# Patient Record
Sex: FEMALE | Race: WHITE | HISPANIC OR LATINO | Employment: UNEMPLOYED | URBAN - METROPOLITAN AREA
[De-identification: names, ages, dates, MRNs, and addresses within clinical notes are randomized per-mention and may not be internally consistent; named-entity substitution may affect disease eponyms.]

---

## 2018-09-18 ENCOUNTER — HOSPITAL ENCOUNTER (INPATIENT)
Facility: MEDICAL CENTER | Age: 33
LOS: 10 days | DRG: 100 | End: 2018-09-28
Attending: EMERGENCY MEDICINE | Admitting: INTERNAL MEDICINE
Payer: MEDICAID

## 2018-09-18 DIAGNOSIS — F41.9 ANXIETY DISORDER, UNSPECIFIED TYPE: ICD-10-CM

## 2018-09-18 DIAGNOSIS — R41.82 ALTERED MENTAL STATUS, UNSPECIFIED ALTERED MENTAL STATUS TYPE: ICD-10-CM

## 2018-09-18 DIAGNOSIS — R56.9 SEIZURE (HCC): ICD-10-CM

## 2018-09-18 DIAGNOSIS — R56.9 SEIZURES (HCC): ICD-10-CM

## 2018-09-18 PROBLEM — R31.9 URINARY TRACT INFECTION WITH HEMATURIA: Status: ACTIVE | Noted: 2018-09-18

## 2018-09-18 PROBLEM — G40.909 SEIZURE DISORDER (HCC): Status: ACTIVE | Noted: 2018-09-18

## 2018-09-18 PROBLEM — N39.0 URINARY TRACT INFECTION WITH HEMATURIA: Status: ACTIVE | Noted: 2018-09-18

## 2018-09-18 PROCEDURE — A9270 NON-COVERED ITEM OR SERVICE: HCPCS | Performed by: INTERNAL MEDICINE

## 2018-09-18 PROCEDURE — 95951 EEG: CPT | Mod: 52

## 2018-09-18 PROCEDURE — 700105 HCHG RX REV CODE 258: Performed by: INTERNAL MEDICINE

## 2018-09-18 PROCEDURE — 700105 HCHG RX REV CODE 258: Performed by: PSYCHIATRY & NEUROLOGY

## 2018-09-18 PROCEDURE — A9270 NON-COVERED ITEM OR SERVICE: HCPCS | Performed by: EMERGENCY MEDICINE

## 2018-09-18 PROCEDURE — 770006 HCHG ROOM/CARE - MED/SURG/GYN SEMI*

## 2018-09-18 PROCEDURE — 700102 HCHG RX REV CODE 250 W/ 637 OVERRIDE(OP): Performed by: EMERGENCY MEDICINE

## 2018-09-18 PROCEDURE — 700102 HCHG RX REV CODE 250 W/ 637 OVERRIDE(OP): Performed by: INTERNAL MEDICINE

## 2018-09-18 PROCEDURE — 700111 HCHG RX REV CODE 636 W/ 250 OVERRIDE (IP): Performed by: INTERNAL MEDICINE

## 2018-09-18 PROCEDURE — 700111 HCHG RX REV CODE 636 W/ 250 OVERRIDE (IP): Performed by: PSYCHIATRY & NEUROLOGY

## 2018-09-18 PROCEDURE — 99223 1ST HOSP IP/OBS HIGH 75: CPT | Performed by: INTERNAL MEDICINE

## 2018-09-18 PROCEDURE — 99285 EMERGENCY DEPT VISIT HI MDM: CPT

## 2018-09-18 RX ORDER — CARBAMAZEPINE 200 MG/1
200 TABLET ORAL 2 TIMES DAILY
Status: DISCONTINUED | OUTPATIENT
Start: 2018-09-18 | End: 2018-09-20

## 2018-09-18 RX ORDER — PROMETHAZINE HYDROCHLORIDE 25 MG/1
12.5-25 SUPPOSITORY RECTAL EVERY 4 HOURS PRN
Status: DISCONTINUED | OUTPATIENT
Start: 2018-09-18 | End: 2018-09-20

## 2018-09-18 RX ORDER — LORAZEPAM 2 MG/ML
4 INJECTION INTRAMUSCULAR
Status: DISCONTINUED | OUTPATIENT
Start: 2018-09-18 | End: 2018-09-28 | Stop reason: HOSPADM

## 2018-09-18 RX ORDER — CARBAMAZEPINE 200 MG/1
400 TABLET ORAL ONCE
Status: COMPLETED | OUTPATIENT
Start: 2018-09-18 | End: 2018-09-18

## 2018-09-18 RX ORDER — POLYETHYLENE GLYCOL 3350 17 G/17G
1 POWDER, FOR SOLUTION ORAL
Status: DISCONTINUED | OUTPATIENT
Start: 2018-09-18 | End: 2018-09-28 | Stop reason: HOSPADM

## 2018-09-18 RX ORDER — PROMETHAZINE HYDROCHLORIDE 25 MG/1
12.5-25 TABLET ORAL EVERY 4 HOURS PRN
Status: DISCONTINUED | OUTPATIENT
Start: 2018-09-18 | End: 2018-09-20

## 2018-09-18 RX ORDER — ACETAMINOPHEN 325 MG/1
650 TABLET ORAL EVERY 6 HOURS PRN
Status: DISCONTINUED | OUTPATIENT
Start: 2018-09-18 | End: 2018-09-28 | Stop reason: HOSPADM

## 2018-09-18 RX ORDER — LORAZEPAM 2 MG/ML
.5-1 INJECTION INTRAMUSCULAR EVERY 6 HOURS PRN
Status: DISCONTINUED | OUTPATIENT
Start: 2018-09-18 | End: 2018-09-28 | Stop reason: HOSPADM

## 2018-09-18 RX ORDER — SODIUM CHLORIDE 9 MG/ML
INJECTION, SOLUTION INTRAVENOUS CONTINUOUS
Status: DISCONTINUED | OUTPATIENT
Start: 2018-09-18 | End: 2018-09-19

## 2018-09-18 RX ORDER — LORAZEPAM 2 MG/ML
1 INJECTION INTRAMUSCULAR ONCE
Status: COMPLETED | OUTPATIENT
Start: 2018-09-18 | End: 2018-09-18

## 2018-09-18 RX ORDER — IBUPROFEN 200 MG
200 TABLET ORAL EVERY 6 HOURS PRN
Status: DISCONTINUED | OUTPATIENT
Start: 2018-09-18 | End: 2018-09-28 | Stop reason: HOSPADM

## 2018-09-18 RX ORDER — CARBAMAZEPINE 200 MG/1
200 TABLET ORAL 2 TIMES DAILY
Status: DISCONTINUED | OUTPATIENT
Start: 2018-09-18 | End: 2018-09-18

## 2018-09-18 RX ORDER — ONDANSETRON 4 MG/1
4 TABLET, ORALLY DISINTEGRATING ORAL EVERY 4 HOURS PRN
Status: DISCONTINUED | OUTPATIENT
Start: 2018-09-18 | End: 2018-09-28 | Stop reason: HOSPADM

## 2018-09-18 RX ORDER — AMOXICILLIN 250 MG
2 CAPSULE ORAL 2 TIMES DAILY
Status: DISCONTINUED | OUTPATIENT
Start: 2018-09-18 | End: 2018-09-28 | Stop reason: HOSPADM

## 2018-09-18 RX ORDER — ONDANSETRON 2 MG/ML
4 INJECTION INTRAMUSCULAR; INTRAVENOUS EVERY 4 HOURS PRN
Status: DISCONTINUED | OUTPATIENT
Start: 2018-09-18 | End: 2018-09-28 | Stop reason: HOSPADM

## 2018-09-18 RX ORDER — BISACODYL 10 MG
10 SUPPOSITORY, RECTAL RECTAL
Status: DISCONTINUED | OUTPATIENT
Start: 2018-09-18 | End: 2018-09-28 | Stop reason: HOSPADM

## 2018-09-18 RX ADMIN — SODIUM CHLORIDE: 9 INJECTION, SOLUTION INTRAVENOUS at 11:36

## 2018-09-18 RX ADMIN — CARBAMAZEPINE 400 MG: 200 TABLET ORAL at 11:36

## 2018-09-18 RX ADMIN — ENOXAPARIN SODIUM 40 MG: 40 INJECTION, SOLUTION INTRAVENOUS; SUBCUTANEOUS at 11:36

## 2018-09-18 RX ADMIN — CARBAMAZEPINE 200 MG: 200 TABLET ORAL at 18:24

## 2018-09-18 RX ADMIN — LORAZEPAM 1 MG: 2 INJECTION INTRAMUSCULAR; INTRAVENOUS at 20:06

## 2018-09-18 RX ADMIN — SODIUM CHLORIDE 3000 MG: 9 INJECTION, SOLUTION INTRAVENOUS at 20:26

## 2018-09-18 RX ADMIN — SODIUM CHLORIDE: 9 INJECTION, SOLUTION INTRAVENOUS at 20:26

## 2018-09-18 RX ADMIN — IBUPROFEN 200 MG: 200 TABLET, FILM COATED ORAL at 15:56

## 2018-09-18 ASSESSMENT — COGNITIVE AND FUNCTIONAL STATUS - GENERAL
PERSONAL GROOMING: A LITTLE
DRESSING REGULAR UPPER BODY CLOTHING: A LITTLE
EATING MEALS: A LITTLE
CLIMB 3 TO 5 STEPS WITH RAILING: A LITTLE
MOVING TO AND FROM BED TO CHAIR: A LITTLE
SUGGESTED CMS G CODE MODIFIER DAILY ACTIVITY: CK
HELP NEEDED FOR BATHING: A LITTLE
DAILY ACTIVITIY SCORE: 18
TOILETING: A LITTLE
DRESSING REGULAR LOWER BODY CLOTHING: A LITTLE
WALKING IN HOSPITAL ROOM: A LITTLE
MOVING FROM LYING ON BACK TO SITTING ON SIDE OF FLAT BED: A LITTLE
SUGGESTED CMS G CODE MODIFIER MOBILITY: CK
MOBILITY SCORE: 19
STANDING UP FROM CHAIR USING ARMS: A LITTLE

## 2018-09-18 ASSESSMENT — PATIENT HEALTH QUESTIONNAIRE - PHQ9
2. FEELING DOWN, DEPRESSED, IRRITABLE, OR HOPELESS: NOT AT ALL
SUM OF ALL RESPONSES TO PHQ9 QUESTIONS 1 AND 2: 0
1. LITTLE INTEREST OR PLEASURE IN DOING THINGS: NOT AT ALL

## 2018-09-18 ASSESSMENT — LIFESTYLE VARIABLES
EVER_SMOKED: NEVER
ALCOHOL_USE: NO

## 2018-09-18 ASSESSMENT — PAIN SCALES - GENERAL
PAINLEVEL_OUTOF10: 0
PAINLEVEL_OUTOF10: 4
PAINLEVEL_OUTOF10: 4
PAINLEVEL_OUTOF10: 0

## 2018-09-18 NOTE — H&P
Hospital Medicine History and Physical      Date of Service  2018    Chief Complaint  Chief Complaint   Patient presents with   • ALOC   • Seizure       History of Presenting Illness  Andrew Horton is a 33 y.o. female PMH of seizure disorder, who presents with multiple seiure episode. She is only Indonesian speaking. She is somewhat poor historian. She was transferred from outside facility for multiple seizure episodes. She said she takes her medication regularly, but didn't know her neurology. Per ERP from outside facility she had about 10- seizure episodes on Friday and fell and hit her right side of the head. CT head was negative. She also complaint about bilateral LE pain. In the ER, neurology was consulted.    Primary Care Physician  SHERRIE Thompson      Code Status  Full code    Review of Systems  Review of Systems   Unable to perform ROS: Mental acuity     Please see HPI, all other systems were reviewed and are negative (AMA/CMS criteria)     Past Medical History  Past Medical History:   Diagnosis Date   • Anemia 2013   • Epilepsy associated with specific stimuli (HCC)    • Head ache    • Seizure (HCC)        Surgical History  No past surgical history on file.    Medications  No current facility-administered medications on file prior to encounter.      Current Outpatient Prescriptions on File Prior to Encounter   Medication Sig Dispense Refill   • carBAMazepine (TEGRETOL) 200 MG Tab Take 1 Tab by mouth 2 Times a Day for 90 days. 180 Tab 0     Family History  Family History   Problem Relation Age of Onset   • Hypertension Father          Social History  Social History   Substance Use Topics   • Smoking status: Never Smoker   • Smokeless tobacco: Never Used   • Alcohol use No       Allergies  No Known Allergies     Physical Exam  Laboratory   Hemodynamics  No data recorded.      Pulse  Av  Min: 72  Max: 75 Heart Rate (Monitored): 84  NIBP: 126/79      Respiratory      Respiration: 16, Pulse  Oximetry: (!) 87 %             Physical Exam   Constitutional: No distress.   HENT:   Head: Normocephalic.   Mouth/Throat: Oropharynx is clear and moist.   Bruise over right side of the face   Eyes: Pupils are equal, round, and reactive to light. Conjunctivae and EOM are normal.   Neck: Normal range of motion. Neck supple. No tracheal deviation present. No thyromegaly present.   Cardiovascular: Normal rate and regular rhythm.    No murmur heard.  Pulmonary/Chest: Effort normal and breath sounds normal. No respiratory distress. She has no wheezes.   Abdominal: Soft. Bowel sounds are normal. She exhibits no distension. There is no tenderness.   Musculoskeletal: She exhibits no edema or tenderness.   Neurological: She is alert. No cranial nerve deficit.   Skin: Skin is warm and dry. She is not diaphoretic. No erythema.               No results for input(s): ALTSGPT, ASTSGOT, ALKPHOSPHAT, TBILIRUBIN, DBILIRUBIN, GAMMAGT, AMYLASE, LIPASE, ALB, PREALBUMIN, GLUCOSE in the last 72 hours.              Lab Results   Component Value Date    TROPONINI <0.04 05/11/2013       Imaging  No orders to display       Labs:   WBC 4.9, hemoglobin 14.4, platelet 147   Glucose 1:15, BUN 11, creatinine 0.61, sodium 141, potassium 3.5, chloride 106, CO2 26   Urine drug screen negative   Urinalysis   1+ leukocyte esterase , 15-20 WBC    Assessment/Plan     I anticipate this patient will require at least two midnights for appropriate medical management, necessitating inpatient admission.    Urinary tract infection with hematuria- (present on admission)   Assessment & Plan    + UA from outside facility  Due to confusion, will empirically start her on IV ceftriaxone        Seizure disorder (HCC)- (present on admission)   Assessment & Plan    Plan as above        Acute encephalopathy- (present on admission)   Assessment & Plan    She is on trileptal  On carbamazepine  Check carbamazepine level  On IV ativan for breakthrough seizure  Check urine  drugs  Aspiration, fall, seizure precaution  Neurology consulted            Prophylaxis:  lovenox

## 2018-09-18 NOTE — ASSESSMENT & PLAN NOTE
+ UA from outside facility and initially started on IV ceftriaxone.  Discussed with antimicrobial stewardship, no need for further antibiotics.  Subsequently ceftriaxone discontinued.     -Continues with no complaints of dysuria, frequency or urgency

## 2018-09-18 NOTE — ED TRIAGE NOTES
"Pt presents to ED via EMS from [facility] for a neuro consult. Pt is Mongolian speaking only. Pt able to walk from EMS stretcher to hospital stretcher. Pt was brought to their ED after having multiple seizures. pts  reported that pt had approximately \"10 seizure on Friday\" and has been having seizures weekly. Pt has hx of seizures and is on Trileptal. Reports of seizures lasting between 4-5 mins in length. EMS reports that pt hit head during one of those episodes. Pt has abrasion and contusions around right eye. EMS reports that pt also has a UTI. Pt was given Rocephin, ibuprofen and robaxin at previous facility. Pt is able to identify self and year. Pt unaware of situation. Pt cannot tell this RN the month. Pt is able to follow commands. During assessment pt states sensation is decreased in right arm. Pt denies any pain. Pt placed on cardiac monitor and continuous spO2 call light in reach. Will continue to monitor.   "

## 2018-09-18 NOTE — ED NOTES
Pt found confused, walking around unit. Redirected pt back to bed. Advised that it is unsafe to walk around with assistance.

## 2018-09-18 NOTE — ED PROVIDER NOTES
ED Provider Note    CHIEF COMPLAINT  Chief Complaint   Patient presents with   • ALOC   • Seizure       HPI  Jennifer Alexander is a 33 y.o. female who presents as a transfer from [place] for evaluation of seizures.  The patient is Albanian-speaking only and history is obtained using the [place] language line  and also primarily from the notes that were sent from [place].  Patient has a history of a seizure disorder.  It appears that she takes Trileptal.  She states she has been taking it regularly.  Reportedly she had 10 seizures on Friday.  The patient does not really recall when her last seizure was.  She states she feels absolutely fine currently.  She had a CT scan in [place] that was normal.  Her laboratory workup was unremarkable.  Urine showed leukocyte esterase and some white cells and she was thought to possibly have a UTI however in reviewing that it looks like there were a lot of epithelial cells and this likely was just a contaminated specimen.  The patient was transferred for possible MRI, EEG, and neurology consult.    REVIEW OF SYSTEMS  See HPI for further details. All other systems negative.    PAST MEDICAL HISTORY  Past Medical History:   Diagnosis Date   • Anemia 1/30/2013   • Epilepsy associated with specific stimuli (HCC)    • Head ache    • Seizure (HCC)        FAMILY HISTORY  Family History   Problem Relation Age of Onset   • Hypertension Father        SOCIAL HISTORY  Social History     Social History   • Marital status: Single     Spouse name: N/A   • Number of children: N/A   • Years of education: N/A     Social History Main Topics   • Smoking status: Never Smoker   • Smokeless tobacco: Never Used   • Alcohol use No   • Drug use: No   • Sexual activity: Yes     Partners: Male     Other Topics Concern   • Not on file     Social History Narrative    ** Merged History Encounter **            SURGICAL HISTORY  History reviewed. No pertinent surgical history.    CURRENT  "MEDICATIONS  Home Medications     Reviewed by Rosibel Rosenberg R.N. (Registered Nurse) on 09/18/18 at 0701  Med List Status: <None>   Medication Last Dose Status   carBAMazepine (TEGRETOL) 200 MG Tab  Active                ALLERGIES  No Known Allergies    PHYSICAL EXAM  VITAL SIGNS: Pulse 75   Resp 14   Ht 1.626 m (5' 4\")   Wt 52.2 kg (115 lb)   LMP  (LMP Unknown)   SpO2 98%   BMI 19.74 kg/m²   Constitutional: Well developed, Well nourished, No acute distress, Non-toxic appearance.   HENT: Normocephalic, subacute abrasions to the right periorbital region.  No malocclusion.  Eyes: PERRL, EOMI, Conjunctiva normal, No discharge.   Neck: Normal range of motion, Supple, No meningismus.  Cardiovascular: Normal heart rate, Normal rhythm, No murmurs, No rubs, No gallops.   Thorax & Lungs: Clear to auscultation without wheezes, rales, or rhonchi.   Abdomen: Soft and nontender.   Skin: Warm, Dry.  Musculoskeletal: Good range of motion in all major joints.    Neurologic: Awake and alert.  Moves all extremities spontaneously and symmetrically with no focal deficits.      COURSE & MEDICAL DECISION MAKING  Pertinent Labs & Imaging studies reviewed. (See chart for details)  Is a 33-year-old transferred from [facility] for evaluation of seizures.  Patient appears neurologically intact.  She does appear to be a bit confused still not knowing exactly when her last seizure was however she has no physical complaints at this time.  I reviewed the records from [facility].  At this point I will discuss the case with the hospitalist for admission and further evaluation.    FINAL IMPRESSION  1.  Multiple seizures  2.   3.         Electronically signed by: Laz Alegre, 9/18/2018 7:31 AM    "

## 2018-09-18 NOTE — ASSESSMENT & PLAN NOTE
Continues to be more lucid and able to hold a conversation and answer questions appropriately, however continues with depressed mood at times.   Carbamazepine level unremarkable at 8  Continue on Ativan PRN and Geodon  Unable to report to DMV as patient has no current 's license and admitted to not being a [place] citizen.

## 2018-09-18 NOTE — CONSULTS
"CC:  Confusion following seizure    Date of Admission: 9/18/2018    Today's Date: 09/18/18    Consulting Physician: No att. providers found      HPI:    Jennifer Alexander is a 33 y.o. female with a PMH of seizures. HPI was relayed by family members at bedside along with patient. Patient had seizures on Thursday and Friday, following which she has been confused and a little slow cognitively. Family relates that when patient had seizures,she fell to the ground injuring her face. The family states that she has suffered with these since child mcknight, she used to be followed by a Neurologist in [place] about 10 years ago. She has tried several different medications, due to side effects, and is currently in Carbamazepine 200mg BID. However, patient has not been taking her medication as prescribed, she skips doses due to side effects.       ROS:     Patient mentioned feeling \"fast\" all along her spinal cord.     Constitutional: No fevers or chills.  Eyes: No blurry vision or eye pain.  ENT: No dysphagia or hearing loss.  Respiratory: No cough or shortness of breath.  Cardiovascular: No chest pain or palpitations.  GI: No nausea, vomiting, or diarrhea.  : No urinary incontinence or dysuria.  Musculoskeletal: No joint swelling or arthralgias.  Skin: No skin rashes.  Neuro: See HPI.  Endocrine: No heat or cold intolerance. No polydipsia or polyuria.  Psych: No depression or anxiety.  Heme/Lymph: No easy bruising or swollen lymph nodes.  All other systems were reviewed and were negative.       Past Medical History:   Past Medical History:   Diagnosis Date   • Anemia 1/30/2013   • Epilepsy associated with specific stimuli (HCC)    • Head ache    • Seizure (HCC)        Past Surgical History: History reviewed. No pertinent surgical history.    Social History:   Social History     Social History   • Marital status: Single     Spouse name: N/A   • Number of children: N/A   • Years of education: N/A     Occupational History   • " Not on file.     Social History Main Topics   • Smoking status: Never Smoker   • Smokeless tobacco: Never Used   • Alcohol use No   • Drug use: No   • Sexual activity: Yes     Partners: Male     Other Topics Concern   • Not on file     Social History Narrative    ** Merged History Encounter **            Family History:   Family History   Problem Relation Age of Onset   • Hypertension Father        Allergies: No Known Allergies      Current Facility-Administered Medications:   •  senna-docusate (PERICOLACE or SENOKOT S) 8.6-50 MG per tablet 2 Tab, 2 Tab, Oral, BID **AND** polyethylene glycol/lytes (MIRALAX) PACKET 1 Packet, 1 Packet, Oral, QDAY PRN **AND** magnesium hydroxide (MILK OF MAGNESIA) suspension 30 mL, 30 mL, Oral, QDAY PRN **AND** bisacodyl (DULCOLAX) suppository 10 mg, 10 mg, Rectal, QDAY PRN, Jimmy Birch M.D.  •  NS infusion, , Intravenous, Continuous, Jimmy Birch M.D., Last Rate: 100 mL/hr at 09/18/18 1136  •  enoxaparin (LOVENOX) inj 40 mg, 40 mg, Subcutaneous, DAILY, Jimmy Birch M.D., 40 mg at 09/18/18 1136  •  acetaminophen (TYLENOL) tablet 650 mg, 650 mg, Oral, Q6HRS PRN, Jimmy Birch M.D.  •  ondansetron (ZOFRAN) syringe/vial injection 4 mg, 4 mg, Intravenous, Q4HRS PRN, Jimmy Birch M.D.  •  ondansetron (ZOFRAN ODT) dispertab 4 mg, 4 mg, Oral, Q4HRS PRN, Jimmy Birch M.D.  •  promethazine (PHENERGAN) tablet 12.5-25 mg, 12.5-25 mg, Oral, Q4HRS PRN, Jimmy Birch M.D.  •  promethazine (PHENERGAN) suppository 12.5-25 mg, 12.5-25 mg, Rectal, Q4HRS PRN, Jimmy Birch M.D.  •  prochlorperazine (COMPAZINE) injection 5-10 mg, 5-10 mg, Intravenous, Q4HRS PRN, Jimmy Birch M.D.  •  LORazepam (ATIVAN) injection 4 mg, 4 mg, Intravenous, Q10 MIN PRN, Jimmy Birch M.D.  •  [START ON 9/19/2018] cefTRIAXone (ROCEPHIN) 2 g in  mL IVPB, 2 g, Intravenous, Q24HRS, Jimmy Birch M.D.  •  carBAMazepine (TEGRETOL) tablet 200 mg, 200 mg, Oral, BID, Jimmy Birch M.D.      PHYSICAL EXAM    Vitals:    09/18/18 0800 09/18/18 0815  09/18/18 0930 09/18/18 1055   BP:    136/75   Pulse: 72 72 74 76   Resp: 16 16 20 16   Temp:    36.8 °C (98.2 °F)   SpO2: 98% (!) 87% 100% 98%   Weight:       Height:         PE limited due to patient being set up for EEG    Head/Neck: NCAT. no meningismus neg kernig neg brudzinski. No obvious mass or heard bruit. No tender arteries or lost pulses. No rash of head or neck.    Skin: Warm, dry, intact. No rashes observed head/neck or body    Eyes/Funduscopic: no papilledema/pallor.    Mental Status: Awake, alert, oriented x 2. Name/repeat/fluent/command follow  intact. Cognitive delay and memory recovery slow.     Cranial Nerves: CN II-XII intact. PERRLA.   No afferent pupillary defect. EOMI. No nystagmus.       Motor:  intact, no abn mvmts.  bulk & tone wnl.      Sensory: symmetric to sharp     Coordination: dysmetria absent    DTR's: intact/sym. no clonus. Toes mute.    Gait/Station: n/a fall concern      Labs:                          No results for input(s): SODIUM, POTASSIUM, CHLORIDE, CO2, GLUCOSE, BUN, CPKTOTAL in the last 72 hours.  No results for input(s): SODIUM, POTASSIUM, CHLORIDE, CO2, BUN, CREATININE, MAGNESIUM, PHOSPHORUS, CALCIUM in the last 72 hours.      No results found for this or any previous visit.           Imaging: neuroimaging reviewed and directly visualized by me  No orders to display       Assessment/Plan:    Epilepsy, due to medication non compliance  Patient on Carbamazepine 200mg BID, skipping doses due to side effects.   Plan  -EEG  -Ativan for seizures  -Restart carbamazepine  -Consider starting patient on Keprra      Hang Burroughs M.D.  [facility] Resident PGY-2

## 2018-09-18 NOTE — ASSESSMENT & PLAN NOTE
No seizure activity noted over the last 48 hours.    Continue Keppra per neurology and Carbamazepine being tapered off  Keppra at current dosing is $116/month at [facility] for cash price

## 2018-09-19 PROBLEM — R94.31 ABNORMAL EKG: Status: ACTIVE | Noted: 2018-09-19

## 2018-09-19 PROBLEM — R45.851 SUICIDAL IDEATIONS: Status: ACTIVE | Noted: 2018-09-19

## 2018-09-19 LAB
ALBUMIN SERPL BCP-MCNC: 3.6 G/DL (ref 3.2–4.9)
ALBUMIN/GLOB SERPL: 1.6 G/DL
ALP SERPL-CCNC: 61 U/L (ref 30–99)
ALT SERPL-CCNC: 10 U/L (ref 2–50)
ANION GAP SERPL CALC-SCNC: 8 MMOL/L (ref 0–11.9)
AST SERPL-CCNC: 12 U/L (ref 12–45)
BILIRUB SERPL-MCNC: 0.4 MG/DL (ref 0.1–1.5)
BUN SERPL-MCNC: 5 MG/DL (ref 8–22)
CALCIUM SERPL-MCNC: 7.8 MG/DL (ref 8.5–10.5)
CARBAMAZEPINE SERPL-MCNC: 8 UG/ML (ref 4–12)
CHLORIDE SERPL-SCNC: 109 MMOL/L (ref 96–112)
CO2 SERPL-SCNC: 24 MMOL/L (ref 20–33)
CREAT SERPL-MCNC: 0.57 MG/DL (ref 0.5–1.4)
ERYTHROCYTE [DISTWIDTH] IN BLOOD BY AUTOMATED COUNT: 41.1 FL (ref 35.9–50)
GLOBULIN SER CALC-MCNC: 2.2 G/DL (ref 1.9–3.5)
GLUCOSE SERPL-MCNC: 94 MG/DL (ref 65–99)
HCT VFR BLD AUTO: 40.8 % (ref 37–47)
HGB BLD-MCNC: 13.6 G/DL (ref 12–16)
MCH RBC QN AUTO: 29.8 PG (ref 27–33)
MCHC RBC AUTO-ENTMCNC: 33.3 G/DL (ref 33.6–35)
MCV RBC AUTO: 89.3 FL (ref 81.4–97.8)
PLATELET # BLD AUTO: 120 K/UL (ref 164–446)
PMV BLD AUTO: 9.7 FL (ref 9–12.9)
POTASSIUM SERPL-SCNC: 3.8 MMOL/L (ref 3.6–5.5)
PROT SERPL-MCNC: 5.8 G/DL (ref 6–8.2)
RBC # BLD AUTO: 4.57 M/UL (ref 4.2–5.4)
SODIUM SERPL-SCNC: 141 MMOL/L (ref 135–145)
TROPONIN I SERPL-MCNC: <0.01 NG/ML (ref 0–0.04)
WBC # BLD AUTO: 6.7 K/UL (ref 4.8–10.8)

## 2018-09-19 PROCEDURE — 80053 COMPREHEN METABOLIC PANEL: CPT

## 2018-09-19 PROCEDURE — 700111 HCHG RX REV CODE 636 W/ 250 OVERRIDE (IP): Performed by: INTERNAL MEDICINE

## 2018-09-19 PROCEDURE — 700105 HCHG RX REV CODE 258: Performed by: PSYCHIATRY & NEUROLOGY

## 2018-09-19 PROCEDURE — 700105 HCHG RX REV CODE 258: Performed by: INTERNAL MEDICINE

## 2018-09-19 PROCEDURE — 84484 ASSAY OF TROPONIN QUANT: CPT

## 2018-09-19 PROCEDURE — 85027 COMPLETE CBC AUTOMATED: CPT

## 2018-09-19 PROCEDURE — 99233 SBSQ HOSP IP/OBS HIGH 50: CPT | Performed by: HOSPITALIST

## 2018-09-19 PROCEDURE — 93010 ELECTROCARDIOGRAM REPORT: CPT | Performed by: INTERNAL MEDICINE

## 2018-09-19 PROCEDURE — 770006 HCHG ROOM/CARE - MED/SURG/GYN SEMI*

## 2018-09-19 PROCEDURE — A9270 NON-COVERED ITEM OR SERVICE: HCPCS | Performed by: INTERNAL MEDICINE

## 2018-09-19 PROCEDURE — 80156 ASSAY CARBAMAZEPINE TOTAL: CPT

## 2018-09-19 PROCEDURE — 700102 HCHG RX REV CODE 250 W/ 637 OVERRIDE(OP): Performed by: INTERNAL MEDICINE

## 2018-09-19 PROCEDURE — 700111 HCHG RX REV CODE 636 W/ 250 OVERRIDE (IP): Performed by: PSYCHIATRY & NEUROLOGY

## 2018-09-19 PROCEDURE — 36415 COLL VENOUS BLD VENIPUNCTURE: CPT

## 2018-09-19 PROCEDURE — 93005 ELECTROCARDIOGRAM TRACING: CPT | Performed by: NURSE PRACTITIONER

## 2018-09-19 RX ADMIN — STANDARDIZED SENNA CONCENTRATE AND DOCUSATE SODIUM 2 TABLET: 8.6; 5 TABLET ORAL at 05:26

## 2018-09-19 RX ADMIN — ENOXAPARIN SODIUM 40 MG: 40 INJECTION, SOLUTION INTRAVENOUS; SUBCUTANEOUS at 05:26

## 2018-09-19 RX ADMIN — CARBAMAZEPINE 200 MG: 200 TABLET ORAL at 05:26

## 2018-09-19 RX ADMIN — SODIUM CHLORIDE 750 MG: 9 INJECTION, SOLUTION INTRAVENOUS at 05:05

## 2018-09-19 RX ADMIN — SODIUM CHLORIDE: 9 INJECTION, SOLUTION INTRAVENOUS at 15:27

## 2018-09-19 RX ADMIN — CARBAMAZEPINE 200 MG: 200 TABLET ORAL at 18:44

## 2018-09-19 RX ADMIN — SODIUM CHLORIDE 750 MG: 9 INJECTION, SOLUTION INTRAVENOUS at 18:43

## 2018-09-19 RX ADMIN — SODIUM CHLORIDE: 9 INJECTION, SOLUTION INTRAVENOUS at 05:21

## 2018-09-19 RX ADMIN — STANDARDIZED SENNA CONCENTRATE AND DOCUSATE SODIUM 2 TABLET: 8.6; 5 TABLET ORAL at 18:44

## 2018-09-19 RX ADMIN — CEFTRIAXONE SODIUM 2 G: 2 INJECTION, POWDER, FOR SOLUTION INTRAMUSCULAR; INTRAVENOUS at 05:21

## 2018-09-19 ASSESSMENT — PAIN SCALES - GENERAL
PAINLEVEL_OUTOF10: 0

## 2018-09-19 NOTE — EEG PROGRESS NOTE
EEG 09/18/18 6:33 PM    ROUTINE VIDEO ELECTROENCEPHALOGRAM REPORT      NAME: Sana-Martinez    REFERRING Dr: DR. ALBARADO    DURATION: 26 minutes    INDICATION: Seizure      TECHNIQUE: 30 channel routine electroencephalogram (EEG) was performed in accordance with the international 10-20 system. The study was reviewed in bipolar and referential montages. The recording examined the patient during wakeful and drowsy state(s).     DESCRIPTION OF THE RECORD:      Background rhythm during awake stage shows well-organized, well-developed, average voltage 10 to 11 hertz alpha activity in the posterior regions.  It blocks with eye opening and it is bilaterally synchronous and symmetrical.  No spike-and-wave discharges or any lateralizing abnormalities are seen.  Photic stimulation did not produce any abnormalities. No abnormalities were found during the procedure. Stage I sleep was achieved.      ACTIVATION PROCEDURES:      Photic Stimulation were done    ICTAL AND/OR INTERICTAL FINDINGS:    No focal or generalized epileptiform activity noted. No regional slowing was seen during this routine study.  No clinical events or seizures were reported or recorded during the study.      EKG: sampling of the EKG recording demonstrated sinus rhythm.        INTERPRETATION:      ________________________________________________________________________    This is normal routine video EEG recording in the awake and drowsy/sleep state(s).    This scalp video EEG remains not remarkable    Of note, unremarkable EEG does not completely exclude the diagnosis  of seizures since seizure is an episodic phenomena and frontal lobe seizures could have normal scalp EEG. Clinical correlation may help     If clinical suspicion of seizure remains high.  Prolonged outpatient EEG   monitoring may be of help.    EEG 09/18/18 6:53 PM  ________________________________________________________________________

## 2018-09-19 NOTE — PROGRESS NOTES
Dr. Badillo and Unique Ronquillo, NP at bedside assessing pt.  in use. Pt expressed suicidal ideations, SAD score of 3. Legal hold paperwork given to providers. Providers to place appropriate orders. 1:1 precautions in place.

## 2018-09-19 NOTE — PROGRESS NOTES
Patient admitted to neuroscience floor at this time from ER. A&ox1, to name. Complains of menstrual pain and MD aware. 3 abrasions to right eye. Follows simple commands, Swedish speaking only. Safety precautions maintained.

## 2018-09-19 NOTE — PROGRESS NOTES
"Assumed pt care at 1900. Pt alert drowsy, flat affect, confused. Bed alarm on. Fall precautions in place. No distress. Iv placed, pt removed when trying to get bed to use restroom. Second iv placed. Normal saline running at 100ml/hr. continuous pulse ox. Intact.  VSS Blood pressure 111/67, pulse 61, temperature 36.8 °C (98.3 °F), resp. rate 16, height 1.626 m (5' 4\"), weight 48.3 kg (106 lb 7.7 oz), last menstrual period 09/18/2018, SpO2 97 %, not currently breastfeeding.    Will monitor and continue plan of care.   "

## 2018-09-19 NOTE — PROGRESS NOTES
After ativan Pt slept through out night, drowsy when awake. Answers questions,. VSS, will monitor and continue plan of care.

## 2018-09-19 NOTE — PROGRESS NOTES
2 RN skin check with Ney MCKEON. Patient has three abrasions/scabs near right eye. Otherwise skin intact.

## 2018-09-19 NOTE — DIETARY
Nutrition Services: Day 1 of admit.  Jennifer Alexander is a 33 y.o. female with admitting DX of Seizure   Consult received for Low BMI, Poor PO & Wt Loss on Nutrition Admit Screen     Assessment:  Ht: 162.6 cm, Wt 9/18: 48.3 kg via bed scale, BMI: 18.27  Diet/Intake: Regular - Per chart pt PO % 1 meal documented.      Evaluation:   1. Attempted to visit pt, pt was busy with other discipline.   2. Per chart review pt's wt on 7/11 - 46.7 kg, wt has increased.   3. Meds: tegretol, rocephin, lovenox, keppra, pericolace  4. NS infusion @ 100 ml/hr  5. Last BM: 9/17    Recommendations/Plan:  1. Encourage intake of meals  2. Document intake of all meals as % taken in ADL's to provide interdisciplinary communication across all shifts.   3. Monitor weight.  4. Nutrition rep will continue to see patient for ongoing meal and snack preferences.  5. Obtain supplement order per RD as needed.    RD following

## 2018-09-19 NOTE — PROCEDURES
DATE OF SERVICE:  09/18/2018    This is an inpatient EEG that was done on 09/18/2018.    ROUTINE VIDEO ELECTROENCEPHALOGRAM REPORT        NAME: Jennifer Alexander     REFERRING Dr: DR. ALBARADO     DURATION: 26 minutes     INDICATION: Seizure        TECHNIQUE: 30 channel routine electroencephalogram (EEG) was performed in accordance with the international 10-20 system. The study was reviewed in bipolar and referential montages. The recording examined the patient during wakeful and drowsy state(s).      DESCRIPTION OF THE RECORD:        Background rhythm during awake stage shows well-organized, well-developed, average voltage 10 to 11 hertz alpha activity in the posterior regions.  It blocks with eye opening and it is bilaterally synchronous and symmetrical.  No spike-and-wave discharges or any lateralizing abnormalities are seen.  Photic stimulation did not produce any abnormalities. No abnormalities were found during the procedure. Stage I sleep was achieved.        ACTIVATION PROCEDURES:       Photic Stimulation were done     ICTAL AND/OR INTERICTAL FINDINGS:    No focal or generalized epileptiform activity noted. No regional slowing was seen during this routine study.  No clinical events or seizures were reported or recorded during the study.       EKG: sampling of the EKG recording demonstrated sinus rhythm.          INTERPRETATION:        ________________________________________________________________________     This is normal routine video EEG recording in the awake and drowsy/sleep state(s).     This scalp video EEG remains not remarkable     Of note, unremarkable EEG does not completely exclude the diagnosis  of seizures since seizure is an episodic phenomena and frontal lobe seizures could have normal scalp EEG. Clinical correlation may help     If clinical suspicion of seizure remains high.  Prolonged outpatient EEG   monitoring may be of help.     EEG 09/18/18 6:53  PM  ________________________________________________________________________                     ____________________________________     MD HIRAM ARELLANO    DD:  09/18/2018 18:33:00  DT:  09/18/2018 18:43:17    D#:  [id number]  Job#:  [id number]

## 2018-09-19 NOTE — CARE PLAN
Problem: Safety  Goal: Will remain free from injury  Outcome: PROGRESSING AS EXPECTED  No falls this shift. Pt in room near nurse's station. Bed/chair alarm utilized. Call bell in reach, bed in low position, room free of clutter. Pt educated to ring for assistance.    Problem: Pain Management  Goal: Pain level will decrease to patient's comfort goal  Outcome: PROGRESSING AS EXPECTED  Pt educated on pain medication available. No complaints of pain.    Problem: Skin Integrity  Goal: Risk for impaired skin integrity will decrease  Outcome: PROGRESSING AS EXPECTED  Pt able to turn and reposition self. Ambulating to bathroom.

## 2018-09-19 NOTE — PROCEDURES
DATE OF SERVICE:  09/18/2018    This in an inpatient EEG that was done on 09/18/2018.    ROUTINE VIDEO ELECTROENCEPHALOGRAM REPORT        NAME: Jennifer Alexander     REFERRING Dr: DR. ALBARADO     DURATION: 26 minutes     INDICATION: Seizure        TECHNIQUE: 30 channel routine electroencephalogram (EEG) was performed in accordance with the international 10-20 system. The study was reviewed in bipolar and referential montages. The recording examined the patient during wakeful and drowsy state(s).      DESCRIPTION OF THE RECORD:        Background rhythm during awake stage shows well-organized, well-developed, average voltage 10 to 11 hertz alpha activity in the posterior regions.  It blocks with eye opening and it is bilaterally synchronous and symmetrical.  No spike-and-wave discharges or any lateralizing abnormalities are seen.  Photic stimulation did not produce any abnormalities. No abnormalities were found during the procedure. Stage I sleep was achieved.        ACTIVATION PROCEDURES:       Photic Stimulation were done     ICTAL AND/OR INTERICTAL FINDINGS:    No focal or generalized epileptiform activity noted. No regional slowing was seen during this routine study.  No clinical events or seizures were reported or recorded during the study.       EKG: sampling of the EKG recording demonstrated sinus rhythm.          INTERPRETATION:        ________________________________________________________________________     This is normal routine video EEG recording in the awake and drowsy/sleep state(s).     This scalp video EEG remains not remarkable     Of note, unremarkable EEG does not completely exclude the diagnosis  of seizures since seizure is an episodic phenomena and frontal lobe seizures could have normal scalp EEG. Clinical correlation may help     If clinical suspicion of seizure remains high.  Prolonged outpatient EEG   monitoring may be of help.     EEG 09/18/18 6:53  PM  ________________________________________________________________________                     ____________________________________     MD HIRAM ARELLANO    DD:  09/18/2018 18:50:14  DT:  09/18/2018 19:20:24    D#:  [id number]  Job#:  [id number]

## 2018-09-19 NOTE — DISCHARGE PLANNING
Anticipated Discharge Disposition: TBD    Action: LSW faxed legal hold paperwork to [employer].    Barriers to Discharge: Medical Clearance    Plan: LSW to continue to assist with d/c as needed.

## 2018-09-19 NOTE — CARE PLAN
Problem: Safety  Goal: Will remain free from injury  Outcome: PROGRESSING AS EXPECTED  Seizure precautions in place, fall precautions in place. Pt educate to not get up without assistance and to use call light to call for help.  Goal: Will remain free from falls  Outcome: PROGRESSING AS EXPECTED

## 2018-09-20 PROBLEM — D75.839 THROMBOCYTHEMIA: Status: ACTIVE | Noted: 2018-09-20

## 2018-09-20 LAB
ANION GAP SERPL CALC-SCNC: 9 MMOL/L (ref 0–11.9)
BASOPHILS # BLD AUTO: 0.2 % (ref 0–1.8)
BASOPHILS # BLD: 0.01 K/UL (ref 0–0.12)
BUN SERPL-MCNC: 13 MG/DL (ref 8–22)
CALCIUM SERPL-MCNC: 8.5 MG/DL (ref 8.5–10.5)
CHLORIDE SERPL-SCNC: 106 MMOL/L (ref 96–112)
CO2 SERPL-SCNC: 26 MMOL/L (ref 20–33)
CREAT SERPL-MCNC: 0.65 MG/DL (ref 0.5–1.4)
EKG IMPRESSION: NORMAL
EOSINOPHIL # BLD AUTO: 0 K/UL (ref 0–0.51)
EOSINOPHIL NFR BLD: 0 % (ref 0–6.9)
ERYTHROCYTE [DISTWIDTH] IN BLOOD BY AUTOMATED COUNT: 40.5 FL (ref 35.9–50)
FOLATE SERPL-MCNC: 10.6 NG/ML
GLUCOSE SERPL-MCNC: 101 MG/DL (ref 65–99)
HCG SERPL QL: NEGATIVE
HCT VFR BLD AUTO: 38.4 % (ref 37–47)
HGB BLD-MCNC: 12.9 G/DL (ref 12–16)
IMM GRANULOCYTES # BLD AUTO: 0.06 K/UL (ref 0–0.11)
IMM GRANULOCYTES NFR BLD AUTO: 1.2 % (ref 0–0.9)
LYMPHOCYTES # BLD AUTO: 1.33 K/UL (ref 1–4.8)
LYMPHOCYTES NFR BLD: 27.1 % (ref 22–41)
MCH RBC QN AUTO: 29.8 PG (ref 27–33)
MCHC RBC AUTO-ENTMCNC: 33.6 G/DL (ref 33.6–35)
MCV RBC AUTO: 88.7 FL (ref 81.4–97.8)
MONOCYTES # BLD AUTO: 0.28 K/UL (ref 0–0.85)
MONOCYTES NFR BLD AUTO: 5.7 % (ref 0–13.4)
NEUTROPHILS # BLD AUTO: 3.22 K/UL (ref 2–7.15)
NEUTROPHILS NFR BLD: 65.8 % (ref 44–72)
NRBC # BLD AUTO: 0 K/UL
NRBC BLD-RTO: 0 /100 WBC
PLATELET # BLD AUTO: 149 K/UL (ref 164–446)
PMV BLD AUTO: 10.2 FL (ref 9–12.9)
POTASSIUM SERPL-SCNC: 3.7 MMOL/L (ref 3.6–5.5)
RBC # BLD AUTO: 4.33 M/UL (ref 4.2–5.4)
SODIUM SERPL-SCNC: 141 MMOL/L (ref 135–145)
VIT B12 SERPL-MCNC: 658 PG/ML (ref 211–911)
WBC # BLD AUTO: 4.9 K/UL (ref 4.8–10.8)

## 2018-09-20 PROCEDURE — 700111 HCHG RX REV CODE 636 W/ 250 OVERRIDE (IP): Performed by: INTERNAL MEDICINE

## 2018-09-20 PROCEDURE — 770006 HCHG ROOM/CARE - MED/SURG/GYN SEMI*

## 2018-09-20 PROCEDURE — 80048 BASIC METABOLIC PNL TOTAL CA: CPT

## 2018-09-20 PROCEDURE — 82746 ASSAY OF FOLIC ACID SERUM: CPT

## 2018-09-20 PROCEDURE — 700111 HCHG RX REV CODE 636 W/ 250 OVERRIDE (IP): Performed by: PSYCHIATRY & NEUROLOGY

## 2018-09-20 PROCEDURE — A9270 NON-COVERED ITEM OR SERVICE: HCPCS | Performed by: PSYCHIATRY & NEUROLOGY

## 2018-09-20 PROCEDURE — 82607 VITAMIN B-12: CPT

## 2018-09-20 PROCEDURE — 700105 HCHG RX REV CODE 258: Performed by: PSYCHIATRY & NEUROLOGY

## 2018-09-20 PROCEDURE — 700102 HCHG RX REV CODE 250 W/ 637 OVERRIDE(OP): Performed by: INTERNAL MEDICINE

## 2018-09-20 PROCEDURE — 36415 COLL VENOUS BLD VENIPUNCTURE: CPT

## 2018-09-20 PROCEDURE — 85025 COMPLETE CBC W/AUTO DIFF WBC: CPT

## 2018-09-20 PROCEDURE — 99232 SBSQ HOSP IP/OBS MODERATE 35: CPT | Performed by: HOSPITALIST

## 2018-09-20 PROCEDURE — 700102 HCHG RX REV CODE 250 W/ 637 OVERRIDE(OP): Performed by: PSYCHIATRY & NEUROLOGY

## 2018-09-20 PROCEDURE — A9270 NON-COVERED ITEM OR SERVICE: HCPCS | Performed by: INTERNAL MEDICINE

## 2018-09-20 PROCEDURE — 84703 CHORIONIC GONADOTROPIN ASSAY: CPT

## 2018-09-20 RX ORDER — ZIPRASIDONE HYDROCHLORIDE 20 MG/1
20 CAPSULE ORAL 2 TIMES DAILY
Status: DISCONTINUED | OUTPATIENT
Start: 2018-09-20 | End: 2018-09-24

## 2018-09-20 RX ORDER — LEVETIRACETAM 250 MG/1
750 TABLET ORAL 2 TIMES DAILY
Status: DISCONTINUED | OUTPATIENT
Start: 2018-09-20 | End: 2018-09-28 | Stop reason: HOSPADM

## 2018-09-20 RX ADMIN — ACETAMINOPHEN 650 MG: 325 TABLET, FILM COATED ORAL at 10:16

## 2018-09-20 RX ADMIN — SODIUM CHLORIDE 750 MG: 9 INJECTION, SOLUTION INTRAVENOUS at 05:34

## 2018-09-20 RX ADMIN — ZIPRASIDONE HCL 20 MG: 20 CAPSULE ORAL at 18:16

## 2018-09-20 RX ADMIN — CARBAMAZEPINE 200 MG: 200 TABLET ORAL at 18:16

## 2018-09-20 RX ADMIN — CARBAMAZEPINE 200 MG: 200 TABLET ORAL at 05:35

## 2018-09-20 RX ADMIN — ZIPRASIDONE HCL 20 MG: 20 CAPSULE ORAL at 11:06

## 2018-09-20 RX ADMIN — SODIUM CHLORIDE 750 MG: 9 INJECTION, SOLUTION INTRAVENOUS at 18:16

## 2018-09-20 RX ADMIN — ENOXAPARIN SODIUM 40 MG: 40 INJECTION, SOLUTION INTRAVENOUS; SUBCUTANEOUS at 05:34

## 2018-09-20 RX ADMIN — STANDARDIZED SENNA CONCENTRATE AND DOCUSATE SODIUM 2 TABLET: 8.6; 5 TABLET ORAL at 05:34

## 2018-09-20 ASSESSMENT — PAIN SCALES - GENERAL
PAINLEVEL_OUTOF10: 0
PAINLEVEL_OUTOF10: 0
PAINLEVEL_OUTOF10: 4
PAINLEVEL_OUTOF10: 0

## 2018-09-20 NOTE — ASSESSMENT & PLAN NOTE
Patient with no current chest pain or any complaints of cardiac issues    -EKG with upsloping ST segments on EKG which were read by the machine as acute MI  -Patient has no symptoms of acute MI, I read the EKG as artifact and early repolarization  -Troponins negative  -Will monitor

## 2018-09-20 NOTE — CARE PLAN
Problem: Safety  Goal: Will remain free from injury  Outcome: PROGRESSING AS EXPECTED  Fall precautions in place. q15 minute checks. Pt calm .   Goal: Will remain free from falls  Outcome: PROGRESSING AS EXPECTED  Fall precautions in place.

## 2018-09-20 NOTE — PROGRESS NOTES
"Patient resting in bed, drowsy but easily stimulated and becomes tearful, disoriented to time, very tearful regarding her situation stating \"I just want to heal\", c/o neck pain, tylenol for pain control, q15 min observation in place, legal hold active, denies any further need at this time, bed low and locked, bed alarm on.  "

## 2018-09-20 NOTE — ASSESSMENT & PLAN NOTE
Patient stable.  Denies SI or HI via . Wants to get home to her children.  Currently on legal hold  Psych following - adjusting medications

## 2018-09-20 NOTE — PROGRESS NOTES
Pt resting in bed. Tearful. Pt states she is having a lot of stress at home about money.pt also thinks she is in [place] and it is 2014. No distress. Pt calm. Reassurance provided. Will monitor and continue plan of care.

## 2018-09-20 NOTE — PROGRESS NOTES
Pt placed on legal hold and Q15 min observation due to suicidal ideations. All belongings and objects removed from room. Awaiting psych consult. No seizure activity noted, seizure precautions in place. Tolerating diet. Pt ambulating to bathroom with SBA. Pleasant and cooperative with care.

## 2018-09-20 NOTE — PSYCHIATRY
"PSYCHIATRIC CONSULTATION:  Reason for admission: Seizure (HCC)  Reason for consult:suicidal   Requesting Physician: Flip Badillo M.D.  Supervising Physician:Unique Carranza MD         Legal status:  extended    Chief Complaint: \"I'm tired of this.\"    HPI: 32yo female with history of seizure disorder presented to hospital after experiencing multiple (reportedly 10) seizures prior to admission. Pt also fell and struck the right side of her head during this episode. CT head negative. Pt was reportedly disoriented and agitated following this episode. While in the hospital, pt expressed suicidal ideation to a  and was placed on a legal hold.     Pt was seen today with the assistance of video , as pt only speaks German. Pt denied suicidal ideation, plan or intent, but was very tearful throughout the interview - reporting thoughts of hopelessness about her seizure disorder and excessive feelings of guilt and worthlessness, in regards to not being able to provide for her children. Pt denies any prior psychiatric history and appears to still be somewhat disoriented and in a post-ictal state.     Pt was seen by neurology who recommended d/c tegretol, starting keppra and adding PRN ativan and geodon.    Psychiatric Review of Systems:current symptoms as reported by pt.  Depression:endorsed depressed mood, but denied changes in weight, insomnia, psychomotor agitation/retardation, decreased energy.        [place]: Denies  Anxiety/Panic Attacks: Reports feeling anxious about when and where her next seizure will occur.  PTSD symptom: denies trauma, nightmares, flashbacks, hypervigilance, or avoidance behaviors.   Psychosis: denies AH, VH, paranoia, or delusions      Medical Review of Systems: as reported by pt. All systems reviewed. Only those found to be + are noted below. All others are negative.   Neurological:    TBIs: Denies   SZs: Chronic seizure disorder    Strokes: Denies     Other medical " "symptoms:     Thyroid: Denies   Diabetes: Denies   Cardiovascular disease: Denies     Psychiatric Examination: observed phenomenon:  Vitals: /72   Pulse 70   Temp 36.5 °C (97.7 °F)   Resp 16   Ht 1.626 m (5' 4.02\")   Wt 48.3 kg (106 lb 7.7 oz)   LMP 09/18/2018 (Exact Date)   SpO2 100%   Breastfeeding? No   BMI 18.27 kg/m²   Musculoskeletal: no psychomotor agitation or retardation; no tremors  Appearance: thin  female, appears stated age, good hygiene and grooming, wearing hospital attire, healing lacerations to right side of face.  Behavior: lying in bed, tearful during much of the interview  Thought Process: tangential, goal directed  Abnormal Thoughts/Psychosis: no evidence of AH, VH, paranoia, and/or delusions  Associations: no loose associations  Speech: spontaneous, regular rate, rhythm, tone, and volume; no stuttering or slurring of words  Language: fluent in English  Mood/Affect:\"Not good\"; affect is congruent to stated mood, appropriate to content, appears depressed  SI/HI: Denies SI and HI  Attention: intact  Memory: no gross impairment in immediate, recent, or remote memory  Orientation: Alert and oriented to person and place, somewhat disoriented, thinks it is 2016.  Fund of Knowledge: adequate  Insight and Judgment: poor/poor     Past Psychiatric Hx:   Diagnoses: None  Suicide attempts: None  Legal issues: None    Family Psychiatric Hx:  Father: HTN    Social Hx:  Lives with  and 2 children  Unable to work because of seizure disorder    Drug/Alcohol/Tobacco Hx:   Drugs: Denies     Alcohol:Denies   Tobacco: Denies    Medical Hx: labs, MARS, medications, etc were reviewed. Only those findings of potential interest to psychiatry are noted below:  Medical Conditions:   Past Medical History:   Diagnosis Date   • Anemia 1/30/2013   • Epilepsy associated with specific stimuli (HCC)    • Head ache    • Seizure (HCC)      Allergies:   No Known Allergies  Medications (currently " prescribed at [facility]):    Current Facility-Administered Medications:   •  ziprasidone (GEODON) capsule 20 mg, 20 mg, Oral, BID, Unique Coleman M.D., 20 mg at 09/20/18 1106  •  senna-docusate (PERICOLACE or SENOKOT S) 8.6-50 MG per tablet 2 Tab, 2 Tab, Oral, BID, 2 Tab at 09/20/18 0534 **AND** polyethylene glycol/lytes (MIRALAX) PACKET 1 Packet, 1 Packet, Oral, QDAY PRN **AND** magnesium hydroxide (MILK OF MAGNESIA) suspension 30 mL, 30 mL, Oral, QDAY PRN **AND** bisacodyl (DULCOLAX) suppository 10 mg, 10 mg, Rectal, QDAY PRN, Jimmy Birch M.D.  •  enoxaparin (LOVENOX) inj 40 mg, 40 mg, Subcutaneous, DAILY, Jimmy Birch M.D., 40 mg at 09/20/18 0534  •  acetaminophen (TYLENOL) tablet 650 mg, 650 mg, Oral, Q6HRS PRN, Jimmy Birch M.D., 650 mg at 09/20/18 1016  •  ondansetron (ZOFRAN) syringe/vial injection 4 mg, 4 mg, Intravenous, Q4HRS PRN, Jimmy Birch M.D.  •  ondansetron (ZOFRAN ODT) dispertab 4 mg, 4 mg, Oral, Q4HRS PRN, Jimmy Birch M.D.  •  promethazine (PHENERGAN) tablet 12.5-25 mg, 12.5-25 mg, Oral, Q4HRS PRN, Jimmy Birch M.D.  •  promethazine (PHENERGAN) suppository 12.5-25 mg, 12.5-25 mg, Rectal, Q4HRS PRN, Jimmy Birch M.D.  •  prochlorperazine (COMPAZINE) injection 5-10 mg, 5-10 mg, Intravenous, Q4HRS PRN, Jimmy Birch M.D.  •  LORazepam (ATIVAN) injection 4 mg, 4 mg, Intravenous, Q10 MIN PRN, Jimmy Birch M.D.  •  carBAMazepine (TEGRETOL) tablet 200 mg, 200 mg, Oral, BID, Jimmy Birch M.D., 200 mg at 09/20/18 0535  •  ibuprofen (MOTRIN) tablet 200 mg, 200 mg, Oral, Q6HRS PRN, Jimmy Birch M.D., 200 mg at 09/18/18 1556  •  LORazepam (ATIVAN) injection 0.5-1 mg, 0.5-1 mg, Intravenous, Q6HRS PRN, Jimmy Birch M.D.  •  levETIRAcetam (KEPPRA) 750 mg in  mL IVPB, 750 mg, Intravenous, Q12HRS, Isidro Ness M.D., Stopped at 09/20/18 0549     Labs:  Recent Labs      09/19/18   0344  09/20/18   0309   SODIUM  141  141   POTASSIUM  3.8  3.7   CHLORIDE  109  106   CO2  24  26   BUN  5*  13   CREATININE  0.57   0.65   CALCIUM  7.8*  8.5       Recent Labs      09/19/18 0344  09/20/18   0309   ALTSGPT  10   --    ASTSGOT  12   --    ALKPHOSPHAT  61   --    TBILIRUBIN  0.4   --    GLUCOSE  94  101*       Recent Labs      09/19/18 0344  09/20/18   0309   RBC  4.57  4.33   HEMOGLOBIN  13.6  12.9   HEMATOCRIT  40.8  38.4   PLATELETCT  120*  149*       Recent Labs      09/19/18 0344  09/20/18   0309   WBC  6.7  4.9   NEUTSPOLYS   --   65.80   LYMPHOCYTES   --   27.10   MONOCYTES   --   5.70   EOSINOPHILS   --   0.00   BASOPHILS   --   0.20   ASTSGOT  12   --    ALTSGPT  10   --    ALKPHOSPHAT  61   --    TBILIRUBIN  0.4   --        ECG: QTc = 396    Cranial Imaging: reviewed      ASSESSMENT:   Post-ictal mood symptoms vs adjustment disorder with depressed mood  Seizure disorder    PLAN:  Geodon 20mg PO BID for mood symptoms/lability/clear thinking, for now   Discontinue PRN phenergan and compazine for possible reaction with Geodon if used   Legal status: extended  Anticipate F/U within 48 hours  Records reviewed  Case discussed with Dr. Carranza   Will follow  Thank you for the consult.

## 2018-09-20 NOTE — PROGRESS NOTES
"Assumed pt care at 1900. Pt calm, in bed. q 15 minute checks done. Suicidal precautions in place. No distress. VSS Blood pressure 128/87, pulse 89, temperature 36.9 °C (98.4 °F), resp. rate 14, height 1.626 m (5' 4.02\"), weight 48.3 kg (106 lb 7.7 oz), last menstrual period 09/18/2018, SpO2 99 %, not currently breastfeeding.    Room air. Fall precautions in place. Safety maintained. Will monitor and continue plan of care.   "

## 2018-09-20 NOTE — PROGRESS NOTES
Hospital Medicine Daily Progress Note    Date of Service  9/19/2018    Chief Complaint  33 y.o. female admitted 9/18/2018 with seizure and altered mental status    Hospital Course    33 y.o. female admitted 9/18/2018 with seizure and altered mental status      Interval Problem Update  9/19: Neuro saw patient and recommended dc carbamazepine and start levetiracetam as well as Ativan PRN and Geodon. Reported to DMV.   Total time: 37 minutes. Of this time, greater than 50% was spent counseling and coordinating care including discussion of suicidal ideation, which the expressed suicidal ideation via video  and was placed on a legal hold. Psych was consulted. EEG on 9/18 was unremarkable.    Disposition  Now on legal hold.    Review of Systems  Review of Systems   Unable to perform ROS: Psychiatric disorder        Physical Exam  Temp:  [36.6 °C (97.8 °F)-37.3 °C (99.1 °F)] 36.8 °C (98.2 °F)  Pulse:  [61-96] 96  Resp:  [16-18] 16  BP: (103-137)/(57-92) 137/92    Physical Exam   Constitutional: She appears well-developed.   HENT:   Head: Normocephalic.   Eyes: Conjunctivae are normal.   Cardiovascular: Normal rate.  Exam reveals no gallop.    Pulmonary/Chest: No respiratory distress. She has no wheezes.   Abdominal: She exhibits no distension. There is no tenderness.   Neurological: She is alert.   Talkative in mostly Macedonian    Psychiatric:   Tangential and unable to provide straight answers to questions       Fluids    Intake/Output Summary (Last 24 hours) at 09/19/18 9448  Last data filed at 09/19/18 1500   Gross per 24 hour   Intake             2533 ml   Output                0 ml   Net             2533 ml       Laboratory  Recent Labs      09/19/18   0344   WBC  6.7   RBC  4.57   HEMOGLOBIN  13.6   HEMATOCRIT  40.8   MCV  89.3   MCH  29.8   MCHC  33.3*   RDW  41.1   PLATELETCT  120*   MPV  9.7     Recent Labs      09/19/18   0344   SODIUM  141   POTASSIUM  3.8   CHLORIDE  109   CO2  24   GLUCOSE  94    BUN  5*   CREATININE  0.57   CALCIUM  7.8*                   Imaging  No orders to display        Assessment/Plan  Abnormal EKG   Assessment & Plan    - Patient had upsloping ST segments on EKG which were read by the machine as acute MI  - Patient has no symptoms of acute MI, I read the EKG as artifact and early repolarization  - Checking troponin for completeness        Suicidal ideations   Assessment & Plan    - Patient describes multiple reasons for this including family related  - Placed on legal hold  - Psych consulted        Urinary tract infection with hematuria- (present on admission)   Assessment & Plan    + UA from outside facility  Initially started on IV ceftriaxone  Discussed with antimicrobial stewardship, no need for further antibiotics  DC ceftriaxone        Seizure disorder (HCC)- (present on admission)   Assessment & Plan    Plan as above        Acute encephalopathy- (present on admission)   Assessment & Plan    She is on trileptal  On carbamazepine  Carbamazepine level unremarkable at eight   Neuro saw patient and recommended dc carbamazepine and start levetiracetam as well as Ativan PRN and Geodon. Reported to DMV.

## 2018-09-20 NOTE — PSYCHIATRY
BRIEF PSYCHIATRIC CONSULT NOTE:  Pt still appears to be somewhat disoriented and post ictal. Denies depressive symptoms and denies suicidal ideation but remains tearful and upset throughout interview. Will keep legal hold in place for now. patient seen, full note to follow.  -Legal Hold:extended  -Sitter:yes  -Restrictions:   -phone: may have   -visitors: may have   -personal items: yes and supervised   -finger foods required: no   -personal clothes: no  -Orders Placed: routine  -Plan:continue to follow

## 2018-09-20 NOTE — DISCHARGE PLANNING
"Care Transition Team Assessment    LSW met with patient and spouse, Trena at bedside utilizing translation services.  Patient lives with spouse, Trena and three young daughters.  Patient states that she is concerned about finances and \"not being a citizen.\"  Patient's spouse, Trena, stated that his monthly income is $2400.  Patient does not have health insurance or prescription coverage and will be requiring Tegretol and Keppra upon d/c.  Patient states that her main concern is about affording her hospital stay.  LSW provided patient and spouse with information about PFA and arranging a payment plan.  Patient expresses SI \"I'm so depressed. I want to die.\"  Patient reports past instances of self harm, including cutting and hitting herself in times of distress.  Charge RN, Tran, and JORY Stewart aware.     Information Source  Orientation : Disoriented to Time  Information Given By: Patient, Spouse  Informant's Name:  (Spouse: Trena)  Who is responsible for making decisions for patient? : Patient    Readmission Evaluation  Is this a readmission?: No    Elopement Risk  Legal Hold: Elopement Risk  Ambulatory or Self Mobile in Wheelchair: Yes  Time of Legal Hold: 1515  Date of Legal Hold: 09/19/18    Interdisciplinary Discharge Planning  Does Admitting Nurse Feel This Could be a Complex Discharge?: No  Lives with - Patient's Self Care Capacity: Spouse  Patient or legal guardian wants to designate a caregiver (see row info): No  Support Systems: Family Member(s)  Do You Take your Prescribed Medications Regularly: No    Discharge Preparedness  What is your plan after discharge?: Uncertain - pending medical team collaboration  What are your discharge supports?: Spouse  Prior Functional Level: Ambulatory, Independent with Activities of Daily Living, Independent with Medication Management    Functional Assesment  Prior Functional Level: Ambulatory, Independent with Activities of Daily Living, " Independent with Medication Management    Finances  Financial Barriers to Discharge: Yes  Average Monthly Income:  ($2400)  Source of Income:  (Spouse)  Prescription Coverage: No    Vision / Hearing Impairment  Vision Impairment : No  Hearing Impairment : No    Values / Beliefs / Concerns  Values / Beliefs Concerns : No    Advance Directive  Advance Directive?: None  Advance Directive offered?: AD Booklet refused    Domestic Abuse  Have you ever been the victim of abuse or violence?: No  Physical Abuse or Sexual Abuse: No  Verbal Abuse or Emotional Abuse: No  Possible Abuse Reported to:: Not Applicable    Psychological Assessment  History of Substance Abuse: None  History of Psychiatric Problems: Yes  Non-compliant with Treatment: No  Newly Diagnosed Illness: No    Discharge Risks or Barriers  Discharge risks or barriers?: Uninsured / underinsured, Mental health  Patient risk factors: Mental health, Multiple ED visits, Uninsured or underinsured    Anticipated Discharge Information  Anticipated discharge disposition:  ([address] [place] [address])  Discharge Contact Phone Number:  ([phone])

## 2018-09-21 LAB
ALBUMIN SERPL BCP-MCNC: 3.5 G/DL (ref 3.2–4.9)
ALBUMIN/GLOB SERPL: 1.5 G/DL
ALP SERPL-CCNC: 58 U/L (ref 30–99)
ALT SERPL-CCNC: 7 U/L (ref 2–50)
ANION GAP SERPL CALC-SCNC: 7 MMOL/L (ref 0–11.9)
AST SERPL-CCNC: 8 U/L (ref 12–45)
BASOPHILS # BLD AUTO: 0.2 % (ref 0–1.8)
BASOPHILS # BLD: 0.01 K/UL (ref 0–0.12)
BILIRUB SERPL-MCNC: 0.3 MG/DL (ref 0.1–1.5)
BUN SERPL-MCNC: 16 MG/DL (ref 8–22)
CALCIUM SERPL-MCNC: 8.5 MG/DL (ref 8.5–10.5)
CHLORIDE SERPL-SCNC: 108 MMOL/L (ref 96–112)
CO2 SERPL-SCNC: 26 MMOL/L (ref 20–33)
CREAT SERPL-MCNC: 0.61 MG/DL (ref 0.5–1.4)
EOSINOPHIL # BLD AUTO: 0.01 K/UL (ref 0–0.51)
EOSINOPHIL NFR BLD: 0.2 % (ref 0–6.9)
ERYTHROCYTE [DISTWIDTH] IN BLOOD BY AUTOMATED COUNT: 41.5 FL (ref 35.9–50)
GLOBULIN SER CALC-MCNC: 2.3 G/DL (ref 1.9–3.5)
GLUCOSE SERPL-MCNC: 109 MG/DL (ref 65–99)
HCT VFR BLD AUTO: 39.8 % (ref 37–47)
HGB BLD-MCNC: 13.1 G/DL (ref 12–16)
IMM GRANULOCYTES # BLD AUTO: 0.01 K/UL (ref 0–0.11)
IMM GRANULOCYTES NFR BLD AUTO: 0.2 % (ref 0–0.9)
LYMPHOCYTES # BLD AUTO: 1.62 K/UL (ref 1–4.8)
LYMPHOCYTES NFR BLD: 35.5 % (ref 22–41)
MAGNESIUM SERPL-MCNC: 2.1 MG/DL (ref 1.5–2.5)
MCH RBC QN AUTO: 29.6 PG (ref 27–33)
MCHC RBC AUTO-ENTMCNC: 32.9 G/DL (ref 33.6–35)
MCV RBC AUTO: 89.8 FL (ref 81.4–97.8)
MONOCYTES # BLD AUTO: 0.29 K/UL (ref 0–0.85)
MONOCYTES NFR BLD AUTO: 6.4 % (ref 0–13.4)
NEUTROPHILS # BLD AUTO: 2.62 K/UL (ref 2–7.15)
NEUTROPHILS NFR BLD: 57.5 % (ref 44–72)
NRBC # BLD AUTO: 0 K/UL
NRBC BLD-RTO: 0 /100 WBC
PLATELET # BLD AUTO: 152 K/UL (ref 164–446)
PMV BLD AUTO: 10 FL (ref 9–12.9)
POTASSIUM SERPL-SCNC: 4.3 MMOL/L (ref 3.6–5.5)
PROT SERPL-MCNC: 5.8 G/DL (ref 6–8.2)
RBC # BLD AUTO: 4.43 M/UL (ref 4.2–5.4)
SODIUM SERPL-SCNC: 141 MMOL/L (ref 135–145)
WBC # BLD AUTO: 4.6 K/UL (ref 4.8–10.8)

## 2018-09-21 PROCEDURE — 99232 SBSQ HOSP IP/OBS MODERATE 35: CPT | Performed by: HOSPITALIST

## 2018-09-21 PROCEDURE — A9270 NON-COVERED ITEM OR SERVICE: HCPCS | Performed by: INTERNAL MEDICINE

## 2018-09-21 PROCEDURE — 83735 ASSAY OF MAGNESIUM: CPT

## 2018-09-21 PROCEDURE — 770006 HCHG ROOM/CARE - MED/SURG/GYN SEMI*

## 2018-09-21 PROCEDURE — 700102 HCHG RX REV CODE 250 W/ 637 OVERRIDE(OP): Performed by: INTERNAL MEDICINE

## 2018-09-21 PROCEDURE — 36415 COLL VENOUS BLD VENIPUNCTURE: CPT

## 2018-09-21 PROCEDURE — A9270 NON-COVERED ITEM OR SERVICE: HCPCS | Performed by: HOSPITALIST

## 2018-09-21 PROCEDURE — 85025 COMPLETE CBC W/AUTO DIFF WBC: CPT

## 2018-09-21 PROCEDURE — A9270 NON-COVERED ITEM OR SERVICE: HCPCS | Performed by: PSYCHIATRY & NEUROLOGY

## 2018-09-21 PROCEDURE — 700102 HCHG RX REV CODE 250 W/ 637 OVERRIDE(OP): Performed by: HOSPITALIST

## 2018-09-21 PROCEDURE — 700102 HCHG RX REV CODE 250 W/ 637 OVERRIDE(OP): Performed by: PSYCHIATRY & NEUROLOGY

## 2018-09-21 PROCEDURE — 80053 COMPREHEN METABOLIC PANEL: CPT

## 2018-09-21 PROCEDURE — 700111 HCHG RX REV CODE 636 W/ 250 OVERRIDE (IP): Performed by: INTERNAL MEDICINE

## 2018-09-21 RX ORDER — CARBAMAZEPINE 200 MG/1
200 TABLET ORAL DAILY
Status: COMPLETED | OUTPATIENT
Start: 2018-09-21 | End: 2018-09-25

## 2018-09-21 RX ORDER — CARBAMAZEPINE 100 MG/1
100 TABLET, CHEWABLE ORAL DAILY
Status: DISCONTINUED | OUTPATIENT
Start: 2018-09-26 | End: 2018-09-28 | Stop reason: HOSPADM

## 2018-09-21 RX ADMIN — LEVETIRACETAM 750 MG: 250 TABLET ORAL at 19:47

## 2018-09-21 RX ADMIN — ZIPRASIDONE HCL 20 MG: 20 CAPSULE ORAL at 05:56

## 2018-09-21 RX ADMIN — ENOXAPARIN SODIUM 40 MG: 40 INJECTION, SOLUTION INTRAVENOUS; SUBCUTANEOUS at 05:56

## 2018-09-21 RX ADMIN — ZIPRASIDONE HCL 20 MG: 20 CAPSULE ORAL at 19:10

## 2018-09-21 RX ADMIN — LEVETIRACETAM 750 MG: 250 TABLET ORAL at 05:56

## 2018-09-21 RX ADMIN — ACETAMINOPHEN 650 MG: 325 TABLET, FILM COATED ORAL at 20:26

## 2018-09-21 RX ADMIN — CARBAMAZEPINE 200 MG: 200 TABLET ORAL at 14:21

## 2018-09-21 RX ADMIN — STANDARDIZED SENNA CONCENTRATE AND DOCUSATE SODIUM 2 TABLET: 8.6; 5 TABLET ORAL at 19:10

## 2018-09-21 ASSESSMENT — PAIN SCALES - GENERAL
PAINLEVEL_OUTOF10: 3
PAINLEVEL_OUTOF10: 0

## 2018-09-21 NOTE — PROGRESS NOTES
[facility] Medicine Daily Progress Note    Date of Service  9/21/2018    Chief Complaint  33 y.o. female admitted 9/18/2018 with seizure and altered mental status    Interval Problem Update  -Today patient more lucid and able to hold somewhat of a conversation and answer questions appropriately, however continues to be intermittently confused more so with time, place, and situation. No seizure activity noted over the last 24 hours.  No fevers or leukocytosis or indication of infectious process.  -Patient today continues to endorse depressed mood, however states feeling less anxious and no reports of SI or HI today.  -Unable to report to [facility] as patient has no current 's license and admitted to not being a [place] citizen.  -Psychiatry extended legal hold recommendations as stated below    Disposition  To be determined on legal hold.  Emergency Medicaid application being submitted in order to help with payment of antiepileptic medications and placement.    Review of Systems  Review of Systems   Reason unable to perform ROS: Continues to be inconsistent and confused due to psychiatric disorder.        Physical Exam  Temp:  [36.6 °C (97.9 °F)-37.1 °C (98.7 °F)] 36.6 °C (97.9 °F)  Pulse:  [69-79] 69  Resp:  [14-16] 14  BP: (107-113)/(52-65) 113/65    Physical Exam   Constitutional: She appears well-developed. No distress.   Flat affect     HENT:   Head: Normocephalic and atraumatic.   Eyes: Pupils are equal, round, and reactive to light. Conjunctivae are normal.   Neck: Normal range of motion. Neck supple.   Cardiovascular: Normal rate, normal heart sounds and intact distal pulses.  Exam reveals no gallop.    Pulmonary/Chest: Effort normal. No respiratory distress. She has no wheezes.   Abdominal: Soft. She exhibits no distension. There is no tenderness.   Musculoskeletal: Normal range of motion.   Neurological: She is alert. GCS eye subscore is 4. GCS verbal subscore is 4. GCS motor subscore is 6.   Intermittently  confused and oriented to self and place place and more confused with time and situation  Mozambican-speaking only   Psychiatric: Her mood appears not anxious. Her affect is not labile. Her speech is tangential. She is withdrawn. Thought content is not paranoid and not delusional. She expresses inappropriate judgment. She does not express impulsivity. She exhibits a depressed mood. She expresses no homicidal and no suicidal ideation. She expresses no suicidal plans and no homicidal plans. She exhibits abnormal remote memory. She exhibits normal recent memory.   Mood more stable today and patient seems more optimistic and calmer       Fluids    Intake/Output Summary (Last 24 hours) at 09/21/18 0829  Last data filed at 09/20/18 1745   Gross per 24 hour   Intake              820 ml   Output                0 ml   Net              820 ml       Laboratory  Recent Labs      09/19/18   0344  09/20/18   0309  09/21/18   0356   WBC  6.7  4.9  4.6*   RBC  4.57  4.33  4.43   HEMOGLOBIN  13.6  12.9  13.1   HEMATOCRIT  40.8  38.4  39.8   MCV  89.3  88.7  89.8   MCH  29.8  29.8  29.6   MCHC  33.3*  33.6  32.9*   RDW  41.1  40.5  41.5   PLATELETCT  120*  149*  152*   MPV  9.7  10.2  10.0     Recent Labs      09/19/18   0344  09/20/18   0309  09/21/18   0356   SODIUM  141  141  141   POTASSIUM  3.8  3.7  4.3   CHLORIDE  109  106  108   CO2  24  26  26   GLUCOSE  94  101*  109*   BUN  5*  13  16   CREATININE  0.57  0.65  0.61   CALCIUM  7.8*  8.5  8.5               Assessment/Plan  Abnormal EKG   Assessment & Plan    Patient with no current chest pain or any complaints of cardiac issues    -EKG with upsloping ST segments on EKG which were read by the machine as acute MI  -Patient has no symptoms of acute MI, I read the EKG as artifact and early repolarization  -Troponins negative  -Will monitor        Suicidal ideations   Assessment & Plan    Patient today continues to endorse depressed mood, however states feeling less anxious and  "stating \"I want to get better\", and stating \"I do not want to hurt myself I want to get better\".      -Psychiatry following   -Continue Geodon 20mg PO BID   -Discontinue PRN phenergan and compazine for possible reaction with Geodon if used   -Legal status: extended and will follow up with patient in the next 48 hours  -Sitter at bedside if able        Urinary tract infection with hematuria- (present on admission)   Assessment & Plan    + UA from outside facility and initially started on IV ceftriaxone.  Discussed with antimicrobial stewardship, no need for further antibiotics.  Subsequently ceftriaxone discontinued.     -Continues with no complaints of dysuria, frequency or urgency        Seizure disorder (HCC)- (present on admission)   Assessment & Plan    No current seizures over past 24 hours.      -Plan as stated previous        Acute encephalopathy- (present on admission)   Assessment & Plan    Patient unreliable historian secondary to language barrier.  Questionable if patient is consistent with taking antiepileptic medications due to financial concerns expressed repeatedly by patient.     -Today patient more lucid and able to hold somewhat of a conversation and answer questions appropriately, however continues to be intermittently confused more so with time, place, and situation. No seizure activity noted over the last 24 hours.  No fevers or leukocytosis or indication of infectious process.  -Discontinue carbamazepine and continue Keppra per neurology, and patient not onTrileptal  -Carbamazepine level unremarkable at 8  -Continue on Ativan PRN and Geodon  -Unable to report to [facility] as patient has no current 's license and admitted to not being a [place] citizen.  -Psychiatry following patient          DVT:  Lovenox/SCDs      LILIBETH Worthington.    "

## 2018-09-21 NOTE — CARE PLAN
Problem: Nutritional:  Goal: Achieve adequate nutritional intake  Patient will consume >50% of meals   Outcome: MET Date Met: 09/21/18  Per chart review of ADL's, pt has been consuming % of meals.  Consult RD as needed, available prn and to rescreen per department policy.

## 2018-09-21 NOTE — DISCHARGE PLANNING
Agency/Facility Name: [facility], [facility], [facility]  Plan or Request: Received Legal Hold paperwork from [employer]([facility]). Mental health referral faxed to facilities listed above @ [phone].

## 2018-09-21 NOTE — CARE PLAN
Problem: Safety  Goal: Will remain free from injury  Outcome: PROGRESSING AS EXPECTED  Safety precautions in place. q 15 minute checks    Goal: Will remain free from falls  Outcome: PROGRESSING AS EXPECTED

## 2018-09-21 NOTE — PROGRESS NOTES
"Hospital Medicine Daily Progress Note    Date of Service  9/20/2018    Chief Complaint  33 y.o. female admitted 9/18/2018 with seizure and altered mental status    Interval Problem Update  -Patient with no seizure activity noted over the last 24 hours.  No fevers or leukocytosis or indication of infectious process. Patient continues to endorse depressed mood, and reports feeling anxious about when and where her next seizure will occur. Patient expresses SI \"I'm so depressed. I want to die.\"  Patient reports past instances of self harm, including cutting and hitting herself in times of distress.  -Unable to report to [facility] as patient has no current 's license and admitted to not being a [place] citizen.  -Psychiatry extended legal hold recommendations as stated below    Disposition  To be determined on legal hold.    Review of Systems  Review of Systems   Unable to perform ROS: Psychiatric disorder        Physical Exam  Temp:  [36.2 °C (97.2 °F)-37.1 °C (98.7 °F)] 37.1 °C (98.7 °F)  Pulse:  [70-89] 78  Resp:  [14-16] 16  BP: (107-128)/(64-87) 107/64    Physical Exam   Constitutional: She appears well-developed.   HENT:   Head: Normocephalic.   Eyes: Pupils are equal, round, and reactive to light.   Neck: Normal range of motion. Neck supple.   Cardiovascular: Normal rate, normal heart sounds and intact distal pulses.  Exam reveals no gallop.    Pulmonary/Chest: No respiratory distress. She has no wheezes.   Abdominal: She exhibits no distension. There is no tenderness.   Musculoskeletal: Normal range of motion.   Neurological: She is alert. GCS eye subscore is 4. GCS verbal subscore is 4. GCS motor subscore is 6.   Intermittently confused and oriented to self, place and more confused with time   Marshallese-speaking only   Psychiatric: Her mood appears anxious. Her affect is labile. Her speech is tangential. She is withdrawn. She expresses inappropriate judgment. She exhibits a depressed mood. She expresses suicidal " "ideation. She expresses no homicidal ideation.   Continues with inability to provide straight answers to questions, and anxiousness with mood labile and intermittent crying       Fluids  No intake or output data in the 24 hours ending 09/20/18 1752    Laboratory  Recent Labs      09/19/18 0344  09/20/18   0309   WBC  6.7  4.9   RBC  4.57  4.33   HEMOGLOBIN  13.6  12.9   HEMATOCRIT  40.8  38.4   MCV  89.3  88.7   MCH  29.8  29.8   MCHC  33.3*  33.6   RDW  41.1  40.5   PLATELETCT  120*  149*   MPV  9.7  10.2     Recent Labs      09/19/18   0344  09/20/18   0309   SODIUM  141  141   POTASSIUM  3.8  3.7   CHLORIDE  109  106   CO2  24  26   GLUCOSE  94  101*   BUN  5*  13   CREATININE  0.57  0.65   CALCIUM  7.8*  8.5               Assessment/Plan  Abnormal EKG   Assessment & Plan    Patient with no current chest pain or any complaints of cardiac issues    -EKG with upsloping ST segments on EKG which were read by the machine as acute MI  -Patient has no symptoms of acute MI, I read the EKG as artifact and early repolarization  -Troponins negative  -Will monitor        Suicidal ideations   Assessment & Plan    Patient continues to endorse depressed mood, and reports feeling anxious about when and where her next seizure will occur. Patient expresses SI \"I'm so depressed. I want to die.\"  Patient reports past instances of self harm, including cutting and hitting herself in times of distress.    -Psychiatry following and stating post-ictal mood symptoms vs adjustment disorder with depressed mood, and seizure disorder     PLAN:  Geodon 20mg PO BID   Discontinue PRN phenergan and compazine for possible reaction with Geodon if used   Legal status: extended and will follow up with patient in the next 48 hours  Sitter at bedside        Urinary tract infection with hematuria- (present on admission)   Assessment & Plan    + UA from outside facility and initially started on IV ceftriaxone.  Discussed with antimicrobial stewardship, " no need for further antibiotics.  Subsequently ceftriaxone discontinued.     -No current complaints of dysuria, frequency or urgency        Seizure disorder (HCC)- (present on admission)   Assessment & Plan    No current seizures over past 24 hours.      -Plan as stated previous        Acute encephalopathy- (present on admission)   Assessment & Plan    Patient on reliable historian secondary to language barrier.  Questionable if patient is consistent with taking antiepileptic medications due to financial concerns expressed repeatedly by patient.  Patient continues to be intermittently confused, with no seizure activity noted over the last 24 hours.  No fevers or leukocytosis or indication of infectious process.    -Continue Trileptal and carbamazepine  -Carbamazepine level unremarkable at eight  -Neuro saw patient and recommended dc carbamazepine and start levetiracetam as well as Ativan PRN and Geodon.   -Unable to report to [facility] as patient has no current 's license and admitted to not being a [place] citizen.  -Psychiatry following patient           HOLLAND Worthington

## 2018-09-22 PROCEDURE — 770006 HCHG ROOM/CARE - MED/SURG/GYN SEMI*

## 2018-09-22 PROCEDURE — 700102 HCHG RX REV CODE 250 W/ 637 OVERRIDE(OP): Performed by: INTERNAL MEDICINE

## 2018-09-22 PROCEDURE — 99232 SBSQ HOSP IP/OBS MODERATE 35: CPT | Performed by: HOSPITALIST

## 2018-09-22 PROCEDURE — 700102 HCHG RX REV CODE 250 W/ 637 OVERRIDE(OP): Performed by: PSYCHIATRY & NEUROLOGY

## 2018-09-22 PROCEDURE — A9270 NON-COVERED ITEM OR SERVICE: HCPCS | Performed by: PSYCHIATRY & NEUROLOGY

## 2018-09-22 PROCEDURE — A9270 NON-COVERED ITEM OR SERVICE: HCPCS | Performed by: HOSPITALIST

## 2018-09-22 PROCEDURE — A9270 NON-COVERED ITEM OR SERVICE: HCPCS | Performed by: INTERNAL MEDICINE

## 2018-09-22 PROCEDURE — 700102 HCHG RX REV CODE 250 W/ 637 OVERRIDE(OP): Performed by: HOSPITALIST

## 2018-09-22 PROCEDURE — 700111 HCHG RX REV CODE 636 W/ 250 OVERRIDE (IP): Performed by: INTERNAL MEDICINE

## 2018-09-22 RX ADMIN — STANDARDIZED SENNA CONCENTRATE AND DOCUSATE SODIUM 2 TABLET: 8.6; 5 TABLET ORAL at 05:16

## 2018-09-22 RX ADMIN — ZIPRASIDONE HCL 20 MG: 20 CAPSULE ORAL at 18:26

## 2018-09-22 RX ADMIN — ENOXAPARIN SODIUM 40 MG: 40 INJECTION, SOLUTION INTRAVENOUS; SUBCUTANEOUS at 05:16

## 2018-09-22 RX ADMIN — ACETAMINOPHEN 650 MG: 325 TABLET, FILM COATED ORAL at 21:40

## 2018-09-22 RX ADMIN — IBUPROFEN 200 MG: 200 TABLET, FILM COATED ORAL at 18:26

## 2018-09-22 RX ADMIN — CARBAMAZEPINE 200 MG: 200 TABLET ORAL at 05:16

## 2018-09-22 RX ADMIN — LEVETIRACETAM 750 MG: 250 TABLET ORAL at 05:16

## 2018-09-22 RX ADMIN — STANDARDIZED SENNA CONCENTRATE AND DOCUSATE SODIUM 2 TABLET: 8.6; 5 TABLET ORAL at 18:26

## 2018-09-22 RX ADMIN — LEVETIRACETAM 750 MG: 250 TABLET ORAL at 18:27

## 2018-09-22 RX ADMIN — ZIPRASIDONE HCL 20 MG: 20 CAPSULE ORAL at 05:16

## 2018-09-22 ASSESSMENT — PAIN SCALES - GENERAL
PAINLEVEL_OUTOF10: 0
PAINLEVEL_OUTOF10: 1

## 2018-09-22 NOTE — CARE PLAN
Problem: Safety  Goal: Will remain free from injury    Intervention: Provide assistance with mobility  Pt verbalize understanding to use call light for assistance.     Goal: Will remain free from falls  Outcome: MET Date Met: 09/21/18

## 2018-09-22 NOTE — PROGRESS NOTES
[facility] Medicine Daily Progress Note    Date of Service  9/22/2018    Chief Complaint  33 y.o. female admitted 9/18/2018 with seizure and altered mental status    Interval Problem Update  -Continues to be more lucid and able to hold a conversation and answer questions appropriately, however continues intermittently confused more so with time, place, and situation. No seizure activity noted over the last 24 hours.   -Patient today with mood slightly improved but remains with flat affect, however states feeling less anxious and no reports of SI or HI today.  -Unable to report to [facility] as patient has no current 's license and admitted to not being a [place] citizen.  -Psychiatry extended legal hold and will get in touch with psych to f/u on legal hold    Disposition  To be determined on legal hold.  Emergency Medicaid application being submitted in order to help with payment of antiepileptic medications and placement.    Review of Systems  Review of Systems   Reason unable to perform ROS: Continues to be inconsistent and confused due to psychiatric disorder.        Physical Exam  Temp:  [36.5 °C (97.7 °F)-37.1 °C (98.7 °F)] 37 °C (98.6 °F)  Pulse:  [64-87] 71  Resp:  [16] 16  BP: (106-132)/(66-79) 106/66    Physical Exam   Constitutional: She appears well-developed. No distress.   Flat affect     HENT:   Head: Normocephalic and atraumatic.   Eyes: Pupils are equal, round, and reactive to light. EOM are normal.   Neck: Normal range of motion. Neck supple.   Cardiovascular: Normal rate, normal heart sounds and intact distal pulses.  Exam reveals no gallop.    Pulmonary/Chest: Effort normal. No respiratory distress. She has no wheezes.   Abdominal: Soft. She exhibits no distension. There is no tenderness.   Musculoskeletal: Normal range of motion.   Neurological: She is alert. She has normal strength. Gait normal.   Intermittently confused and oriented to self and place place and more confused with time and  "situation  Italian-speaking only   Skin: Skin is warm and dry.   Abrasions to the left side of face   Psychiatric: Her mood appears not anxious. Her affect is not labile. She is withdrawn. Thought content is not paranoid and not delusional. She does not express impulsivity or inappropriate judgment. She exhibits a depressed mood. She expresses no homicidal and no suicidal ideation. She expresses no suicidal plans and no homicidal plans. She exhibits abnormal remote memory. She exhibits normal recent memory.   Mood more stable and patient seems calmer        Fluids    Intake/Output Summary (Last 24 hours) at 09/22/18 1503  Last data filed at 09/21/18 1730   Gross per 24 hour   Intake              380 ml   Output                0 ml   Net              380 ml       Laboratory  Recent Labs      09/20/18   0309  09/21/18   0356   WBC  4.9  4.6*   RBC  4.33  4.43   HEMOGLOBIN  12.9  13.1   HEMATOCRIT  38.4  39.8   MCV  88.7  89.8   MCH  29.8  29.6   MCHC  33.6  32.9*   RDW  40.5  41.5   PLATELETCT  149*  152*   MPV  10.2  10.0     Recent Labs      09/20/18   0309  09/21/18   0356   SODIUM  141  141   POTASSIUM  3.7  4.3   CHLORIDE  106  108   CO2  26  26   GLUCOSE  101*  109*   BUN  13  16   CREATININE  0.65  0.61   CALCIUM  8.5  8.5               Assessment/Plan  Abnormal EKG   Assessment & Plan    Patient with no current chest pain or any complaints of cardiac issues    -EKG with upsloping ST segments on EKG which were read by the machine as acute MI  -Patient has no symptoms of acute MI, I read the EKG as artifact and early repolarization  -Troponins negative  -Will monitor        Suicidal ideations   Assessment & Plan    Patient today continues to endorse depressed mood, however states feeling less anxious and stating \"I want to get better\", and stating \"I do not want to hurt myself I want to get better\".      -Psychiatry following   -Continue Geodon 20mg PO BID   -Discontinue PRN phenergan and compazine for possible " reaction with Geodon if used   -Legal status: extended and will have psych f/u   -Sitter at bedside if able        Urinary tract infection with hematuria- (present on admission)   Assessment & Plan    + UA from outside facility and initially started on IV ceftriaxone.  Discussed with antimicrobial stewardship, no need for further antibiotics.  Subsequently ceftriaxone discontinued.     -Continues with no complaints of dysuria, frequency or urgency        Seizure disorder (HCC)- (present on admission)   Assessment & Plan    No current seizures over past 24 hours.      -Plan as stated previous        Acute encephalopathy- (present on admission)   Assessment & Plan    Patient unreliable historian secondary to language barrier.  Questionable if patient is consistent with taking antiepileptic medications due to financial concerns expressed repeatedly by patient.     -Continues to be more lucid and able to hold a conversation and answer questions appropriately, however continues intermittently confused more so with time, place, and situation. No seizure activity noted over the last 24 hours.  No fevers or leukocytosis or indication of infectious process.  -Continue Keppra per neurology  -Carbamazepine level unremarkable at 8  -Continue on Ativan PRN and Geodon  -Unable to report to [facility] as patient has no current 's license and admitted to not being a [place] citizen.  -Psychiatry following patient and will contact to have them follow-up with legal hold          DVT:  Lovenox/SCDs      LILIBETH Worthington.

## 2018-09-22 NOTE — PROGRESS NOTES
"Assumed pt care at 1900. Pt a+ox4. No distress.   VSS   Blood pressure 120/79, pulse 87, temperature 36.6 °C (97.8 °F), resp. rate 16, height 1.626 m (5' 4.02\"), weight 48.3 kg (106 lb 7.7 oz), last menstrual period 09/18/2018, SpO2 97 %, not currently breastfeeding.  room air   Fall precautions in place. Safety checks q15 minutes. Call light within reach. Will monitor and continue plan of care.   "

## 2018-09-22 NOTE — PROGRESS NOTES
"Patient continually checked up on every 15 minutes per SAD score. Patient reported feelings of depression during morning assessment and desire to \"be cured\" of her condition. Patient continually awake and oriented throughout shift with seizure precautions in place. Bed is in lowest position with call light within reach. No further needs expressed at this time.    "

## 2018-09-23 PROBLEM — N39.0 URINARY TRACT INFECTION WITH HEMATURIA: Status: RESOLVED | Noted: 2018-09-18 | Resolved: 2018-09-23

## 2018-09-23 PROBLEM — R31.9 URINARY TRACT INFECTION WITH HEMATURIA: Status: RESOLVED | Noted: 2018-09-18 | Resolved: 2018-09-23

## 2018-09-23 PROCEDURE — 770006 HCHG ROOM/CARE - MED/SURG/GYN SEMI*

## 2018-09-23 PROCEDURE — 700102 HCHG RX REV CODE 250 W/ 637 OVERRIDE(OP): Performed by: HOSPITALIST

## 2018-09-23 PROCEDURE — A9270 NON-COVERED ITEM OR SERVICE: HCPCS | Performed by: HOSPITALIST

## 2018-09-23 PROCEDURE — 700111 HCHG RX REV CODE 636 W/ 250 OVERRIDE (IP): Performed by: INTERNAL MEDICINE

## 2018-09-23 PROCEDURE — A9270 NON-COVERED ITEM OR SERVICE: HCPCS | Performed by: PSYCHIATRY & NEUROLOGY

## 2018-09-23 PROCEDURE — 700102 HCHG RX REV CODE 250 W/ 637 OVERRIDE(OP): Performed by: INTERNAL MEDICINE

## 2018-09-23 PROCEDURE — A9270 NON-COVERED ITEM OR SERVICE: HCPCS | Performed by: INTERNAL MEDICINE

## 2018-09-23 PROCEDURE — 700102 HCHG RX REV CODE 250 W/ 637 OVERRIDE(OP): Performed by: PSYCHIATRY & NEUROLOGY

## 2018-09-23 PROCEDURE — 99232 SBSQ HOSP IP/OBS MODERATE 35: CPT | Performed by: HOSPITALIST

## 2018-09-23 RX ADMIN — LEVETIRACETAM 750 MG: 250 TABLET ORAL at 06:45

## 2018-09-23 RX ADMIN — STANDARDIZED SENNA CONCENTRATE AND DOCUSATE SODIUM 2 TABLET: 8.6; 5 TABLET ORAL at 06:45

## 2018-09-23 RX ADMIN — ACETAMINOPHEN 650 MG: 325 TABLET, FILM COATED ORAL at 20:46

## 2018-09-23 RX ADMIN — ENOXAPARIN SODIUM 40 MG: 40 INJECTION, SOLUTION INTRAVENOUS; SUBCUTANEOUS at 06:44

## 2018-09-23 RX ADMIN — STANDARDIZED SENNA CONCENTRATE AND DOCUSATE SODIUM 2 TABLET: 8.6; 5 TABLET ORAL at 17:03

## 2018-09-23 RX ADMIN — LEVETIRACETAM 750 MG: 250 TABLET ORAL at 17:02

## 2018-09-23 RX ADMIN — ZIPRASIDONE HCL 20 MG: 20 CAPSULE ORAL at 06:45

## 2018-09-23 RX ADMIN — ZIPRASIDONE HCL 20 MG: 20 CAPSULE ORAL at 17:03

## 2018-09-23 RX ADMIN — CARBAMAZEPINE 200 MG: 200 TABLET ORAL at 06:45

## 2018-09-23 RX ADMIN — ACETAMINOPHEN 650 MG: 325 TABLET, FILM COATED ORAL at 14:23

## 2018-09-23 ASSESSMENT — PAIN SCALES - GENERAL
PAINLEVEL_OUTOF10: 5
PAINLEVEL_OUTOF10: 0
PAINLEVEL_OUTOF10: 0

## 2018-09-23 NOTE — PROGRESS NOTES
Continues to be on close observation protocol, no events, pt cooperative with care. Pt pleasant intermittently tearful, verbalizing sadness over her situation and perceived lack of social support.  and two children spent majority of afternoon at bedside.

## 2018-09-23 NOTE — CARE PLAN
Problem: Safety  Goal: Will remain free from injury    Intervention: Provide assistance with mobility   09/23/18 0041   OTHER   Assistance / Tolerance Standby Assist;Assistance of One         Problem: Pain Management  Goal: Pain level will decrease to patient's comfort goal  Complaining of head and neck pain. Heat pack and tylenol given.

## 2018-09-24 PROCEDURE — 700102 HCHG RX REV CODE 250 W/ 637 OVERRIDE(OP): Performed by: PSYCHIATRY & NEUROLOGY

## 2018-09-24 PROCEDURE — 770001 HCHG ROOM/CARE - MED/SURG/GYN PRIV*

## 2018-09-24 PROCEDURE — A9270 NON-COVERED ITEM OR SERVICE: HCPCS | Performed by: HOSPITALIST

## 2018-09-24 PROCEDURE — 700102 HCHG RX REV CODE 250 W/ 637 OVERRIDE(OP): Performed by: HOSPITALIST

## 2018-09-24 PROCEDURE — 700102 HCHG RX REV CODE 250 W/ 637 OVERRIDE(OP): Performed by: NURSE PRACTITIONER

## 2018-09-24 PROCEDURE — 700102 HCHG RX REV CODE 250 W/ 637 OVERRIDE(OP): Performed by: INTERNAL MEDICINE

## 2018-09-24 PROCEDURE — A9270 NON-COVERED ITEM OR SERVICE: HCPCS | Performed by: PSYCHIATRY & NEUROLOGY

## 2018-09-24 PROCEDURE — A9270 NON-COVERED ITEM OR SERVICE: HCPCS | Performed by: NURSE PRACTITIONER

## 2018-09-24 PROCEDURE — 99232 SBSQ HOSP IP/OBS MODERATE 35: CPT | Performed by: HOSPITALIST

## 2018-09-24 PROCEDURE — A9270 NON-COVERED ITEM OR SERVICE: HCPCS | Performed by: INTERNAL MEDICINE

## 2018-09-24 PROCEDURE — 700111 HCHG RX REV CODE 636 W/ 250 OVERRIDE (IP): Performed by: INTERNAL MEDICINE

## 2018-09-24 RX ORDER — ZIPRASIDONE HYDROCHLORIDE 40 MG/1
40 CAPSULE ORAL 2 TIMES DAILY
Status: DISCONTINUED | OUTPATIENT
Start: 2018-09-24 | End: 2018-09-26

## 2018-09-24 RX ADMIN — LEVETIRACETAM 750 MG: 250 TABLET ORAL at 04:59

## 2018-09-24 RX ADMIN — ACETAMINOPHEN 650 MG: 325 TABLET, FILM COATED ORAL at 20:43

## 2018-09-24 RX ADMIN — ENOXAPARIN SODIUM 40 MG: 40 INJECTION, SOLUTION INTRAVENOUS; SUBCUTANEOUS at 05:00

## 2018-09-24 RX ADMIN — ZIPRASIDONE HCL 20 MG: 20 CAPSULE ORAL at 05:00

## 2018-09-24 RX ADMIN — LEVETIRACETAM 750 MG: 250 TABLET ORAL at 17:31

## 2018-09-24 RX ADMIN — ZIPRASIDONE HCL 40 MG: 40 CAPSULE ORAL at 17:31

## 2018-09-24 RX ADMIN — STANDARDIZED SENNA CONCENTRATE AND DOCUSATE SODIUM 2 TABLET: 8.6; 5 TABLET ORAL at 17:31

## 2018-09-24 RX ADMIN — ACETAMINOPHEN 325 MG: 325 TABLET, FILM COATED ORAL at 07:28

## 2018-09-24 RX ADMIN — CARBAMAZEPINE 200 MG: 200 TABLET ORAL at 05:00

## 2018-09-24 ASSESSMENT — PATIENT HEALTH QUESTIONNAIRE - PHQ9
2. FEELING DOWN, DEPRESSED, IRRITABLE, OR HOPELESS: NOT AT ALL
1. LITTLE INTEREST OR PLEASURE IN DOING THINGS: NOT AT ALL
SUM OF ALL RESPONSES TO PHQ9 QUESTIONS 1 AND 2: 0

## 2018-09-24 ASSESSMENT — PAIN SCALES - GENERAL
PAINLEVEL_OUTOF10: 3
PAINLEVEL_OUTOF10: 0

## 2018-09-24 NOTE — DISCHARGE PLANNING
Agency/Facility Name: [facility]  Spoke To: Ashley  Outcome: Did not receive referral, refaxed to [facility].    Agency/Facility Name: [facility]  Spoke To: Yulissa  Outcome: Patient declined due to no insurance.    Agency/Facility Name: [facility]  Spoke To: Florencia  Outcome: Patient pending, no available beds.    Agency/Facility Name: [facility]  Spoke To: Unique  Outcome: Referral pending bed availability.

## 2018-09-24 NOTE — PROGRESS NOTES
Pt A+O x 4 pleasant cooperative, continues on close observation precautions. No safety issues noted

## 2018-09-24 NOTE — PROGRESS NOTES
[facility] Medicine Daily Progress Note    Date of Service  9/23/2018    Chief Complaint  33 y.o. female admitted 9/18/2018 with seizure and altered mental status    Interval Problem Update  -Continues to be more lucid and able to hold a conversation and answer questions appropriately, however continues with depressed mood and intermittent crying throughout the day. Today alert and oriented ×3.  No seizure activity over the last 24 hours.  Denies any other further complaints.  -Patient today continues to endorse depressed mood with intermittent crying when assessed, and with no thoughts or plan of SI/HI.  -Unable to report to [facility] as patient has no current 's license and admitted to not being a [place] citizen.  -Psychiatry extended legal hold and will reconsult psychiatry to reevaluate patient in a.m. as she still is on legal hold.    Disposition  To be determined on legal hold.  Emergency Medicaid application being submitted in order to help with payment of antiepileptic medications and placement.    Review of Systems  Review of Systems   Reason unable to perform ROS: Continues to be inconsistent at times and unreliable being treated currently by psychiatry.        Physical Exam  Temp:  [36.7 °C (98 °F)-37.1 °C (98.7 °F)] 36.9 °C (98.4 °F)  Pulse:  [67-82] 82  Resp:  [16-18] 18  BP: (102-126)/(66-82) 102/69    Physical Exam   Constitutional: She is oriented to person, place, and time. She appears well-developed. No distress.   Flat affect and tearful  Belgian-speaking only     HENT:   Head: Normocephalic and atraumatic.   Eyes: Pupils are equal, round, and reactive to light. Conjunctivae and EOM are normal.   Neck: Normal range of motion. Neck supple.   Cardiovascular: Normal rate, normal heart sounds and intact distal pulses.  Exam reveals no gallop.    Pulmonary/Chest: Effort normal. No respiratory distress. She has no wheezes.   Abdominal: Soft. She exhibits no distension. There is no tenderness.    Musculoskeletal: Normal range of motion.   Neurological: She is alert and oriented to person, place, and time. She has normal strength. No cranial nerve deficit. Coordination and gait normal.   Skin: Skin is warm and dry.   Healing abrasions to the left side of face   Psychiatric: Her mood appears not anxious. Her affect is not labile. She is withdrawn. Thought content is not paranoid and not delusional. She does not express impulsivity or inappropriate judgment. She exhibits a depressed mood. She expresses no homicidal and no suicidal ideation. She expresses no suicidal plans and no homicidal plans. She exhibits abnormal remote memory. She exhibits normal recent memory.   Depressed mood and tearful       Fluids    Intake/Output Summary (Last 24 hours) at 09/23/18 1814  Last data filed at 09/22/18 2100   Gross per 24 hour   Intake              120 ml   Output                0 ml   Net              120 ml       Laboratory  Recent Labs      09/21/18   0356   WBC  4.6*   RBC  4.43   HEMOGLOBIN  13.1   HEMATOCRIT  39.8   MCV  89.8   MCH  29.6   MCHC  32.9*   RDW  41.5   PLATELETCT  152*   MPV  10.0     Recent Labs      09/21/18   0356   SODIUM  141   POTASSIUM  4.3   CHLORIDE  108   CO2  26   GLUCOSE  109*   BUN  16   CREATININE  0.61   CALCIUM  8.5               Assessment/Plan  Abnormal EKG   Assessment & Plan    Patient with no current chest pain or any complaints of cardiac issues    -EKG with upsloping ST segments on EKG which were read by the machine as acute MI  -Patient has no symptoms of acute MI, I read the EKG as artifact and early repolarization  -Troponins negative  -Will monitor        Suicidal ideations   Assessment & Plan    Patient today continues to endorse depressed mood with intermittent crying when assessed, and continues to have no thoughts or plan of SI/HI.    -Will have psychiatry reassess in a.m.  -Continue Geodon 20mg PO BID   -Discontinue PRN phenergan and compazine for possible reaction  with Geodon if used   -Legal status: extended and will have psych f/u in a.m.  -Continue SI precautions        Seizure disorder (HCC)- (present on admission)   Assessment & Plan    No current seizures over past 24 hours.      -Plan as stated previous        Acute encephalopathy- (present on admission)   Assessment & Plan    Patient unreliable historian secondary to language barrier.  Questionable if patient is consistent with taking antiepileptic medications due to financial concerns expressed repeatedly by patient.     -Continues to be more lucid and able to hold a conversation and answer questions appropriately, however continues intermittently confused more so with time, place, and situation. No seizure activity noted over the last 24 hours.  No fevers or leukocytosis or indication of infectious process.  -Continue Keppra per neurology and Carbamazepine being tapered off  -Carbamazepine level unremarkable at 8  -Continue on Ativan PRN and Geodon  -Unable to report to [employer] as patient has no current 's license and admitted to not being a [place] citizen.  -Psychiatry following patient and will contact to have them follow-up with legal hold          DVT:  Lovenox/SCDs      HOLLAND Worthington

## 2018-09-24 NOTE — PSYCHIATRY
"PSYCHIATRIC Follow up:  Reason for admission:     Reason for consult:suicidal   Requesting Physician:  Supervising Physician:Unique Carranza MD         Legal status:  extended    Chief Complaint:\" I have seizures\"    HPI: 33 year old female with history of seizure disorder presented to hospital after experiencing multiple seizures. Patient also fell and struck the right side of her head. CT head negative. Patient was reportedly disoriented and agitated following this episode. While in the hospital, pt expressed suicidal ideation to a  and was placed on a legal hold.     Using the  ipad, we discussed her current situation. She states she no longer has suicidal thoughts and just wants to go home and take care of her children. She is still sad about her current situation. When asked to describe her situation she states that she is having a hard time because the seizure disorder has taken her ability to drive away and that she is the sole parent of 3 children and she needs to have a way to get the kids to school. She also states her  works out of state and that her friends wont speak to her anymore. She was crying through the assessment and continued to state that she just wanted to go home to care for her children.       Psychiatric Review of Systems: Current symptoms as reported by pt.  Depression: Mood only       Antonietta: Denies  Anxiety/Panic Attacks States she is anxious about her current situation.   PTSD symptom: Denies   Psychosis: Denies  Other:       Medical Review of Systems: as reported by pt. All systems reviewed. Only those found to be + are noted below. All others are negative.   Neurological:    TBIs: Denies   SZs: Chronic seizure disorder   Strokes: Denies   Other:  Other medical symptoms:   Thyroid: Denies   Diabetes:Denies   Cardiovascular disease:Denies    Psychiatric Examination: observed phenomenon:  Vitals:  36.6 (97.8)        Pulse    77    Respiration    " 16    Blood Pressure    116/69    Pulse Oximetry (%)    97      Appearance: Well groomed, hospital gown,  female  Muscle Strength/Tone: Normal  Gait/Station: Unable to assess  Speech: Regular rate, rhythm, tone  Thought Process: Goal directed  Associations: No looses associations  Abnormal/Psychotic Thoughts (ex): No delusions or hallucinations  Insight/Judgement: poor/ppor  Orientation: Alert and orientated times 4  Memory: Intact   Attention/Concentration: Intact  Language: Bengali/English  Fund of Knowledge: Adequate  Mood: Depressed           Affect: Congruent with mood          SI/HI:   Denies     Past Psychiatric Hx: None    Family Psychiatric Hx: Denies    Social Hx:  Has 3 children  ,  works out of state  Unable to work or drive due to seizure disorder    Drug/Alcohol/Tobacco Hx:   Drugs: Denies    Alcohol: Denies    Tobacco: Denies    Medical Hx: labs, MARS, medications, etc were reviewed. Only those findings of potential interest to psychiatry are noted below:   Medical Conditions:  Chronic Seizure Disorder    Allergies: NKDA  Medications (currently prescribed at [facility]):  Current Facility-Administered Medications:   •  ziprasidone (GEODON) capsule 20 mg, 20 mg, Oral, BID, Unique Coleman M.D., 20 mg at 09/20/18 1106  •  senna-docusate (PERICOLACE or SENOKOT S) 8.6-50 MG per tablet 2 Tab, 2 Tab, Oral, BID, 2 Tab at 09/20/18 0534 **AND** polyethylene glycol/lytes (MIRALAX) PACKET 1 Packet, 1 Packet, Oral, QDAY PRN **AND** magnesium hydroxide (MILK OF MAGNESIA) suspension 30 mL, 30 mL, Oral, QDAY PRN **AND** bisacodyl (DULCOLAX) suppository 10 mg, 10 mg, Rectal, QDAY PRN, Jimmy Birch M.D.  •  enoxaparin (LOVENOX) inj 40 mg, 40 mg, Subcutaneous, DAILY, Jimmy Birch M.D., 40 mg at 09/20/18 0534  •  acetaminophen (TYLENOL) tablet 650 mg, 650 mg, Oral, Q6HRS PRN, Jimmy Birch M.D., 650 mg at 09/20/18 1016  •  ondansetron (ZOFRAN) syringe/vial injection 4 mg, 4 mg, Intravenous,  Q4HRS PRN, Jimmy Birch M.D.  •  ondansetron (ZOFRAN ODT) dispertab 4 mg, 4 mg, Oral, Q4HRS PRN, Jimmy Birch M.D.  •  promethazine (PHENERGAN) tablet 12.5-25 mg, 12.5-25 mg, Oral, Q4HRS PRN, Jimmy Birch M.D.  •  promethazine (PHENERGAN) suppository 12.5-25 mg, 12.5-25 mg, Rectal, Q4HRS PRN, Jimmy Birch M.D.  •  prochlorperazine (COMPAZINE) injection 5-10 mg, 5-10 mg, Intravenous, Q4HRS PRN, Jimmy Birch M.D.  •  LORazepam (ATIVAN) injection 4 mg, 4 mg, Intravenous, Q10 MIN PRN, Jimmy Birch M.D.  •  carBAMazepine (TEGRETOL) tablet 200 mg, 200 mg, Oral, BID, Jimmy Birch M.D., 200 mg at 09/20/18 0535  •  ibuprofen (MOTRIN) tablet 200 mg, 200 mg, Oral, Q6HRS PRN, Jimmy Birch M.D., 200 mg at 09/18/18 1556  •  LORazepam (ATIVAN) injection 0.5-1 mg, 0.5-1 mg, Intravenous, Q6HRS PRN, Jimmy Birch M.D.  •  levETIRAcetam (KEPPRA) 750 mg in  mL IVPB, 750 mg, Intravenous, Q12HRS, Isidro Ness M.D., Stopped at 09/20/18 0549   Labs:  Results for LEENA NEELY (MRN [id number]) as of 9/24/2018 13:02   Ref. Range 9/21/2018 03:56   WBC Latest Ref Range: 4.8 - 10.8 K/uL 4.6 (L)   RBC Latest Ref Range: 4.20 - 5.40 M/uL 4.43   Hemoglobin Latest Ref Range: 12.0 - 16.0 g/dL 13.1   Hematocrit Latest Ref Range: 37.0 - 47.0 % 39.8   MCV Latest Ref Range: 81.4 - 97.8 fL 89.8   MCH Latest Ref Range: 27.0 - 33.0 pg 29.6   MCHC Latest Ref Range: 33.6 - 35.0 g/dL 32.9 (L)   RDW Latest Ref Range: 35.9 - 50.0 fL 41.5   Platelet Count Latest Ref Range: 164 - 446 K/uL 152 (L)   MPV Latest Ref Range: 9.0 - 12.9 fL 10.0   Neutrophils-Polys Latest Ref Range: 44.00 - 72.00 % 57.50   Neutrophils (Absolute) Latest Ref Range: 2.00 - 7.15 K/uL 2.62   Lymphocytes Latest Ref Range: 22.00 - 41.00 % 35.50   Lymphs (Absolute) Latest Ref Range: 1.00 - 4.80 K/uL 1.62   Monocytes Latest Ref Range: 0.00 - 13.40 % 6.40   Monos (Absolute) Latest Ref Range: 0.00 - 0.85 K/uL 0.29   Eosinophils Latest Ref Range: 0.00 - 6.90 % 0.20   Eos (Absolute) Latest Ref  Range: 0.00 - 0.51 K/uL 0.01   Basophils Latest Ref Range: 0.00 - 1.80 % 0.20   Baso (Absolute) Latest Ref Range: 0.00 - 0.12 K/uL 0.01   Immature Granulocytes Latest Ref Range: 0.00 - 0.90 % 0.20   Immature Granulocytes (abs) Latest Ref Range: 0.00 - 0.11 K/uL 0.01   Nucleated RBC Latest Units: /100 WBC 0.00   NRBC (Absolute) Latest Units: K/uL 0.00   Sodium Latest Ref Range: 135 - 145 mmol/L 141   Potassium Latest Ref Range: 3.6 - 5.5 mmol/L 4.3   Chloride Latest Ref Range: 96 - 112 mmol/L 108   Co2 Latest Ref Range: 20 - 33 mmol/L 26   Anion Gap Latest Ref Range: 0.0 - 11.9  7.0   Glucose Latest Ref Range: 65 - 99 mg/dL 109 (H)   Bun Latest Ref Range: 8 - 22 mg/dL 16   Creatinine Latest Ref Range: 0.50 - 1.40 mg/dL 0.61   GFR If  Latest Ref Range: >60 mL/min/1.73 m 2 >60   GFR If Non  Latest Ref Range: >60 mL/min/1.73 m 2 >60   Calcium Latest Ref Range: 8.5 - 10.5 mg/dL 8.5   AST(SGOT) Latest Ref Range: 12 - 45 U/L 8 (L)   ALT(SGPT) Latest Ref Range: 2 - 50 U/L 7   Alkaline Phosphatase Latest Ref Range: 30 - 99 U/L 58   Total Bilirubin Latest Ref Range: 0.1 - 1.5 mg/dL 0.3   Albumin Latest Ref Range: 3.2 - 4.9 g/dL 3.5   Total Protein Latest Ref Range: 6.0 - 8.2 g/dL 5.8 (L)   Globulin Latest Ref Range: 1.9 - 3.5 g/dL 2.3   A-G Ratio Latest Units: g/dL 1.5   Magnesium Latest Ref Range: 1.5 - 2.5 mg/dL 2.1     ECG: QTc-396    Cranial Imaging: reviewed  Other:      ASSESSMENT:   Adjustment disorder with depressed mood      PLAN:  Legal status: extended  Anticipate F/U within 24 hours.   Records reviewed: Yes  Eligible for transfer to [place] [address] (ED only): NO   Discussed patient with other provider: Yes  Will follow    JORY Henderson

## 2018-09-24 NOTE — PROGRESS NOTES
Assumed pt care this evening. Pt is A/Ox4 (Faroese speaking only,  used). VSS. C/o headache, PRN pain medication given. Scheduled medications given. Hourly rounding in place, will continue to monitor.

## 2018-09-24 NOTE — DISCHARGE PLANNING
Agency/Facility Name: [facility]   Spoke To: Tuyet  Outcome: Patient declined due to no insurance.    ELSY Gutierrez informed

## 2018-09-25 PROBLEM — R94.31 ABNORMAL EKG: Status: RESOLVED | Noted: 2018-09-19 | Resolved: 2018-09-25

## 2018-09-25 LAB
ANION GAP SERPL CALC-SCNC: 8 MMOL/L (ref 0–11.9)
BASOPHILS # BLD AUTO: 0 % (ref 0–1.8)
BASOPHILS # BLD: 0 K/UL (ref 0–0.12)
BUN SERPL-MCNC: 13 MG/DL (ref 8–22)
CALCIUM SERPL-MCNC: 9.1 MG/DL (ref 8.5–10.5)
CHLORIDE SERPL-SCNC: 104 MMOL/L (ref 96–112)
CO2 SERPL-SCNC: 26 MMOL/L (ref 20–33)
CREAT SERPL-MCNC: 0.71 MG/DL (ref 0.5–1.4)
EOSINOPHIL # BLD AUTO: 0 K/UL (ref 0–0.51)
EOSINOPHIL NFR BLD: 0 % (ref 0–6.9)
ERYTHROCYTE [DISTWIDTH] IN BLOOD BY AUTOMATED COUNT: 39.1 FL (ref 35.9–50)
GLUCOSE SERPL-MCNC: 99 MG/DL (ref 65–99)
HCT VFR BLD AUTO: 41.3 % (ref 37–47)
HGB BLD-MCNC: 14 G/DL (ref 12–16)
IMM GRANULOCYTES # BLD AUTO: 0.01 K/UL (ref 0–0.11)
IMM GRANULOCYTES NFR BLD AUTO: 0.3 % (ref 0–0.9)
LYMPHOCYTES # BLD AUTO: 1.43 K/UL (ref 1–4.8)
LYMPHOCYTES NFR BLD: 42.6 % (ref 22–41)
MCH RBC QN AUTO: 29.6 PG (ref 27–33)
MCHC RBC AUTO-ENTMCNC: 33.9 G/DL (ref 33.6–35)
MCV RBC AUTO: 87.3 FL (ref 81.4–97.8)
MONOCYTES # BLD AUTO: 0.24 K/UL (ref 0–0.85)
MONOCYTES NFR BLD AUTO: 7.1 % (ref 0–13.4)
NEUTROPHILS # BLD AUTO: 1.68 K/UL (ref 2–7.15)
NEUTROPHILS NFR BLD: 50 % (ref 44–72)
NRBC # BLD AUTO: 0 K/UL
NRBC BLD-RTO: 0 /100 WBC
PLATELET # BLD AUTO: 144 K/UL (ref 164–446)
PMV BLD AUTO: 9.7 FL (ref 9–12.9)
POTASSIUM SERPL-SCNC: 4.2 MMOL/L (ref 3.6–5.5)
RBC # BLD AUTO: 4.73 M/UL (ref 4.2–5.4)
SODIUM SERPL-SCNC: 138 MMOL/L (ref 135–145)
WBC # BLD AUTO: 3.4 K/UL (ref 4.8–10.8)

## 2018-09-25 PROCEDURE — A9270 NON-COVERED ITEM OR SERVICE: HCPCS | Performed by: INTERNAL MEDICINE

## 2018-09-25 PROCEDURE — A9270 NON-COVERED ITEM OR SERVICE: HCPCS | Performed by: NURSE PRACTITIONER

## 2018-09-25 PROCEDURE — 85025 COMPLETE CBC W/AUTO DIFF WBC: CPT

## 2018-09-25 PROCEDURE — 80048 BASIC METABOLIC PNL TOTAL CA: CPT

## 2018-09-25 PROCEDURE — 99232 SBSQ HOSP IP/OBS MODERATE 35: CPT | Performed by: HOSPITALIST

## 2018-09-25 PROCEDURE — 770001 HCHG ROOM/CARE - MED/SURG/GYN PRIV*

## 2018-09-25 PROCEDURE — 700102 HCHG RX REV CODE 250 W/ 637 OVERRIDE(OP): Performed by: INTERNAL MEDICINE

## 2018-09-25 PROCEDURE — A9270 NON-COVERED ITEM OR SERVICE: HCPCS | Performed by: HOSPITALIST

## 2018-09-25 PROCEDURE — 36415 COLL VENOUS BLD VENIPUNCTURE: CPT

## 2018-09-25 PROCEDURE — 700102 HCHG RX REV CODE 250 W/ 637 OVERRIDE(OP): Performed by: HOSPITALIST

## 2018-09-25 PROCEDURE — 700102 HCHG RX REV CODE 250 W/ 637 OVERRIDE(OP): Performed by: NURSE PRACTITIONER

## 2018-09-25 PROCEDURE — 700111 HCHG RX REV CODE 636 W/ 250 OVERRIDE (IP): Performed by: INTERNAL MEDICINE

## 2018-09-25 RX ADMIN — LEVETIRACETAM 750 MG: 250 TABLET ORAL at 17:48

## 2018-09-25 RX ADMIN — IBUPROFEN 200 MG: 200 TABLET, FILM COATED ORAL at 21:34

## 2018-09-25 RX ADMIN — STANDARDIZED SENNA CONCENTRATE AND DOCUSATE SODIUM 2 TABLET: 8.6; 5 TABLET ORAL at 17:46

## 2018-09-25 RX ADMIN — ZIPRASIDONE HCL 40 MG: 40 CAPSULE ORAL at 04:09

## 2018-09-25 RX ADMIN — ZIPRASIDONE HCL 40 MG: 40 CAPSULE ORAL at 17:47

## 2018-09-25 RX ADMIN — CARBAMAZEPINE 200 MG: 200 TABLET ORAL at 04:09

## 2018-09-25 RX ADMIN — ENOXAPARIN SODIUM 40 MG: 40 INJECTION, SOLUTION INTRAVENOUS; SUBCUTANEOUS at 04:09

## 2018-09-25 RX ADMIN — LEVETIRACETAM 750 MG: 250 TABLET ORAL at 04:09

## 2018-09-25 RX ADMIN — ACETAMINOPHEN 650 MG: 325 TABLET, FILM COATED ORAL at 17:50

## 2018-09-25 ASSESSMENT — ENCOUNTER SYMPTOMS
DOUBLE VISION: 0
WEIGHT LOSS: 0
VOMITING: 0
BLOOD IN STOOL: 0
CHILLS: 0
HEARTBURN: 0
FOCAL WEAKNESS: 0
BRUISES/BLEEDS EASILY: 0
ABDOMINAL PAIN: 0
FEVER: 0
SHORTNESS OF BREATH: 0
HEADACHES: 0
PND: 0
BLURRED VISION: 0
DEPRESSION: 0
PALPITATIONS: 0
ORTHOPNEA: 0
MYALGIAS: 0
NAUSEA: 0
SEIZURES: 0
SENSORY CHANGE: 0
DIAPHORESIS: 0
PHOTOPHOBIA: 0
HEMOPTYSIS: 0
DIARRHEA: 0
DIZZINESS: 0
WHEEZING: 0
SORE THROAT: 0
COUGH: 0
STRIDOR: 0

## 2018-09-25 ASSESSMENT — PAIN SCALES - GENERAL
PAINLEVEL_OUTOF10: 3
PAINLEVEL_OUTOF10: 0

## 2018-09-25 NOTE — DISCHARGE PLANNING
[facility]  Spoke To: Unique  Outcome: Referral pending and no beds available.    Agency/Facility Name: [facility]  Spoke To: Florencia  Outcome: Referral pending and no beds available.    ELSY Gutierrez informed.

## 2018-09-25 NOTE — CARE PLAN
Problem: Venous Thromboembolism (VTW)/Deep Vein Thrombosis (DVT) Prevention:  Goal: Patient will participate in Venous Thrombosis (VTE)/Deep Vein Thrombosis (DVT)Prevention Measures    Intervention: Encourage ambulation/mobilization at level directed by Physical Therapy in collaboration with Interdisciplinary Team  Ambulated patient around perimeter of Neuroscience and Neurosurgery department.      Problem: Psychosocial Needs:  Goal: Level of anxiety will decrease    Intervention: Identify and develop with patient strategies to cope with anxiety triggers  Comfort foods/snacks/drinks provided. Patient grateful.

## 2018-09-25 NOTE — PROGRESS NOTES
Name:  Jeferson   ID Number:  [id number]     Content Discussed:  Senna, Levetiracetam, and Ziprasidone, medication education.

## 2018-09-25 NOTE — PROGRESS NOTES
Assumed pt care this evening. Pt is A/Ox4 (Telugu speaking only), VSS. C/o headache and menstrual cramps, PRN Tylenol medication given. Denies N/V/D. Scheduled medications given. Hourly rounding in place, will continue to monitor.

## 2018-09-25 NOTE — PROGRESS NOTES
[facility] Medicine Daily Progress Note    Date of Service  9/24/2018    Chief Complaint  33 y.o. female admitted 9/18/2018 with seizure and altered mental status    Interval Problem Update  -Continues to be more lucid and able to hold a conversation and answer questions appropriately, however continues with depressed mood and intermittent crying throughout the day. Today continues to be alert and oriented ×3.  No seizure activity over the last 24 hours.   -Patient today continues to endorse depressed mood with intermittent crying when assessed, and with no thoughts or plan of SI/HI.  -Unable to report to [facility] as patient has no current 's license and admitted to not being a [place] citizen.  -Psychiatry extended legal hold and pending psychiatry reevaluation.    Disposition  To be determined on legal hold.  Emergency Medicaid application being submitted in order to help with payment of antiepileptic medications and placement.    Review of Systems  Review of Systems   Reason unable to perform ROS: Continues to be inconsistent at times and unreliable being treated currently by psychiatry.        Physical Exam  Temp:  [36.6 °C (97.8 °F)-36.9 °C (98.5 °F)] 36.8 °C (98.3 °F)  Pulse:  [68-82] 82  Resp:  [16] 16  BP: (107-116)/(61-71) 107/61    Physical Exam   Constitutional: She is oriented to person, place, and time. She appears well-developed. No distress.   Flat affect and tearful  Dominican-speaking only     HENT:   Head: Normocephalic and atraumatic.   Eyes: Pupils are equal, round, and reactive to light. Conjunctivae and EOM are normal.   Neck: Normal range of motion. Neck supple.   Cardiovascular: Normal rate, normal heart sounds and intact distal pulses.  Exam reveals no gallop.    Pulmonary/Chest: Effort normal. No respiratory distress. She has no wheezes.   Abdominal: Soft. She exhibits no distension. There is no tenderness.   Musculoskeletal: Normal range of motion.   Neurological: She is alert and oriented to  person, place, and time. She has normal strength. No cranial nerve deficit. Coordination and gait normal.   Skin: Skin is warm and dry.   Psychiatric: Her mood appears not anxious. Her affect is not labile. She is withdrawn. Thought content is not paranoid and not delusional. She does not express impulsivity or inappropriate judgment. She exhibits a depressed mood. She expresses no homicidal and no suicidal ideation. She expresses no suicidal plans and no homicidal plans. She exhibits abnormal remote memory. She exhibits normal recent memory.   Depressed mood and tearful       Fluids    Intake/Output Summary (Last 24 hours) at 09/24/18 1808  Last data filed at 09/24/18 1200   Gross per 24 hour   Intake              120 ml   Output                0 ml   Net              120 ml         Assessment/Plan  Abnormal EKG   Assessment & Plan    Patient with no current chest pain or any complaints of cardiac issues    -EKG with upsloping ST segments on EKG which were read by the machine as acute MI  -Patient has no symptoms of acute MI, I read the EKG as artifact and early repolarization  -Troponins negative  -Will monitor        Suicidal ideations   Assessment & Plan    Patient today continues to endorse depressed mood with intermittent crying when assessed, and continues to have no thoughts or plan of SI/HI.    -Psychiatry followed up on patient today and recommendations to continue legal hold  -Continue Geodon 20mg PO BID   -Discontinue PRN phenergan and compazine for possible reaction with Geodon if used .  -Continue SI precautions        Seizure disorder (HCC)- (present on admission)   Assessment & Plan    No current seizures over past 24 hours.      -Plan as stated previous        Acute encephalopathy- (present on admission)   Assessment & Plan    Patient unreliable historian secondary to language barrier.  Questionable if patient is consistent with taking antiepileptic medications due to financial concerns expressed  repeatedly by patient.     -Continues to be more lucid and able to hold a conversation and answer questions appropriately, however continues with depressed mood and intermittent crying throughout the day.  Patient today alert and oriented ×3. No seizure activity noted over the last 24 hours.  No fevers or indication of infectious process.  -Continue Keppra per neurology and Carbamazepine being tapered off  -Carbamazepine level unremarkable at 8  -Continue on Ativan PRN and Geodon  -Unable to report to [facility] as patient has no current 's license and admitted to not being a [place] citizen.  -Psychiatry followed up on patient today and recommendations to continue legal hold          DVT:  Lovenox/SCDs      LILIBETH Worthington.

## 2018-09-25 NOTE — PROGRESS NOTES
Legal Hold continued by MANE Mueller in rounds. Patient psychosocially stable at this time with no needs. Activity and comfort foods provided. Patient verbalizing gratitude.  Services utilized.

## 2018-09-25 NOTE — PROGRESS NOTES
[facility] Hospitalist Progress Note    Date of Service: 2018    Chief Complaint  33 y.o. female admitted 2018 with seizure disorder and suicidal ideation. Patient was treated with the addition of Catapres to her Tegretol with cessation of her seizures while inpatient. She expressed brief suicidal ideation to a staff member at the psychiatry was consulted regarding her care    Interval Problem Update  Seizures--controlled on Tegretol and Keppra.  SI--currently denies suicidal ideation. Says she has been depressed due to her seizure disorder, 's recent injury, and limited ability to care for her children.    Consultants/Specialty  Psychiatry    Disposition  Home versus inpatient psych -- continues on legal hold per psych        Review of Systems   Constitutional: Negative for chills, diaphoresis, fever and weight loss.   HENT: Negative for ear pain, sore throat and tinnitus.    Eyes: Negative for blurred vision, double vision and photophobia.   Respiratory: Negative for cough, hemoptysis, shortness of breath, wheezing and stridor.    Cardiovascular: Negative for chest pain, palpitations, orthopnea and PND.   Gastrointestinal: Negative for abdominal pain, blood in stool, diarrhea, heartburn, melena, nausea and vomiting.   Genitourinary: Negative for dysuria, hematuria and urgency.   Musculoskeletal: Negative for joint pain and myalgias.   Neurological: Negative for dizziness, sensory change, focal weakness, seizures and headaches.   Endo/Heme/Allergies: Does not bruise/bleed easily.   Psychiatric/Behavioral: Negative for depression and suicidal ideas.      Physical Exam  Laboratory/Imaging   Hemodynamics  Temp (24hrs), Av.6 °C (97.9 °F), Min:36.4 °C (97.6 °F), Max:36.8 °C (98.3 °F)   Temperature: 36.4 °C (97.6 °F)  Pulse  Av.2  Min: 61  Max: 96    Blood Pressure: 104/60      Respiratory      Respiration: 16, Pulse Oximetry: 98 %             Fluids  No intake or output data in the 24 hours ending  09/25/18 1412    Nutrition  Orders Placed This Encounter   Procedures   • Diet Order Regular     Standing Status:   Standing     Number of Occurrences:   1     Order Specific Question:   Diet:     Answer:   Regular [1]     Physical Exam   Constitutional: She is oriented to person, place, and time. She appears well-developed and well-nourished. No distress.   HENT:   Head: Normocephalic and atraumatic.   Eyes: Pupils are equal, round, and reactive to light. EOM are normal.   Neck: Neck supple.   Cardiovascular: Normal rate, regular rhythm and normal heart sounds.    Pulmonary/Chest: Effort normal and breath sounds normal. No respiratory distress. She has no wheezes. She has no rales.   Abdominal: Soft. She exhibits no distension. There is no tenderness.   Neurological: She is alert and oriented to person, place, and time. No cranial nerve deficit.   Skin: Skin is warm and dry.       Recent Labs      09/25/18   0655   WBC  3.4*   RBC  4.73   HEMOGLOBIN  14.0   HEMATOCRIT  41.3   MCV  87.3   MCH  29.6   MCHC  33.9   RDW  39.1   PLATELETCT  144*   MPV  9.7     Recent Labs      09/25/18   0655   SODIUM  138   POTASSIUM  4.2   CHLORIDE  104   CO2  26   GLUCOSE  99   BUN  13   CREATININE  0.71   CALCIUM  9.1                      Assessment/Plan     Suicidal ideations   Assessment & Plan    Patient stable.  Denies SI or HI via . Wants to get home to her children.  Continue Geodon 20mg PO BID   Currently on legal hold  Psych following        Seizure disorder (HCC)- (present on admission)   Assessment & Plan    No seizure activity noted over the last 48 hours.    Continue Keppra per neurology and Carbamazepine being tapered off  Keppra at current dosing is $116/month at [facility] for cash price          Quality-Core Measures   Reviewed items::  Labs reviewed and Medications reviewed  Sandhu catheter::  No Sandhu  DVT prophylaxis pharmacological::  Enoxaparin (Lovenox)

## 2018-09-26 PROBLEM — F33.1 DEPRESSION, MAJOR, RECURRENT, MODERATE (HCC): Status: ACTIVE | Noted: 2018-09-26

## 2018-09-26 LAB — TSH SERPL DL<=0.005 MIU/L-ACNC: 0.88 UIU/ML (ref 0.38–5.33)

## 2018-09-26 PROCEDURE — 36415 COLL VENOUS BLD VENIPUNCTURE: CPT

## 2018-09-26 PROCEDURE — 99232 SBSQ HOSP IP/OBS MODERATE 35: CPT | Performed by: HOSPITALIST

## 2018-09-26 PROCEDURE — A9270 NON-COVERED ITEM OR SERVICE: HCPCS | Performed by: INTERNAL MEDICINE

## 2018-09-26 PROCEDURE — 700102 HCHG RX REV CODE 250 W/ 637 OVERRIDE(OP): Performed by: INTERNAL MEDICINE

## 2018-09-26 PROCEDURE — 770001 HCHG ROOM/CARE - MED/SURG/GYN PRIV*

## 2018-09-26 PROCEDURE — A9270 NON-COVERED ITEM OR SERVICE: HCPCS | Performed by: PSYCHIATRY & NEUROLOGY

## 2018-09-26 PROCEDURE — 99232 SBSQ HOSP IP/OBS MODERATE 35: CPT | Performed by: PSYCHIATRY & NEUROLOGY

## 2018-09-26 PROCEDURE — A9270 NON-COVERED ITEM OR SERVICE: HCPCS | Performed by: NURSE PRACTITIONER

## 2018-09-26 PROCEDURE — 700102 HCHG RX REV CODE 250 W/ 637 OVERRIDE(OP): Performed by: HOSPITALIST

## 2018-09-26 PROCEDURE — 700102 HCHG RX REV CODE 250 W/ 637 OVERRIDE(OP): Performed by: NURSE PRACTITIONER

## 2018-09-26 PROCEDURE — A9270 NON-COVERED ITEM OR SERVICE: HCPCS | Performed by: HOSPITALIST

## 2018-09-26 PROCEDURE — 84443 ASSAY THYROID STIM HORMONE: CPT

## 2018-09-26 PROCEDURE — 700111 HCHG RX REV CODE 636 W/ 250 OVERRIDE (IP): Performed by: INTERNAL MEDICINE

## 2018-09-26 PROCEDURE — 700102 HCHG RX REV CODE 250 W/ 637 OVERRIDE(OP): Performed by: PSYCHIATRY & NEUROLOGY

## 2018-09-26 RX ORDER — ESCITALOPRAM OXALATE 10 MG/1
10 TABLET ORAL DAILY
Status: DISCONTINUED | OUTPATIENT
Start: 2018-09-26 | End: 2018-09-27

## 2018-09-26 RX ORDER — CLONAZEPAM 0.5 MG/1
0.5 TABLET ORAL 2 TIMES DAILY
Status: DISCONTINUED | OUTPATIENT
Start: 2018-09-26 | End: 2018-09-28 | Stop reason: HOSPADM

## 2018-09-26 RX ADMIN — IBUPROFEN 200 MG: 200 TABLET, FILM COATED ORAL at 20:23

## 2018-09-26 RX ADMIN — LEVETIRACETAM 750 MG: 250 TABLET ORAL at 06:09

## 2018-09-26 RX ADMIN — ESCITALOPRAM OXALATE 10 MG: 10 TABLET ORAL at 15:04

## 2018-09-26 RX ADMIN — ZIPRASIDONE HCL 40 MG: 40 CAPSULE ORAL at 06:09

## 2018-09-26 RX ADMIN — ENOXAPARIN SODIUM 40 MG: 40 INJECTION, SOLUTION INTRAVENOUS; SUBCUTANEOUS at 06:09

## 2018-09-26 RX ADMIN — CLONAZEPAM 0.5 MG: 0.5 TABLET ORAL at 17:12

## 2018-09-26 RX ADMIN — CARBAMAZEPINE 100 MG: 100 TABLET, CHEWABLE ORAL at 06:09

## 2018-09-26 RX ADMIN — LEVETIRACETAM 750 MG: 250 TABLET ORAL at 17:12

## 2018-09-26 ASSESSMENT — ENCOUNTER SYMPTOMS
SEIZURES: 0
COUGH: 0
SORE THROAT: 0
WEIGHT LOSS: 0
ORTHOPNEA: 0
ABDOMINAL PAIN: 0
DIARRHEA: 0
BRUISES/BLEEDS EASILY: 0
SHORTNESS OF BREATH: 0
STRIDOR: 0
HEARTBURN: 0
WHEEZING: 0
CHILLS: 0
DIAPHORESIS: 0
DOUBLE VISION: 0
FOCAL WEAKNESS: 0
HEADACHES: 0
DIZZINESS: 0
PND: 0
PALPITATIONS: 0
DEPRESSION: 0
VOMITING: 0
HEMOPTYSIS: 0
SENSORY CHANGE: 0
PHOTOPHOBIA: 0
BLOOD IN STOOL: 0
MYALGIAS: 0
FEVER: 0
NAUSEA: 0
BLURRED VISION: 0

## 2018-09-26 ASSESSMENT — PAIN SCALES - GENERAL
PAINLEVEL_OUTOF10: 0
PAINLEVEL_OUTOF10: 0
PAINLEVEL_OUTOF10: 2
PAINLEVEL_OUTOF10: 0

## 2018-09-26 NOTE — DISCHARGE PLANNING
Agency/Facility Name: [facility]  Spoke To: Dolly  Outcome: Referral pending bed availability.

## 2018-09-26 NOTE — PROGRESS NOTES
[facility] Hospitalist Progress Note    Date of Service: 2018    Chief Complaint  33 y.o. female admitted 2018 with seizure disorder and suicidal ideation. Patient was treated with the addition of Keppra to her Tegretol with cessation of her seizures while inpatient. MRI shows mesial temporal sclerosis. She expressed brief suicidal ideation to a staff member at the psychiatry was consulted regarding her care    Interval Problem Update  Seizures--controlled on Tegretol and Keppra.  SI--currently denies suicidal ideation. Says she has been depressed due to her seizure disorder, 's recent injury, and limited ability to care for her children. Psych adjusting her medications and following    Consultants/Specialty  Psychiatry    Disposition  Home versus inpatient psych -- continues on legal hold         Review of Systems   Constitutional: Negative for chills, diaphoresis, fever and weight loss.   HENT: Negative for ear pain, sore throat and tinnitus.    Eyes: Negative for blurred vision, double vision and photophobia.   Respiratory: Negative for cough, hemoptysis, shortness of breath, wheezing and stridor.    Cardiovascular: Negative for chest pain, palpitations, orthopnea and PND.   Gastrointestinal: Negative for abdominal pain, blood in stool, diarrhea, heartburn, melena, nausea and vomiting.   Genitourinary: Negative for dysuria, hematuria and urgency.   Musculoskeletal: Negative for joint pain and myalgias.   Neurological: Negative for dizziness, sensory change, focal weakness, seizures and headaches.   Endo/Heme/Allergies: Does not bruise/bleed easily.   Psychiatric/Behavioral: Negative for depression and suicidal ideas.      Physical Exam  Laboratory/Imaging   Hemodynamics  Temp (24hrs), Av.6 °C (97.9 °F), Min:36.4 °C (97.5 °F), Max:36.9 °C (98.4 °F)   Temperature: 36.9 °C (98.4 °F)  Pulse  Av.4  Min: 61  Max: 96    Blood Pressure: 109/70      Respiratory      Respiration: 12, Pulse Oximetry:  98 %             Fluids    Intake/Output Summary (Last 24 hours) at 09/26/18 1520  Last data filed at 09/26/18 1500   Gross per 24 hour   Intake             1110 ml   Output                0 ml   Net             1110 ml       Nutrition  Orders Placed This Encounter   Procedures   • Diet Order Regular     Standing Status:   Standing     Number of Occurrences:   1     Order Specific Question:   Diet:     Answer:   Regular [1]     Physical Exam   Constitutional: She is oriented to person, place, and time. She appears well-developed and well-nourished. No distress.   HENT:   Head: Normocephalic and atraumatic.   Eyes: Pupils are equal, round, and reactive to light. EOM are normal.   Neck: Neck supple.   Cardiovascular: Normal rate, regular rhythm and normal heart sounds.    Pulmonary/Chest: Effort normal and breath sounds normal. No respiratory distress. She has no wheezes. She has no rales.   Abdominal: Soft. She exhibits no distension. There is no tenderness.   Neurological: She is alert and oriented to person, place, and time. No cranial nerve deficit.   Skin: Skin is warm and dry.       Recent Labs      09/25/18   0655   WBC  3.4*   RBC  4.73   HEMOGLOBIN  14.0   HEMATOCRIT  41.3   MCV  87.3   MCH  29.6   MCHC  33.9   RDW  39.1   PLATELETCT  144*   MPV  9.7     Recent Labs      09/25/18   0655   SODIUM  138   POTASSIUM  4.2   CHLORIDE  104   CO2  26   GLUCOSE  99   BUN  13   CREATININE  0.71   CALCIUM  9.1                      Assessment/Plan     * Seizure disorder (HCC)- (present on admission)   Assessment & Plan    No seizure activity noted over the last 48 hours.    Continue Keppra per neurology and Carbamazepine being tapered off  Keppra at current dosing is $116/month at [facility] for cash price        Suicidal ideations   Assessment & Plan    Patient stable.  Denies SI or HI via . Wants to get home to her children.  Currently on legal hold  Psych following - adjusting medications         Depression, major, recurrent, moderate (HCC)   Assessment & Plan    Psych adjusting medications  See above          Quality-Core Measures   Reviewed items::  Labs reviewed and Medications reviewed  Sandhu catheter::  No Sandhu  DVT prophylaxis pharmacological::  Enoxaparin (Lovenox)

## 2018-09-26 NOTE — CARE PLAN
Problem: Communication  Goal: The ability to communicate needs accurately and effectively will improve    Intervention: Use communication aids and/or /Language Line as appropriate   services used PRN patient education and med pass.

## 2018-09-26 NOTE — PSYCHIATRY
"PSYCHIATRIC FOLLOW-UP:  *Reason for admission:seizure   Legal Hold Status: +     Spoke with pt in Niuean. Growing up her father always told her she was not his dtr and she doesn't know why. Mom always told her if she wanted to eat she would have to work. Pt says her other 4 siblings did not have this treatment. Pt wanted to go to college but couldn't get the money. In 2004 got together with her  and they came to the [place]. She had 3 children.    As a child she had sz and her sister has sz but only once a year. However her sz   Returned in 2007 and she doesn't know why. She says sometimes she feels \"dizzy\" before she falls to the floor and shakes. Sometimes she has the sense of \"life repeating itself (dario viu?) but denies any form of psychosis otherwise. She is very depressed, in part because she can't drive or go anywhere for fear of a sz but also because aside from her  and children she does not have support.    She also says she was always told that their was nothing on her imaging. There is: MRI 2013 and she was given a copy of there report. She asks why she was told nothing was wrong. She kept being told to go to a psychiatrist.      *Psychiatric (Physical) Examination: observed phenomenon:  *Vitals:Blood pressure 109/70, pulse 77, temperature 36.9 °C (98.4 °F), resp. rate 12, height 1.626 m (5' 4.02\"), weight 48.3 kg (106 lb 7.7 oz), last menstrual period 09/18/2018, SpO2 98 %, not currently breastfeeding.  *Appearance/Attitude: clean, good eye contact, cooperative  *Muscle Strength:wnl  *Tone/Gait/Station:not observed  *Speech:wnl  *Thought Process/Associations: linear  *Abnormal/Psychotic Thoughts (ex): none  *Insight/Judgement:fair to good  *Orientation:x 4  *Memory:intact  *Attention/Concentration:intact  *Language:Niuean speaking  *Fund of Knowledge:not tested  *Mood: depressed and anxious  *Affect: tearful, labile.  *SI/HI:  denies    Carbamazepine 8.0   9/18/2018     MRI: 20013: 1. " Left-sided hippocampal sclerosis.  Left-sided hippocampal atrophy and signal abnormality shows no significant change since the previous exam.  There is questionable ipsilateral atrophy of the left mammillary body.    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 Tegretol and keprra  Self report: menses last 8 days and are heavy. Consider GYN appointment    *Plan/Further Workup: pt given a copy of the MRI report to give to her doctor in [place]. geodon dc'd as it is not a primary antidepressant and she is clear in her thinking. Start lexapro 10 mg. Target is 20 mg a day. Also clonazepam for anxiety which may also help with the sz.    TSH    Legal status: extended      Will follow

## 2018-09-26 NOTE — DISCHARGE PLANNING
Legal Hold     Referral: Legal Hold Court     Intervention: Pt presented for legal hold meeting with  to discuss legal options of contesting hold and meeting with the court doctors this afternoon, or not contesting legal hold and continuing the hold for one week.  advised that pt's legal hold will be be continued for one week (10/4/2018).       Plan: Pt's legal hold has been continued for one week. Pt awaiting transfer to an in patient psych facility.

## 2018-09-26 NOTE — PROGRESS NOTES
Assumed care of pt at 1900. Pt AAOx4, RA. Medicated per MAR for pain. Pt primarily Lao speaking, utilized  services for assessment. Pt expressed frustration that she is not at home with her  and children. Pt ambulates with steady gait, SBA. Q15 monitoring.

## 2018-09-26 NOTE — CARE PLAN
Problem: Communication  Goal: The ability to communicate needs accurately and effectively will improve  Outcome: PROGRESSING AS EXPECTED  Pt primarily Honduran speaking, used  service to do assessment with patient. Educated to use call light if she needs anything.     Problem: Pain Management  Goal: Pain level will decrease to patient's comfort goal  Outcome: PROGRESSING AS EXPECTED  Pt c/o pain from menstruation, stated she typically takes advil at home. Pt medicated with ibuprofen per MAR.

## 2018-09-27 PROCEDURE — 700111 HCHG RX REV CODE 636 W/ 250 OVERRIDE (IP): Performed by: INTERNAL MEDICINE

## 2018-09-27 PROCEDURE — 700102 HCHG RX REV CODE 250 W/ 637 OVERRIDE(OP): Performed by: PSYCHIATRY & NEUROLOGY

## 2018-09-27 PROCEDURE — 700102 HCHG RX REV CODE 250 W/ 637 OVERRIDE(OP): Performed by: HOSPITALIST

## 2018-09-27 PROCEDURE — A9270 NON-COVERED ITEM OR SERVICE: HCPCS | Performed by: PSYCHIATRY & NEUROLOGY

## 2018-09-27 PROCEDURE — 700102 HCHG RX REV CODE 250 W/ 637 OVERRIDE(OP): Performed by: INTERNAL MEDICINE

## 2018-09-27 PROCEDURE — A9270 NON-COVERED ITEM OR SERVICE: HCPCS | Performed by: HOSPITALIST

## 2018-09-27 PROCEDURE — 99231 SBSQ HOSP IP/OBS SF/LOW 25: CPT | Performed by: PSYCHIATRY & NEUROLOGY

## 2018-09-27 PROCEDURE — A9270 NON-COVERED ITEM OR SERVICE: HCPCS | Performed by: INTERNAL MEDICINE

## 2018-09-27 PROCEDURE — 99232 SBSQ HOSP IP/OBS MODERATE 35: CPT | Performed by: HOSPITALIST

## 2018-09-27 PROCEDURE — 770001 HCHG ROOM/CARE - MED/SURG/GYN PRIV*

## 2018-09-27 RX ORDER — ESCITALOPRAM OXALATE 10 MG/1
20 TABLET ORAL DAILY
Status: DISCONTINUED | OUTPATIENT
Start: 2018-09-28 | End: 2018-09-28 | Stop reason: HOSPADM

## 2018-09-27 RX ADMIN — CARBAMAZEPINE 100 MG: 100 TABLET, CHEWABLE ORAL at 05:15

## 2018-09-27 RX ADMIN — CLONAZEPAM 0.5 MG: 0.5 TABLET ORAL at 05:15

## 2018-09-27 RX ADMIN — ESCITALOPRAM OXALATE 10 MG: 10 TABLET ORAL at 05:15

## 2018-09-27 RX ADMIN — LEVETIRACETAM 750 MG: 250 TABLET ORAL at 16:39

## 2018-09-27 RX ADMIN — ENOXAPARIN SODIUM 40 MG: 40 INJECTION, SOLUTION INTRAVENOUS; SUBCUTANEOUS at 05:17

## 2018-09-27 RX ADMIN — IBUPROFEN 200 MG: 200 TABLET, FILM COATED ORAL at 05:15

## 2018-09-27 RX ADMIN — LEVETIRACETAM 750 MG: 250 TABLET ORAL at 05:15

## 2018-09-27 RX ADMIN — STANDARDIZED SENNA CONCENTRATE AND DOCUSATE SODIUM 2 TABLET: 8.6; 5 TABLET ORAL at 05:15

## 2018-09-27 RX ADMIN — CLONAZEPAM 0.5 MG: 0.5 TABLET ORAL at 16:39

## 2018-09-27 ASSESSMENT — ENCOUNTER SYMPTOMS
STRIDOR: 0
HEADACHES: 0
SENSORY CHANGE: 0
PND: 0
ABDOMINAL PAIN: 0
WHEEZING: 0
HEARTBURN: 0
DIARRHEA: 0
ORTHOPNEA: 0
BLOOD IN STOOL: 0
DIAPHORESIS: 0
FEVER: 0
BLURRED VISION: 0
WEIGHT LOSS: 0
SHORTNESS OF BREATH: 0
HEMOPTYSIS: 0
VOMITING: 0
DOUBLE VISION: 0
PHOTOPHOBIA: 0
DEPRESSION: 0
NAUSEA: 0
DIZZINESS: 0
FOCAL WEAKNESS: 0
CHILLS: 0
COUGH: 0
MYALGIAS: 0
PALPITATIONS: 0
BRUISES/BLEEDS EASILY: 0
SORE THROAT: 0
SEIZURES: 0

## 2018-09-27 ASSESSMENT — PAIN SCALES - GENERAL
PAINLEVEL_OUTOF10: 2
PAINLEVEL_OUTOF10: 2
PAINLEVEL_OUTOF10: 0
PAINLEVEL_OUTOF10: 0

## 2018-09-27 NOTE — PSYCHIATRY
"PSYCHIATRIC FOLLOW-UP:  *Reason for admission:seizure   Legal Hold Status: +    Calmer today.  responded well to news that something was found on her brain. She feels more hopeful that her life might be different in some ways. She slept well. No side effects.     *Psychiatric (Physical) Examination: observed phenomenon:  *Vitals:Blood pressure 114/66, pulse 64, temperature 36.7 °C (98.1 °F), resp. rate 16, height 1.626 m (5' 4.02\"), weight 48.3 kg (106 lb 7.7 oz), last menstrual period 09/18/2018, SpO2 99 %, not currently breastfeeding.  *Appearance/Attitude: clean, good eye contact, cooperative  *Muscle Strength:wnl  *Tone/Gait/Station:not observed  *Speech:wnl  *Thought Process/Associations: linear  *Abnormal/Psychotic Thoughts (ex): none  *Insight/Judgement:fair to good  *Orientation:x 4  *Memory:intact  *Attention/Concentration:intact  *Language:Polish speaking  *Fund of Knowledge:not tested  *Mood: better  *Affect: depressed but not labile  *SI/HI:  denies  TSH: wnl    *ASSESSMENT: ( acute, chronic, acute on chronic, complicated, stable, resolved)  Major Depression moderate without psychosis: improved?     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 Tegretol and keprra  Self report: menses last 8 days and are heavy. Consider GYN appointment    *Plan/Further Workup: increase lexapro  Legal status: extended    Will follow   "

## 2018-09-27 NOTE — CARE PLAN
Problem: Communication  Goal: The ability to communicate needs accurately and effectively will improve  Outcome: PROGRESSING AS EXPECTED   iPad used to communicate with pt. Able to express needs.     Problem: Knowledge Deficit  Goal: Knowledge of disease process/condition, treatment plan, diagnostic tests, and medications will improve  Outcome: PROGRESSING AS EXPECTED  Pt updated on POC, aware that we are changing her medications so that she does not have the negative side effects that she had before. Questions answered at this time.

## 2018-09-27 NOTE — PROGRESS NOTES
[facility] Hospitalist Progress Note    Date of Service: 2018    Chief Complaint  33 y.o. female admitted 2018 with seizure disorder and suicidal ideation. Patient was treated with the addition of Keppra to her Tegretol with cessation of her seizures while inpatient. MRI shows mesial temporal sclerosis. She expressed brief suicidal ideation to a staff member at the psychiatry was consulted regarding her care    Interval Problem Update  Seizures--controlled on Tegretol and Keppra.  SI--currently denies suicidal ideation. No complaints today. Translation services used for encounter.    Consultants/Specialty  Psychiatry    Disposition  Home versus inpatient psych -- continues on legal hold. Medically clear.         Review of Systems   Constitutional: Negative for chills, diaphoresis, fever and weight loss.   HENT: Negative for ear pain, sore throat and tinnitus.    Eyes: Negative for blurred vision, double vision and photophobia.   Respiratory: Negative for cough, hemoptysis, shortness of breath, wheezing and stridor.    Cardiovascular: Negative for chest pain, palpitations, orthopnea and PND.   Gastrointestinal: Negative for abdominal pain, blood in stool, diarrhea, heartburn, melena, nausea and vomiting.   Genitourinary: Negative for dysuria, hematuria and urgency.   Musculoskeletal: Negative for joint pain and myalgias.   Neurological: Negative for dizziness, sensory change, focal weakness, seizures and headaches.   Endo/Heme/Allergies: Does not bruise/bleed easily.   Psychiatric/Behavioral: Negative for depression and suicidal ideas.      Physical Exam  Laboratory/Imaging   Hemodynamics  Temp (24hrs), Av.7 °C (98.1 °F), Min:36.4 °C (97.6 °F), Max:36.9 °C (98.5 °F)   Temperature: 36.7 °C (98.1 °F)  Pulse  Av.7  Min: 61  Max: 96    Blood Pressure: 114/66      Respiratory      Respiration: 16, Pulse Oximetry: 99 %             Fluids    Intake/Output Summary (Last 24 hours) at 18 1516  Last data  filed at 09/27/18 1400   Gross per 24 hour   Intake              680 ml   Output                0 ml   Net              680 ml       Nutrition  Orders Placed This Encounter   Procedures   • Diet Order Regular     Standing Status:   Standing     Number of Occurrences:   1     Order Specific Question:   Diet:     Answer:   Regular [1]     Physical Exam   Constitutional: She is oriented to person, place, and time. She appears well-developed and well-nourished. No distress.   HENT:   Head: Normocephalic and atraumatic.   Eyes: Pupils are equal, round, and reactive to light. EOM are normal.   Neck: Neck supple.   Cardiovascular: Normal rate, regular rhythm and normal heart sounds.    Pulmonary/Chest: Effort normal and breath sounds normal. No respiratory distress. She has no wheezes. She has no rales.   Abdominal: Soft. She exhibits no distension. There is no tenderness.   Neurological: She is alert and oriented to person, place, and time. No cranial nerve deficit.   Skin: Skin is warm and dry.       Recent Labs      09/25/18   0655   WBC  3.4*   RBC  4.73   HEMOGLOBIN  14.0   HEMATOCRIT  41.3   MCV  87.3   MCH  29.6   MCHC  33.9   RDW  39.1   PLATELETCT  144*   MPV  9.7     Recent Labs      09/25/18   0655   SODIUM  138   POTASSIUM  4.2   CHLORIDE  104   CO2  26   GLUCOSE  99   BUN  13   CREATININE  0.71   CALCIUM  9.1                      Assessment/Plan     * Seizure disorder (HCC)- (present on admission)   Assessment & Plan    No seizure activity noted over the last 48 hours.    Continue Keppra per neurology and Carbamazepine being tapered off  Keppra at current dosing is $116/month at [facility] for cash price        Suicidal ideations   Assessment & Plan    Patient stable.  Denies SI or HI via . Wants to get home to her children.  Currently on legal hold  Psych following - adjusting medications        Depression, major, recurrent, moderate (HCC)   Assessment & Plan    Psych adjusting  medications  See above          Quality-Core Measures   Reviewed items::  Labs reviewed and Medications reviewed  Sandhu catheter::  No Sandhu  DVT prophylaxis pharmacological::  Enoxaparin (Lovenox)

## 2018-09-27 NOTE — DISCHARGE PLANNING
Agency/Facility Name: [facility]  Spoke To: Unique  Outcome: Referral pending and no available beds.

## 2018-09-27 NOTE — PROGRESS NOTES
Pt a/o *4, SU, c/o pain in neck, heat pad applied w +results. Haitian speaking only. Legal hold in place, Q15 monitoring. BM today. Pt calm and pleasant throughout shift.

## 2018-09-27 NOTE — PROGRESS NOTES
Assumed care of pt at 1900. AAOx4, RA. Medicated per MAR for shoulder pain.  iPad used for communication. Pt expressed understanding that she will be here for a few days while her medication is being changed to reduce the negative side effects that she experienced previously. Q15 monitoring in place.

## 2018-09-28 VITALS
OXYGEN SATURATION: 97 % | DIASTOLIC BLOOD PRESSURE: 74 MMHG | RESPIRATION RATE: 17 BRPM | HEIGHT: 64 IN | BODY MASS INDEX: 18.18 KG/M2 | HEART RATE: 63 BPM | TEMPERATURE: 98.2 F | SYSTOLIC BLOOD PRESSURE: 108 MMHG | WEIGHT: 106.48 LBS

## 2018-09-28 PROBLEM — R45.851 SUICIDAL IDEATIONS: Status: RESOLVED | Noted: 2018-09-19 | Resolved: 2018-09-28

## 2018-09-28 PROCEDURE — A9270 NON-COVERED ITEM OR SERVICE: HCPCS | Performed by: PSYCHIATRY & NEUROLOGY

## 2018-09-28 PROCEDURE — 700102 HCHG RX REV CODE 250 W/ 637 OVERRIDE(OP): Performed by: PSYCHIATRY & NEUROLOGY

## 2018-09-28 PROCEDURE — 700102 HCHG RX REV CODE 250 W/ 637 OVERRIDE(OP): Performed by: HOSPITALIST

## 2018-09-28 PROCEDURE — A9270 NON-COVERED ITEM OR SERVICE: HCPCS | Performed by: INTERNAL MEDICINE

## 2018-09-28 PROCEDURE — 99231 SBSQ HOSP IP/OBS SF/LOW 25: CPT | Performed by: PSYCHIATRY & NEUROLOGY

## 2018-09-28 PROCEDURE — 99239 HOSP IP/OBS DSCHRG MGMT >30: CPT | Performed by: HOSPITALIST

## 2018-09-28 PROCEDURE — 700111 HCHG RX REV CODE 636 W/ 250 OVERRIDE (IP): Performed by: INTERNAL MEDICINE

## 2018-09-28 PROCEDURE — A9270 NON-COVERED ITEM OR SERVICE: HCPCS | Performed by: HOSPITALIST

## 2018-09-28 PROCEDURE — 700102 HCHG RX REV CODE 250 W/ 637 OVERRIDE(OP): Performed by: INTERNAL MEDICINE

## 2018-09-28 RX ORDER — CLONAZEPAM 0.5 MG/1
0.5 TABLET ORAL 2 TIMES DAILY
Qty: 60 TAB | Refills: 1 | Status: SHIPPED | OUTPATIENT
Start: 2018-09-28 | End: 2018-10-28

## 2018-09-28 RX ORDER — CARBAMAZEPINE 100 MG/1
100 TABLET, CHEWABLE ORAL DAILY
Qty: 2 TAB | Refills: 0 | Status: SHIPPED | OUTPATIENT
Start: 2018-09-29 | End: 2018-10-30

## 2018-09-28 RX ORDER — LEVETIRACETAM 750 MG/1
750 TABLET ORAL 2 TIMES DAILY
Qty: 60 TAB | Refills: 2 | Status: SHIPPED | OUTPATIENT
Start: 2018-09-28 | End: 2018-10-30 | Stop reason: SDUPTHER

## 2018-09-28 RX ORDER — ESCITALOPRAM OXALATE 20 MG/1
20 TABLET ORAL DAILY
Qty: 30 TAB | Refills: 2 | Status: SHIPPED | OUTPATIENT
Start: 2018-09-29 | End: 2018-10-30 | Stop reason: SDUPTHER

## 2018-09-28 RX ADMIN — ESCITALOPRAM OXALATE 20 MG: 10 TABLET ORAL at 05:16

## 2018-09-28 RX ADMIN — LEVETIRACETAM 750 MG: 250 TABLET ORAL at 05:17

## 2018-09-28 RX ADMIN — IBUPROFEN 200 MG: 200 TABLET, FILM COATED ORAL at 00:56

## 2018-09-28 RX ADMIN — CLONAZEPAM 0.5 MG: 0.5 TABLET ORAL at 05:17

## 2018-09-28 RX ADMIN — CARBAMAZEPINE 100 MG: 100 TABLET, CHEWABLE ORAL at 05:17

## 2018-09-28 RX ADMIN — ENOXAPARIN SODIUM 40 MG: 40 INJECTION, SOLUTION INTRAVENOUS; SUBCUTANEOUS at 05:16

## 2018-09-28 ASSESSMENT — PAIN SCALES - GENERAL: PAINLEVEL_OUTOF10: 4

## 2018-09-28 NOTE — DISCHARGE PLANNING
Anticipated Discharge Disposition: Home    Action: Patient's Legal Hold Discontinued.  Patient requires Carbamazepine, Escitalopram, and Keppra upon d/c.  LSW faxed approved services form for $25.09 to [facility].    Barriers to Discharge: None    Plan: LSW to continue to assist with d/c as needed.

## 2018-09-28 NOTE — DISCHARGE INSTRUCTIONS
Discharge Instructions    Discharged to home by car with relative. Discharged via walking, hospital escort: Yes.  Special equipment needed: Not Applicable    Be sure to schedule a follow-up appointment with your primary care doctor or any specialists as instructed.     Discharge Plan:   Influenza Vaccine Indication: Patient Refuses    I understand that a diet low in cholesterol, fat, and sodium is recommended for good health. Unless I have been given specific instructions below for another diet, I accept this instruction as my diet prescription.   Other diet: Regular    Special Instructions: None    · Is patient discharged on Warfarin / Coumadin?   No     Depression / Suicide Risk    As you are discharged from this [facility] facility, it is important to learn how to keep safe from harming yourself.    Recognize the warning signs:  · Abrupt changes in personality, positive or negative- including increase in energy   · Giving away possessions  · Change in eating patterns- significant weight changes-  positive or negative  · Change in sleeping patterns- unable to sleep or sleeping all the time   · Unwillingness or inability to communicate  · Depression  · Unusual sadness, discouragement and loneliness  · Talk of wanting to die  · Neglect of personal appearance   · Rebelliousness- reckless behavior  · Withdrawal from people/activities they love  · Confusion- inability to concentrate     If you or a loved one observes any of these behaviors or has concerns about self-harm, here's what you can do:  · Talk about it- your feelings and reasons for harming yourself  · Remove any means that you might use to hurt yourself (examples: pills, rope, extension cords, firearm)  · Get professional help from the community (Mental Health, Substance Abuse, psychological counseling)  · Do not be alone:Call your Safe Contact- someone whom you trust who will be there for you.  · Call your local CRISIS HOTLINE 005-2156 or  315-867-0808  · Call your local Children's Mobile Crisis Response Team [place] (102) 832-9516 or www.PandaDoc.TierPM  · Call the toll free National Suicide Prevention Hotlines   · National Suicide Prevention Lifeline 619-379-ZOIE (4643)  · National [employer] Line Network 800-SUICIDE (270-6451)

## 2018-09-28 NOTE — CARE PLAN
Problem: Communication  Goal: The ability to communicate needs accurately and effectively will improve  Outcome: PROGRESSING AS EXPECTED  Used ipad to go over plan of care, pt denies SI, understands q15 checks    Problem: Safety  Goal: Will remain free from injury  Outcome: PROGRESSING AS EXPECTED  Steady when up, q15 checks, no bed alarm

## 2018-09-28 NOTE — DISCHARGE PLANNING
Agency/Facility Name: [facility]   Spoke To: Risa  Outcome: Pending bed availability.

## 2018-09-28 NOTE — DISCHARGE PLANNING
Agency/Facility Name: [facility]  Spoke To: Unique  Outcome: Pending, no open beds through the weekend.

## 2018-09-28 NOTE — PROGRESS NOTES
Patient DC to home in stable condition, Tablet interpretation services used for DC and PIV removed. Patient and family verbalize understanding of DC instructions.

## 2018-09-28 NOTE — PROGRESS NOTES
Pt A&O and Ipad  used at start of shift. Went over patients feelings and she denies feeling suicidal now that she knows what is wrong with her.Stated she is ready to get home to her kids. Pt rested well all night. Woke once with a HA and given motrin. No other issues overnight.

## 2018-09-28 NOTE — PSYCHIATRY
"PSYCHIATRIC FOLLOW-UP:  *Reason for admission: sz   Legal Hold Status: +, now dc'd    Slept, feels less anxious and sadness has improved. She is no longer hopeless either. No dizziness or events. No SI/HI.    *Psychiatric (Physical) Examination: observed phenomenon:  *Vitals:Blood pressure 108/74, pulse 63, temperature 36.8 °C (98.2 °F), resp. rate 17, height 1.626 m (5' 4.02\"), weight 48.3 kg (106 lb 7.7 oz), last menstrual period 09/18/2018, SpO2 97 %, not currently breastfeeding.  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 Tegretol and keprra  Self report: menses last 8 days and are heavy. Consider GYN appointment    *Plan/Further Workup:  will be by later in the pm.  Legal status: dc'd. Scripts written/sent   Signing off   "

## 2018-09-28 NOTE — DISCHARGE PLANNING
Received Stipulation and Order from the court continuing pt until 10/4/2018. Sent copy to unit LSW.

## 2018-10-01 NOTE — DISCHARGE PLANNING
Notice of withdrawal filed with the court via [email or web address], scanned copy of verified notice onto .

## 2018-10-23 NOTE — ADDENDUM NOTE
Encounter addended by: Lacey Gomes R.N. on: 10/23/2018  1:17 PM<BR>    Actions taken: Flowsheet accepted

## 2019-01-18 ENCOUNTER — HOSPITAL ENCOUNTER (OUTPATIENT)
Facility: MEDICAL CENTER | Age: 34
End: 2019-01-19
Attending: EMERGENCY MEDICINE | Admitting: HOSPITALIST

## 2019-01-18 ENCOUNTER — APPOINTMENT (OUTPATIENT)
Dept: RADIOLOGY | Facility: MEDICAL CENTER | Age: 34
End: 2019-01-18
Attending: EMERGENCY MEDICINE

## 2019-01-18 ENCOUNTER — APPOINTMENT (OUTPATIENT)
Dept: RADIOLOGY | Facility: MEDICAL CENTER | Age: 34
End: 2019-01-18
Attending: HOSPITALIST

## 2019-01-18 DIAGNOSIS — G40.909 SEIZURE DISORDER (HCC): ICD-10-CM

## 2019-01-18 DIAGNOSIS — R56.9 SEIZURE (HCC): ICD-10-CM

## 2019-01-18 DIAGNOSIS — F41.9 ANXIETY: ICD-10-CM

## 2019-01-18 PROBLEM — G44.89 OTHER HEADACHE SYNDROME: Status: ACTIVE | Noted: 2019-01-18

## 2019-01-18 LAB
ALBUMIN SERPL BCP-MCNC: 4.6 G/DL (ref 3.2–4.9)
ALBUMIN/GLOB SERPL: 2 G/DL
ALP SERPL-CCNC: 51 U/L (ref 30–99)
ALT SERPL-CCNC: 13 U/L (ref 2–50)
AMPHET UR QL SCN: NEGATIVE
ANION GAP SERPL CALC-SCNC: 8 MMOL/L (ref 0–11.9)
APPEARANCE UR: CLEAR
AST SERPL-CCNC: 15 U/L (ref 12–45)
BACTERIA #/AREA URNS HPF: ABNORMAL /HPF
BARBITURATES UR QL SCN: NEGATIVE
BASOPHILS # BLD AUTO: 0 % (ref 0–1.8)
BASOPHILS # BLD: 0 K/UL (ref 0–0.12)
BENZODIAZ UR QL SCN: NEGATIVE
BILIRUB SERPL-MCNC: 0.5 MG/DL (ref 0.1–1.5)
BILIRUB UR QL STRIP.AUTO: NEGATIVE
BUN SERPL-MCNC: 13 MG/DL (ref 8–22)
BZE UR QL SCN: NEGATIVE
CALCIUM SERPL-MCNC: 9 MG/DL (ref 8.5–10.5)
CANNABINOIDS UR QL SCN: NEGATIVE
CHLORIDE SERPL-SCNC: 107 MMOL/L (ref 96–112)
CO2 SERPL-SCNC: 26 MMOL/L (ref 20–33)
COLOR UR: YELLOW
CREAT SERPL-MCNC: 0.64 MG/DL (ref 0.5–1.4)
EKG IMPRESSION: NORMAL
EOSINOPHIL # BLD AUTO: 0 K/UL (ref 0–0.51)
EOSINOPHIL NFR BLD: 0 % (ref 0–6.9)
EPI CELLS #/AREA URNS HPF: ABNORMAL /HPF
ERYTHROCYTE [DISTWIDTH] IN BLOOD BY AUTOMATED COUNT: 39.7 FL (ref 35.9–50)
GLOBULIN SER CALC-MCNC: 2.3 G/DL (ref 1.9–3.5)
GLUCOSE SERPL-MCNC: 108 MG/DL (ref 65–99)
GLUCOSE UR STRIP.AUTO-MCNC: NEGATIVE MG/DL
HCG SERPL QL: NEGATIVE
HCT VFR BLD AUTO: 39.7 % (ref 37–47)
HGB BLD-MCNC: 13.8 G/DL (ref 12–16)
HYALINE CASTS #/AREA URNS LPF: ABNORMAL /LPF
IMM GRANULOCYTES # BLD AUTO: 0.01 K/UL (ref 0–0.11)
IMM GRANULOCYTES NFR BLD AUTO: 0.2 % (ref 0–0.9)
KETONES UR STRIP.AUTO-MCNC: 15 MG/DL
LEUKOCYTE ESTERASE UR QL STRIP.AUTO: ABNORMAL
LYMPHOCYTES # BLD AUTO: 0.74 K/UL (ref 1–4.8)
LYMPHOCYTES NFR BLD: 16.5 % (ref 22–41)
MCH RBC QN AUTO: 30.1 PG (ref 27–33)
MCHC RBC AUTO-ENTMCNC: 34.8 G/DL (ref 33.6–35)
MCV RBC AUTO: 86.5 FL (ref 81.4–97.8)
METHADONE UR QL SCN: NEGATIVE
MICRO URNS: ABNORMAL
MONOCYTES # BLD AUTO: 0.2 K/UL (ref 0–0.85)
MONOCYTES NFR BLD AUTO: 4.5 % (ref 0–13.4)
NEUTROPHILS # BLD AUTO: 3.53 K/UL (ref 2–7.15)
NEUTROPHILS NFR BLD: 78.8 % (ref 44–72)
NITRITE UR QL STRIP.AUTO: POSITIVE
NRBC # BLD AUTO: 0 K/UL
NRBC BLD-RTO: 0 /100 WBC
OPIATES UR QL SCN: NEGATIVE
OXYCODONE UR QL SCN: NEGATIVE
PCP UR QL SCN: NEGATIVE
PH UR STRIP.AUTO: 8 [PH]
PLATELET # BLD AUTO: 136 K/UL (ref 164–446)
PMV BLD AUTO: 10 FL (ref 9–12.9)
POTASSIUM SERPL-SCNC: 3.7 MMOL/L (ref 3.6–5.5)
PROPOXYPH UR QL SCN: NEGATIVE
PROT SERPL-MCNC: 6.9 G/DL (ref 6–8.2)
PROT UR QL STRIP: NEGATIVE MG/DL
RBC # BLD AUTO: 4.59 M/UL (ref 4.2–5.4)
RBC # URNS HPF: ABNORMAL /HPF
RBC UR QL AUTO: NEGATIVE
SODIUM SERPL-SCNC: 141 MMOL/L (ref 135–145)
SP GR UR STRIP.AUTO: 1.01
UROBILINOGEN UR STRIP.AUTO-MCNC: 0.2 MG/DL
WBC # BLD AUTO: 4.5 K/UL (ref 4.8–10.8)
WBC #/AREA URNS HPF: ABNORMAL /HPF

## 2019-01-18 PROCEDURE — 700102 HCHG RX REV CODE 250 W/ 637 OVERRIDE(OP): Performed by: HOSPITALIST

## 2019-01-18 PROCEDURE — 700117 HCHG RX CONTRAST REV CODE 255: Performed by: HOSPITALIST

## 2019-01-18 PROCEDURE — 70553 MRI BRAIN STEM W/O & W/DYE: CPT

## 2019-01-18 PROCEDURE — 99285 EMERGENCY DEPT VISIT HI MDM: CPT

## 2019-01-18 PROCEDURE — 95951 EEG: CPT | Mod: 52 | Performed by: PSYCHIATRY & NEUROLOGY

## 2019-01-18 PROCEDURE — 84703 CHORIONIC GONADOTROPIN ASSAY: CPT

## 2019-01-18 PROCEDURE — 700105 HCHG RX REV CODE 258: Performed by: HOSPITALIST

## 2019-01-18 PROCEDURE — A9270 NON-COVERED ITEM OR SERVICE: HCPCS | Performed by: NURSE PRACTITIONER

## 2019-01-18 PROCEDURE — 81001 URINALYSIS AUTO W/SCOPE: CPT

## 2019-01-18 PROCEDURE — 90471 IMMUNIZATION ADMIN: CPT

## 2019-01-18 PROCEDURE — G0378 HOSPITAL OBSERVATION PER HR: HCPCS

## 2019-01-18 PROCEDURE — 700102 HCHG RX REV CODE 250 W/ 637 OVERRIDE(OP): Performed by: NURSE PRACTITIONER

## 2019-01-18 PROCEDURE — A9270 NON-COVERED ITEM OR SERVICE: HCPCS | Performed by: HOSPITALIST

## 2019-01-18 PROCEDURE — 90686 IIV4 VACC NO PRSV 0.5 ML IM: CPT | Performed by: HOSPITALIST

## 2019-01-18 PROCEDURE — 80307 DRUG TEST PRSMV CHEM ANLYZR: CPT

## 2019-01-18 PROCEDURE — 93005 ELECTROCARDIOGRAM TRACING: CPT | Performed by: EMERGENCY MEDICINE

## 2019-01-18 PROCEDURE — 99245 OFF/OP CONSLTJ NEW/EST HI 55: CPT | Mod: 25 | Performed by: PSYCHIATRY & NEUROLOGY

## 2019-01-18 PROCEDURE — A9585 GADOBUTROL INJECTION: HCPCS | Performed by: HOSPITALIST

## 2019-01-18 PROCEDURE — 80053 COMPREHEN METABOLIC PANEL: CPT

## 2019-01-18 PROCEDURE — 99218 PR INITIAL OBSERVATION CARE,LEVL I: CPT | Performed by: HOSPITALIST

## 2019-01-18 PROCEDURE — 700111 HCHG RX REV CODE 636 W/ 250 OVERRIDE (IP): Performed by: HOSPITALIST

## 2019-01-18 PROCEDURE — 85025 COMPLETE CBC W/AUTO DIFF WBC: CPT

## 2019-01-18 PROCEDURE — 36415 COLL VENOUS BLD VENIPUNCTURE: CPT

## 2019-01-18 PROCEDURE — 95951 EEG: CPT | Mod: 26,52 | Performed by: PSYCHIATRY & NEUROLOGY

## 2019-01-18 RX ORDER — POLYETHYLENE GLYCOL 3350 17 G/17G
1 POWDER, FOR SOLUTION ORAL
Status: DISCONTINUED | OUTPATIENT
Start: 2019-01-18 | End: 2019-01-19 | Stop reason: HOSPADM

## 2019-01-18 RX ORDER — LEVETIRACETAM 500 MG/1
1000 TABLET ORAL 2 TIMES DAILY
Status: DISCONTINUED | OUTPATIENT
Start: 2019-01-18 | End: 2019-01-19 | Stop reason: HOSPADM

## 2019-01-18 RX ORDER — ESCITALOPRAM OXALATE 10 MG/1
20 TABLET ORAL EVERY EVENING
Status: DISCONTINUED | OUTPATIENT
Start: 2019-01-18 | End: 2019-01-19 | Stop reason: HOSPADM

## 2019-01-18 RX ORDER — SODIUM CHLORIDE 9 MG/ML
INJECTION, SOLUTION INTRAVENOUS CONTINUOUS
Status: DISCONTINUED | OUTPATIENT
Start: 2019-01-18 | End: 2019-01-19 | Stop reason: HOSPADM

## 2019-01-18 RX ORDER — ACETAMINOPHEN 325 MG/1
650 TABLET ORAL EVERY 6 HOURS PRN
Status: DISCONTINUED | OUTPATIENT
Start: 2019-01-18 | End: 2019-01-19 | Stop reason: HOSPADM

## 2019-01-18 RX ORDER — BISACODYL 10 MG
10 SUPPOSITORY, RECTAL RECTAL
Status: DISCONTINUED | OUTPATIENT
Start: 2019-01-18 | End: 2019-01-19 | Stop reason: HOSPADM

## 2019-01-18 RX ORDER — CLONAZEPAM 0.5 MG/1
0.25 TABLET ORAL 2 TIMES DAILY PRN
Status: DISCONTINUED | OUTPATIENT
Start: 2019-01-18 | End: 2019-01-19 | Stop reason: HOSPADM

## 2019-01-18 RX ORDER — AMOXICILLIN 250 MG
2 CAPSULE ORAL 2 TIMES DAILY
Status: DISCONTINUED | OUTPATIENT
Start: 2019-01-18 | End: 2019-01-19 | Stop reason: HOSPADM

## 2019-01-18 RX ORDER — GADOBUTROL 604.72 MG/ML
7.5 INJECTION INTRAVENOUS ONCE
Status: COMPLETED | OUTPATIENT
Start: 2019-01-18 | End: 2019-01-18

## 2019-01-18 RX ADMIN — LEVETIRACETAM 750 MG: 500 TABLET ORAL at 12:16

## 2019-01-18 RX ADMIN — SODIUM CHLORIDE: 9 INJECTION, SOLUTION INTRAVENOUS at 10:27

## 2019-01-18 RX ADMIN — CLONAZEPAM 0.25 MG: 0.5 TABLET ORAL at 13:25

## 2019-01-18 RX ADMIN — INFLUENZA A VIRUS A/MICHIGAN/45/2015 X-275 (H1N1) ANTIGEN (FORMALDEHYDE INACTIVATED), INFLUENZA A VIRUS A/SINGAPORE/INFIMH-16-0019/2016 IVR-186 (H3N2) ANTIGEN (FORMALDEHYDE INACTIVATED), INFLUENZA B VIRUS B/PHUKET/3073/2013 ANTIGEN (FORMALDEHYDE INACTIVATED), AND INFLUENZA B VIRUS B/MARYLAND/15/2016 BX-69A ANTIGEN (FORMALDEHYDE INACTIVATED) 0.5 ML: 15; 15; 15; 15 INJECTION, SUSPENSION INTRAMUSCULAR at 13:25

## 2019-01-18 RX ADMIN — LEVETIRACETAM 1000 MG: 500 TABLET ORAL at 18:44

## 2019-01-18 RX ADMIN — GADOBUTROL 7.5 ML: 604.72 INJECTION INTRAVENOUS at 13:04

## 2019-01-18 RX ADMIN — ESCITALOPRAM OXALATE 20 MG: 10 TABLET ORAL at 18:43

## 2019-01-18 RX ADMIN — ACETAMINOPHEN 650 MG: 325 TABLET, FILM COATED ORAL at 22:42

## 2019-01-18 RX ADMIN — SODIUM CHLORIDE: 9 INJECTION, SOLUTION INTRAVENOUS at 22:32

## 2019-01-18 ASSESSMENT — ENCOUNTER SYMPTOMS
DEPRESSION: 1
CHILLS: 0
RESPIRATORY NEGATIVE: 1
VOMITING: 0
SEIZURES: 1
FEVER: 0
MUSCULOSKELETAL NEGATIVE: 1
NAUSEA: 1

## 2019-01-18 ASSESSMENT — LIFESTYLE VARIABLES: EVER_SMOKED: NEVER

## 2019-01-18 ASSESSMENT — PAIN SCALES - GENERAL
PAINLEVEL_OUTOF10: 3
PAINLEVEL_OUTOF10: 2
PAINLEVEL_OUTOF10: 2

## 2019-01-18 NOTE — PROGRESS NOTES
Assessment completed. Pt A&Ox4. Flat affect. German speaking, able to communicate with this RN. Respirations are even and unlabored on RA. Pt reports  pain to right side of body, and back of neck/head. Patient states pain is 2/10 and tolerable.at this time. Monitors applied, VS stable, call light and belongings within reach. POC updated. Pt educated on room and call light, pt verbalized understanding. Communication board updated. Needs met. Patient having breakfast.

## 2019-01-18 NOTE — ED NOTES
PIV established, blood collected and sent to lab, pt currently states she cannot provide a urine sample at this time.

## 2019-01-18 NOTE — PROCEDURES
EEG 01/18/19 4:55 PM    VIDEO ELECTROENCEPHALOGRAM / EPILEPSY MONITORING UNIT REPORT      Referring provider: DR OSORIO    DOS:   1/18/2019      INDICATION:  Jennifer Alexander 33 y.o. female presenting with Seizure    CURRENT ANTIEPILEPTIC REGIMEN: KEPPRA     DURATION: 28 minutes      TECHNIQUE: A 30-channel video electroencephalogram (VEEG) was performed in accordance with the international 10-20 system. This digital study was reviewed in bipolar and referential montages. The recording examined the patient during wakeful, drowsy and sleep states.         DESCRIPTION OF THE RECORD:  During the awake state, background shows 10-11 Hz alpha activity posteriorly with amplitude of 70 mV over right. The EEG background was attenuated over the left. There are intermittent epileptic spike/sharp and monomorphic delta over left. At times, spread to the right frontal  There was reactivity with eye opening/closure.  Normal anterior-posterior gradient was noted with faster beta frequencies seen anteriorly.  During drowsiness, high-amplitude delta frequencies were seen.    During the sleep state, background shows diffuse high-amplitude 4-5 Hz delta activity.  Symmetrical high-amplitude sleep spindles and vertex sharp activities were seen in the central leads.    ACTIVATION PROCEDURES:   Hyperventilation was performed by the patient. The technician performing the test noted good effort. This technique produced electroencephalographic changes in keeping with the expected bilaterally synchronous, frontally predominant, high amplitude slow waves build up.     Intermittent Photic stimulation was  performed in a stepwise fashion from 1 to 30 Hz and elicited a normal response (photic driving), most noticeable in the posterior leads.      ICTAL AND/OR INTERICTAL FINDINGS:   No focal or generalized epileptiform activity was noted.  There are intermittent epileptic spike/sharp and monomorphic delta over left. At times, spread to the  right frontal  No seizures were recorded during the study.     EVENT(S):  NONE    EKG: sampling review of EKG recording demonstrated regular rhythm      INTERPRETATION:       ________________________________________________________________________    This is abnormal routine video EEG recording in the awake and drowsy/sleep state(s).    This scalp EEG denotes        1. Active focal cortical irritability / dysfunction over left temporal --this patten is consistent with left temporal lobe seizure    EEG 01/18/19 5:00 PM    ________________________________________________________________________  ________________________________________________________________________      REFERENCE    EEG is described as 'abnormal' (that is 'not normal' brain activity) does not 'prove' that the person has epilepsy and does not mean the patient needs treatment. ... Also, many people who do have epilepsy will only have 'abnormal' activity on the EEG if they have a seizure at the time the test is happening.     ________________________________________________________________________

## 2019-01-18 NOTE — CONSULTS
"Chief Complaint   Patient presents with   • Flu Like Symptoms     X 1 week. Pt states, \"My whole body hurts, from my head to toes. I feel like vomiting.\"   • Seizure     Pt reports daily seizures.        Problem List Items Addressed This Visit     None      Visit Diagnoses     Seizure (HCC)        Relevant Medications    clonazePAM (KLONOPIN) tablet 0.25 mg    levETIRAcetam (KEPPRA) tablet 750 mg          History of present illness:  This is a 33-year old female with PMHx significant for epilepsy (reportedly from infancy) and depression who presented to [facility] on [facility]/18/19 for a chief complaint of worsening severity/increasing seizure episodes. Patient states that she has had seizure disorder throughout her life; she recalls having \"remission\" from seizures at some point during adolescence, and states that seizures began worsening/occuring more frequently in 2004. In 2008 (around the time she was pregnant and gave birth to her daughter), she states that seizures significantly worsened; had abnormal EEG at that time that revealed predominantly left temporal slowing with sharp wave activity and phase reversal; also with rare sharp wave activity with phase reversals noted on the right temporal head region and one episode of a generalized burst of poly-spike and slow wave activity. Per review of patient's medical record, over the past several years, patient has been on multiple AEDs, including Carbamazipine and Keppra, with some concerns for non-compliance.   Approximately one week ago, patient reportedly saw an outpatient provider who \"changed one of her medications.\" She is unclear which was changed, however admits to a significant increase in seizure activity since that time. She describes episodes as \"blacking out episodes,\" witnessed by her family to have generalized shaking as well. Episodes are reportedly brief, < 1 minute in duration. No associated mouth trauma or incontinence. Additional review of " patients medical record reveals that patient was apparently confused about Lexapro and Klonopin regimens, and was ?taking double the prescribed dose of Lexapro. There is also concern that patient was not taking/abruptly stopped taking her Klonopin accidentally. Patient was ultimately brought to the ED today out of concern for worsening/increasing seizure episodes.  Currently, patient is laying in bed; awake and alert. Admits to feeling anxious. She does not know when her last seizure occurred nor details of last/most recent seizures (other than what was described above). She admits to pain to Right side of body. Denies headache or dizziness. Denies weakness/numbness/tingling. Denies new problems with vision, speech or swallowing.     Neurology has been consulted by Dr. Eric Rivera to further evaluate the findings noted above.     Past medical history:   Past Medical History:   Diagnosis Date   • Anemia 1/30/2013   • Depression, major, recurrent, moderate (HCC) 9/26/2018   • Epilepsy associated with specific stimuli (HCC)    • Head ache    • Seizure (HCC)        Past surgical history:   No past surgical history on file.    Family history:   Family History   Problem Relation Age of Onset   • Hypertension Father        Social history:   Social History     Social History   • Marital status: Single     Spouse name: N/A   • Number of children: N/A   • Years of education: N/A     Occupational History   • Not on file.     Social History Main Topics   • Smoking status: Never Smoker   • Smokeless tobacco: Never Used   • Alcohol use No   • Drug use: No   • Sexual activity: Yes     Partners: Male     Other Topics Concern   • Not on file     Social History Narrative    ** Merged History Encounter **            Current medications:   Current Facility-Administered Medications   Medication Dose   • senna-docusate (PERICOLACE or SENOKOT S) 8.6-50 MG per tablet 2 Tab  2 Tab    And   • polyethylene glycol/lytes (MIRALAX) PACKET 1  Packet  1 Packet    And   • magnesium hydroxide (MILK OF MAGNESIA) suspension 30 mL  30 mL    And   • bisacodyl (DULCOLAX) suppository 10 mg  10 mg   • NS infusion     • clonazePAM (KLONOPIN) tablet 0.25 mg  0.25 mg   • escitalopram (LEXAPRO) tablet 20 mg  20 mg   • levETIRAcetam (KEPPRA) tablet 750 mg  750 mg   • acetaminophen (TYLENOL) tablet 650 mg  650 mg       Medication Allergy:  No Known Allergies    Review of systems:   Constitutional: denies fever, night sweats, weight loss.   Eyes: denies acute vision change, eye pain or secretion.   Ears, Nose, Mouth, Throat: denies nasal secretion, nasal bleeding, difficulty swallowing, hearing loss, tinnitus, vertigo, ear pain, acute dental problems, oral ulcers or lesions.   Endocrine: denies recent weight changes, heat or cold intolerance, polyuria, polydypsia, polyphagia,abnormal hair growth.  Cardiovascular: denies new onset of chest pain, palpitations, syncope, or dyspnea of exertion.  Pulmonary: denies shortness of breath, new onset of cough, hemoptysis, wheezing, chest pain or flu-like symptoms.   GI: denies nausea, vomiting, diarrhea, GI bleeding, change in appetite, abdominal pain, and change in bowel habits.   : denies dysuria, urinary incontinence, hematuria.  Heme/oncology: denies history of easy bruising or bleeding. No history of cancer, DVTor PE.  Allergy/immunology: denies hives/urticaria, or itching.   Dermatologic: denies new rash, or new skin lesions.  Musculoskeletal:denies joint swelling or pain, muscle pain, neck and back pain.   Neurologic: As noted above.   Psychiatric: denies symptoms of depression, anxiety, hallucinations, mood swings or changes, suicidal or homicidal thoughts.     Physical examination:   Vitals:    01/18/19 0800 01/18/19 0819 01/18/19 0915 01/18/19 1320   BP:  132/78  129/79   Pulse: 70 67  98   Resp: (!) 24 18 17   Temp:  36.8 °C (98.2 °F)  36.7 °C (98.1 °F)   TempSrc:  Temporal  Temporal   SpO2: 98% 97%     Weight:  46.5  "kg (102 lb 8.2 oz)     Height:   1.575 m (5' 2\")      General: Patient in no acute distress  HEENT: Normocephalic, no signs of acute trauma.   Neck: supple, no meningeal signs or carotid bruits. There is normal range of motion. No tenderness on exam.   Chest: clear to auscultation. No cough.   CV: RRR, no murmurs.   Skin: no signs of acute rashes or trauma.   Musculoskeletal: joints exhibit full range of motion, without any pain to palpation. There are no signs of joint or muscle swelling. There is no tenderness to deep palpation of muscles.   Psychiatric: No hallucinatory behavior. Denies symptoms of depression or suicidal ideation. Mood and affect appear normal on exam.     NEUROLOGICAL EXAM:   Mental status, orientation: Awake, alert and fully oriented.   Speech and language: speech is clear and fluent. The patient is able to name, repeat and comprehend.   Memory: There is intact recollection of recent and remote events.   Cranial nerve exam: Pupils are 3-4 mm bilaterally and equally reactive to light and accommodation. Visual fields are intact by confrontation. There is no nystagmus on primary or secondary gaze. Intact full EOM in all directions of gaze. Face appears symmetric. Sensation in the face is intact to light touch. Tongue is midline and without any signs of tongue biting or fasciculations. Sternocleidomastoid muscles exhibit is normal strength bilaterally. Shoulder shrug is intact bilaterally.   Motor exam: Strength is 5/5 in all extremities. Tone is normal. No abnormal movements were seen on exam.   Sensory exam reveals normal sense and pinprick in all extremities. Admits to pain to Right side of body.   Deep tendon reflexes:  2+ throughout. Plantar responses are flexor. There is no clonus.   Coordination: shows a normal finger-nose-finger. Normal rapidly alternating movements.   Gait: Not assessed at this time.       ANCILLARY DATA REVIEWED:     Lab Data Review:  Recent Results (from the past 24 " hour(s))   EKG    Collection Time: 19  6:32 AM   Result Value Ref Range    Report       [facility] Emergency Dept.    Test Date:  2019  Pt Name:    LEENA NEELY     Department: ER  MRN:        [id number]                      Room:        12  Gender:     Female                       Technician: [id number]  :        [id number]                   Requested By:SIERRA JAIME  Order #:    [id number]                    Reading MD: SIERRA JAIME MD    Measurements  Intervals                                Axis  Rate:       66                           P:          69  IN:         128                          QRS:        67  QRSD:       82                           T:          56  QT:         380  QTc:        399    Interpretive Statements  SINUS RHYTHM  PROBABLE LEFT ATRIAL ABNORMALITY  RSR' IN V1 OR V2, PROBABLY NORMAL VARIANT  Compared to ECG 2018 18:11:56  RSR' in V1 or V2 now present  Intraventricular conduction delay no longer present  Myocardial infarct finding no longer present    Electronically Signed On 2019 7:46:43 PST by  SIERRA JAIME MD     CBC WITH DIFFERENTIAL    Collection Time: 19  6:43 AM   Result Value Ref Range    WBC 4.5 (L) 4.8 - 10.8 K/uL    RBC 4.59 4.20 - 5.40 M/uL    Hemoglobin 13.8 12.0 - 16.0 g/dL    Hematocrit 39.7 37.0 - 47.0 %    MCV 86.5 81.4 - 97.8 fL    MCH 30.1 27.0 - 33.0 pg    MCHC 34.8 33.6 - 35.0 g/dL    RDW 39.7 35.9 - 50.0 fL    Platelet Count 136 (L) 164 - 446 K/uL    MPV 10.0 9.0 - 12.9 fL    Neutrophils-Polys 78.80 (H) 44.00 - 72.00 %    Lymphocytes 16.50 (L) 22.00 - 41.00 %    Monocytes 4.50 0.00 - 13.40 %    Eosinophils 0.00 0.00 - 6.90 %    Basophils 0.00 0.00 - 1.80 %    Immature Granulocytes 0.20 0.00 - 0.90 %    Nucleated RBC 0.00 /100 WBC    Neutrophils (Absolute) 3.53 2.00 - 7.15 K/uL    Lymphs (Absolute) 0.74 (L) 1.00 - 4.80 K/uL    Monos (Absolute) 0.20 0.00 - 0.85 K/uL    Eos (Absolute) 0.00 0.00 - 0.51 K/uL     Baso (Absolute) 0.00 0.00 - 0.12 K/uL    Immature Granulocytes (abs) 0.01 0.00 - 0.11 K/uL    NRBC (Absolute) 0.00 K/uL   COMP METABOLIC PANEL    Collection Time: 01/18/19  6:43 AM   Result Value Ref Range    Sodium 141 135 - 145 mmol/L    Potassium 3.7 3.6 - 5.5 mmol/L    Chloride 107 96 - 112 mmol/L    Co2 26 20 - 33 mmol/L    Anion Gap 8.0 0.0 - 11.9    Glucose 108 (H) 65 - 99 mg/dL    Bun 13 8 - 22 mg/dL    Creatinine 0.64 0.50 - 1.40 mg/dL    Calcium 9.0 8.5 - 10.5 mg/dL    AST(SGOT) 15 12 - 45 U/L    ALT(SGPT) 13 2 - 50 U/L    Alkaline Phosphatase 51 30 - 99 U/L    Total Bilirubin 0.5 0.1 - 1.5 mg/dL    Albumin 4.6 3.2 - 4.9 g/dL    Total Protein 6.9 6.0 - 8.2 g/dL    Globulin 2.3 1.9 - 3.5 g/dL    A-G Ratio 2.0 g/dL   HCG QUAL SERUM    Collection Time: 01/18/19  6:43 AM   Result Value Ref Range    Beta-Hcg Qualitative Serum Negative Negative   ESTIMATED GFR    Collection Time: 01/18/19  6:43 AM   Result Value Ref Range    GFR If African American >60 >60 mL/min/1.73 m 2    GFR If Non African American >60 >60 mL/min/1.73 m 2   URINALYSIS CULTURE, IF INDICATED    Collection Time: 01/18/19 10:32 AM   Result Value Ref Range    Color Yellow     Character Clear     Specific Gravity 1.012 <1.035    Ph 8.0 5.0 - 8.0    Glucose Negative Negative mg/dL    Ketones 15 (A) Negative mg/dL    Protein Negative Negative mg/dL    Bilirubin Negative Negative    Urobilinogen, Urine 0.2 Negative    Nitrite Positive (A) Negative    Leukocyte Esterase Small (A) Negative    Occult Blood Negative Negative    Micro Urine Req Microscopic    URINE DRUG SCREEN    Collection Time: 01/18/19 10:32 AM   Result Value Ref Range    Amphetamines Urine Negative Negative    Barbiturates Negative Negative    Benzodiazepines Negative Negative    Cocaine Metabolite Negative Negative    Methadone Negative Negative    Opiates Negative Negative    Oxycodone Negative Negative    Phencyclidine -Pcp Negative Negative    Propoxyphene Negative Negative     "Cannabinoid Metab Negative Negative   URINE MICROSCOPIC (W/UA)    Collection Time: 01/18/19 10:32 AM   Result Value Ref Range    WBC 5-10 (A) /hpf    RBC 0-2 /hpf    Bacteria Many (A) None /hpf    Epithelial Cells Moderate (A) /hpf    Hyaline Cast 3-5 (A) /lpf       Labs reviewed by me.    **Reviewed documentation from patient's previous presentations/encouters at [facility].     Imaging reviewed by me:     MR-BRAIN-WITH & W/O   Final Result      1.  Left hippocampal sclerosis. There has been no significant interval change.   2.  There is no evidence of cortical dysplasia or neuronal migrational abnormality.          ASSESSMENT AND PLAN:  33-year old female with PMHx significant for epilepsy (reportedly from infancy) and depression who presented to [facility] on 1/18/19 for a chief complaint of worsening severity/increasing seizure episodes, also with recent medication changes/confusion regarding her medication regimen with concern for abruptly stopping her benzodiazapine (Klonopin) approximately one week ago.  Patient states that she is/has been compliant with Keppra, 750 mg PO BID.  Per patient, she has been experiencing brief  \"black out\" episodes with witnessed whole body convulsions; unclear duration; no mouth trauma or incontinence; other details are limited.     Impression:   -Possible Benzodiazepine withdrawal with associated increase in seizure activity vs. Poorly controlled epilepsy; possible medication non-compliance.   -History of Epilepsy.   -History of Depression.     Recommendations/Plan:     -q4h and PRN neuro assessment. VS per nursing/unit protocol.   -Seizure precautions. Ativan 1 mg IV PRN for break though [facility] only.   -Obtain EEG; will follow up with results and make additional recs accordingly.   -Note findings per MRI Brain w/wo-- Left hippocampal sclerosis; finding associated with epilepsy and thus patient's history. No acute findings.    -Increase Keppra to 1000 mg PO BID. "   -Continue Klonopin 0.5 mg PO BID PRN (Becuase of med regimen confusion, patient is confused as to how often she was taking this prior to recent outpatient visit).   -Provide counseling to patient regarding medication regimen as well as seizure safety; no driving per [place] state law x 3 months retroactive from date of last seizure.  -All other medical management per primary team.     The plan of care above has been discussed with Dr. Ibanez.     Shameka Wilkinson MSN, BSN, AGNP-C  [facility]

## 2019-01-18 NOTE — ED PROVIDER NOTES
"ED Provider Note    CHIEF COMPLAINT  Chief Complaint   Patient presents with   • Flu Like Symptoms     X 1 week. Pt states, \"My whole body hurts, from my head to toes. I feel like vomiting.\"   • Seizure     Pt reports daily seizures.        HPI  Jennifer Alexander is a 33 y.o. female who presents with generalized malaise fatigue.  She is also had weakness over the right side of her body with numbness over the right side of the face.  Symptoms started about a week ago.  She has been having increased frequency of generalized shaking episodes over that time interval as well.  She generally will have an episode every day, but seemed to have more than that recently.  She has a history of known seizures.  She is a poor historian with regards to how she has been taking her medications.  Unclear if she is actually been taking them as directed.  She has not had a fever.  Denies cough or URI symptoms.  Denies specific headache or neck stiffness although does have generalized myalgias..    REVIEW OF SYSTEMS  As per HPI, otherwise a 10 point review of systems is negative    PAST MEDICAL HISTORY  Past Medical History:   Diagnosis Date   • Anemia 1/30/2013   • Depression, major, recurrent, moderate (HCC) 9/26/2018   • Epilepsy associated with specific stimuli (HCC)    • Head ache    • Seizure (HCC)        SOCIAL HISTORY  Social History   Substance Use Topics   • Smoking status: Never Smoker   • Smokeless tobacco: Never Used   • Alcohol use No       SURGICAL HISTORY  No past surgical history on file.    CURRENT MEDICATIONS  Home Medications     Reviewed by Julia Hightower R.N. (Registered Nurse) on 01/18/19 at 0507  Med List Status: Not Addressed   Medication Last Dose Status   clonazePAM (KLONOPIN) 0.5 MG Tab  Active   escitalopram (LEXAPRO) 20 MG tablet  Active   levetiracetam (KEPPRA) 750 MG tablet  Active                ALLERGIES  No Known Allergies    PHYSICAL EXAM  VITAL SIGNS: /84   Pulse 68   Temp 36.1 °C (97 " °F) (Temporal)   Resp 16   Wt 46.9 kg (103 lb 6.3 oz)   LMP 01/04/2019   SpO2 100%   BMI 18.32 kg/m²    Constitutional: Awake and alert.  Anxious  HENT:  Atraumatic, Normocephalic.Oropharynx moist mucus membranes, Nose normal inspection.   Eyes: Normal inspection  Neck: Supple  Cardiovascular: Normal heart rate, Normal rhythm.  Symmetric peripheral pulses.   Thorax & Lungs: No respiratory distress, No wheezing, No rales, No rhonchi, No chest tenderness.   Abdomen: Bowel sounds normal, soft, non-distended, nontender, no mass  Skin: Warm, Dry, No rash.   Back: No tenderness, No CVA tenderness.   Extremities: No clubbing, cyanosis, edema, no Homans or cords   Neurologic: Angolan-speaking.  Parent clear speech with normal cranial nerves aside from sensory disturbance over the right face.  Questionable weakness in the right upper extremity versus the left without drift.  Lower extremities normal.  Normal sensory of the extremities.  No ataxia.  Normal gait.  NIH is 1 for sensory disturbance  Psychiatric: Anxious appearing    RADIOLOGY/PROCEDURES  CT-HEAD W/O    (Results Pending)   MR-BRAIN-WITH & W/O    (Results Pending)        Imaging is interpreted by radiologist    Labs:  Results for orders placed or performed during the hospital encounter of 01/18/19   CBC WITH DIFFERENTIAL   Result Value Ref Range    WBC 4.5 (L) 4.8 - 10.8 K/uL    RBC 4.59 4.20 - 5.40 M/uL    Hemoglobin 13.8 12.0 - 16.0 g/dL    Hematocrit 39.7 37.0 - 47.0 %    MCV 86.5 81.4 - 97.8 fL    MCH 30.1 27.0 - 33.0 pg    MCHC 34.8 33.6 - 35.0 g/dL    RDW 39.7 35.9 - 50.0 fL    Platelet Count 136 (L) 164 - 446 K/uL    MPV 10.0 9.0 - 12.9 fL    Neutrophils-Polys 78.80 (H) 44.00 - 72.00 %    Lymphocytes 16.50 (L) 22.00 - 41.00 %    Monocytes 4.50 0.00 - 13.40 %    Eosinophils 0.00 0.00 - 6.90 %    Basophils 0.00 0.00 - 1.80 %    Immature Granulocytes 0.20 0.00 - 0.90 %    Nucleated RBC 0.00 /100 WBC    Neutrophils (Absolute) 3.53 2.00 - 7.15 K/uL     Lymphs (Absolute) 0.74 (L) 1.00 - 4.80 K/uL    Monos (Absolute) 0.20 0.00 - 0.85 K/uL    Eos (Absolute) 0.00 0.00 - 0.51 K/uL    Baso (Absolute) 0.00 0.00 - 0.12 K/uL    Immature Granulocytes (abs) 0.01 0.00 - 0.11 K/uL    NRBC (Absolute) 0.00 K/uL   COMP METABOLIC PANEL   Result Value Ref Range    Sodium 141 135 - 145 mmol/L    Potassium 3.7 3.6 - 5.5 mmol/L    Chloride 107 96 - 112 mmol/L    Co2 26 20 - 33 mmol/L    Anion Gap 8.0 0.0 - 11.9    Glucose 108 (H) 65 - 99 mg/dL    Bun 13 8 - 22 mg/dL    Creatinine 0.64 0.50 - 1.40 mg/dL    Calcium 9.0 8.5 - 10.5 mg/dL    AST(SGOT) 15 12 - 45 U/L    ALT(SGPT) 13 2 - 50 U/L    Alkaline Phosphatase 51 30 - 99 U/L    Total Bilirubin 0.5 0.1 - 1.5 mg/dL    Albumin 4.6 3.2 - 4.9 g/dL    Total Protein 6.9 6.0 - 8.2 g/dL    Globulin 2.3 1.9 - 3.5 g/dL    A-G Ratio 2.0 g/dL   HCG QUAL SERUM   Result Value Ref Range    Beta-Hcg Qualitative Serum Negative Negative   URINALYSIS CULTURE, IF INDICATED   Result Value Ref Range    Color Yellow     Character Clear     Specific Gravity 1.012 <1.035    Ph 8.0 5.0 - 8.0    Glucose Negative Negative mg/dL    Ketones 15 (A) Negative mg/dL    Protein Negative Negative mg/dL    Bilirubin Negative Negative    Urobilinogen, Urine 0.2 Negative    Nitrite Positive (A) Negative    Leukocyte Esterase Small (A) Negative    Occult Blood Negative Negative    Micro Urine Req Microscopic    URINE DRUG SCREEN   Result Value Ref Range    Amphetamines Urine Negative Negative    Barbiturates Negative Negative    Benzodiazepines Negative Negative    Cocaine Metabolite Negative Negative    Methadone Negative Negative    Opiates Negative Negative    Oxycodone Negative Negative    Phencyclidine -Pcp Negative Negative    Propoxyphene Negative Negative    Cannabinoid Metab Negative Negative   ESTIMATED GFR   Result Value Ref Range    GFR If African American >60 >60 mL/min/1.73 m 2    GFR If Non African American >60 >60 mL/min/1.73 m 2   URINE MICROSCOPIC (W/UA)    Result Value Ref Range    WBC 5-10 (A) /hpf    RBC 0-2 /hpf    Bacteria Many (A) None /hpf    Epithelial Cells Moderate (A) /hpf    Hyaline Cast 3-5 (A) /lpf   EKG   Result Value Ref Range    Report       [facility] Emergency Dept.    Test Date:  2019  Pt Name:    LEENA NEELY     Department: ER  MRN:        [id number]                      Room:        12  Gender:     Female                       Technician: [id number]  :        1985                   Requested By:SIERRA JAIME  Order #:    [id number]                    Reading MD: SIERRA JAIME MD    Measurements  Intervals                                Axis  Rate:       66                           P:          69  WY:         128                          QRS:        67  QRSD:       82                           T:          56  QT:         380  QTc:        399    Interpretive Statements  SINUS RHYTHM  PROBABLE LEFT ATRIAL ABNORMALITY  RSR' IN V1 OR V2, PROBABLY NORMAL VARIANT  Compared to ECG 2018 18:11:56  RSR' in V1 or V2 now present  Intraventricular conduction delay no longer present  Myocardial infarct finding no longer present    Electronically Signed On 2019 7:46:43 PST by  SIERRA JAIME MD         COURSE & MEDICAL DECISION MAKING  Patient presents with recurrent seizure activity.  She also has right facial paresthesias with subjective weakness of the right side of the body.  Her NIH is 1 and her symptoms are minimal.  She is not a TPA candidate or intervention candidate.  She has had neuroimaging in the past.  Because of the focal neurologic complaints she will require MRI of the brain and further assessment.   did order a CT scan of the head which is currently pending.  She will be admitted to the observation unit for further evaluation.  I consulted Dr. Rivera.      FINAL IMPRESSION  1.  Recurrent seizure activity  2.  Right facial paresthesias with right-sided body weakness  3.  Generalized body  aches      This dictation was created using voice recognition software. The accuracy of the dictation is limited to the abilities of the software.  The nursing notes were reviewed and certain aspects of this information were incorporated into this note.      Electronically signed by: Shiva Garcia, 1/18/2019 7:44 AM

## 2019-01-18 NOTE — PROGRESS NOTES
Pt arrived to unit via gurney at 0815. Pt oriented to room, unit, and plan of care.Ambulated to bed, stead gait. Pt Colombian speaking only; Translation provided by Colombian speaking tech; All questions answered at this time. Call light within reach; fall precautions in place.

## 2019-01-18 NOTE — ED TRIAGE NOTES
"Jennifer Alexander  33 y.o. female  Chief Complaint   Patient presents with   • Flu Like Symptoms     X 1 week. Pt states, \"My whole body hurts, from my head to toes. I feel like vomiting.\"   • Seizure     Pt reports daily seizures.        Pt amb to triage with steady gait for above complaint.    Pt takes  clonazePAM (KLONOPIN) 0.5 MG Tab Take 0.5 Tabs by mouth 2 times a day as needed for up to 30 days.   escitalopram (LEXAPRO) 20 MG tablet Take 1 Tab by mouth every day.   levetiracetam (KEPPRA) 750 MG tablet Take 1 Tab by mouth 2 Times a Day.   At home to treat seizures. Reports medication compliance.  Patient's last menstrual period was 01/04/2019.  Pt is alert and oriented, speaking in full sentences, follows commands and responds appropriately to questions. NAD. Resp are even and unlabored.  Pt placed in lobby. Pt educated on triage process. Pt encouraged to alert staff for any changes.    "

## 2019-01-18 NOTE — CARE PLAN
Problem: Communication  Goal: The ability to communicate needs accurately and effectively will improve  Outcome: PROGRESSING AS EXPECTED  This RN able to speak English and communicate with patient and family.

## 2019-01-18 NOTE — DISCHARGE PLANNING
Care Transition Team Assessment    Spoke with patient at bedside with translation assistance from Mariya MCKEON. Anticipate no needs @ present time. S.O. Will be ride @ D/C.     Information Source  Orientation : Oriented x 4  Information Given By: Patient  Informant's Name: Jennifer Ivye    Readmission Evaluation  Is this a readmission?: No    Interdisciplinary Discharge Planning  Does Admitting Nurse Feel This Could be a Complex Discharge?: No  Primary Care Physician: Margaret  Lives with - Patient's Self Care Capacity: Significant Other  Patient or legal guardian wants to designate a caregiver (see row info): No  Support Systems: Spouse / Significant Other  Do You Take your Prescribed Medications Regularly: Yes  Able to Return to Previous ADL's: Yes  Mobility Issues: No  Prior Services: None  Patient Expects to be Discharged to:: Home  Assistance Needed: No  Durable Medical Equipment: Not Applicable    Discharge Preparedness  What are your discharge supports?: Spouse  Prior Functional Level: Ambulatory    Functional Assesment  Prior Functional Level: Ambulatory    Finances  Prescription Coverage: Yes    Anticipated Discharge Information  Anticipated discharge disposition: Home  Discharge Address: [address] [place] [address]  Discharge Contact Phone Number: [phone]

## 2019-01-19 ENCOUNTER — PATIENT OUTREACH (OUTPATIENT)
Dept: HEALTH INFORMATION MANAGEMENT | Facility: OTHER | Age: 34
End: 2019-01-19

## 2019-01-19 VITALS
WEIGHT: 102.51 LBS | SYSTOLIC BLOOD PRESSURE: 118 MMHG | DIASTOLIC BLOOD PRESSURE: 67 MMHG | HEART RATE: 62 BPM | RESPIRATION RATE: 16 BRPM | OXYGEN SATURATION: 98 % | HEIGHT: 62 IN | TEMPERATURE: 98.6 F | BODY MASS INDEX: 18.86 KG/M2

## 2019-01-19 PROBLEM — F41.9 ANXIETY: Status: ACTIVE | Noted: 2019-01-19

## 2019-01-19 PROCEDURE — G0378 HOSPITAL OBSERVATION PER HR: HCPCS

## 2019-01-19 PROCEDURE — 99217 PR OBSERVATION CARE DISCHARGE: CPT | Performed by: HOSPITALIST

## 2019-01-19 PROCEDURE — 700102 HCHG RX REV CODE 250 W/ 637 OVERRIDE(OP): Performed by: NURSE PRACTITIONER

## 2019-01-19 PROCEDURE — A9270 NON-COVERED ITEM OR SERVICE: HCPCS | Performed by: NURSE PRACTITIONER

## 2019-01-19 RX ORDER — CLONAZEPAM 0.5 MG/1
0.25 TABLET ORAL 2 TIMES DAILY PRN
Qty: 30 TAB | Refills: 0 | Status: SHIPPED | OUTPATIENT
Start: 2019-01-19 | End: 2019-02-02

## 2019-01-19 RX ORDER — LEVETIRACETAM 1000 MG/1
1000 TABLET ORAL 2 TIMES DAILY
Qty: 60 TAB | Refills: 3 | Status: SHIPPED | OUTPATIENT
Start: 2019-01-19 | End: 2019-01-28 | Stop reason: SDUPTHER

## 2019-01-19 RX ADMIN — LEVETIRACETAM 1000 MG: 500 TABLET ORAL at 05:28

## 2019-01-19 ASSESSMENT — PAIN SCALES - GENERAL: PAINLEVEL_OUTOF10: 0

## 2019-01-19 NOTE — DISCHARGE SUMMARY
"Discharge Summary    CHIEF COMPLAINT ON ADMISSION  Chief Complaint   Patient presents with   • Flu Like Symptoms     X 1 week. Pt states, \"My whole body hurts, from my head to toes. I feel like vomiting.\"   • Seizure     Pt reports daily seizures.        Reason for Admission  Pain     Admission Date  1/18/2019    CODE STATUS  Full Code    HPI & HOSPITAL COURSE  33 y.o. female who presented 1/18/2019 with past medical history of seizure disorder depression presents to the emergency room with a complaint of general malaise and seizures daily ongoing for the last 7 days.. Patient had an abnormal EEG done in 2008 .  Since then she has been on some antiseizure medication.  As per the  she is been having daily seizures , tonic-clonic lasting for a few minutes ever since she cut 1 of her medication dosages in half. Review of the office notes from her primary care physician appears that the clonazepam was apparently cut in half.  She also complaining of a headache starting at the back of her right side of her head radiating towards the front, throbbing intensity 3 out of 10,.     She denies any history of migraine     She denies any history of trauma     No fever chills        During her hospital stay, she had no further seizures.  She had MRI of the brain and EEG done.  Neurology MD consulted    Case d/w neurology :    MR brain with and without contrast on 1/18/2019 personally reviewed with left hippocampal sclerosis     Routine EEG performed on 1/18/2019 was personally reviewed which showed temporal intermittent rhythmic delta activity which is a finding associated with an increased risk of seizure arising from the left temporal region and is also suggestive of a focal region of electrocerebral disturbance involving the white matter of the temporal lobe.       Patient instructed that keppra is for seizure prophylaxis and should be continued at the increased dose of 1000mg bid and the klonopin is for anxiety. She " should only use klonopin (current dosage 0.25mg)  as needed and not as a routine medication.    She should follow-up with outpatient psychiatry for her depression    Therefore, she is discharged in good and stable condition to home with close outpatient follow-up.    The patient recovered much more quickly than anticipated on admission.    Discharge Date  1/19    FOLLOW UP ITEMS POST DISCHARGE  Psychiatry  PCP  Neurology    DISCHARGE DIAGNOSES  Active Problems:    Seizure disorder (HCC) POA: Yes    Depression, major, recurrent, moderate (HCC) POA: Yes    Other headache syndrome POA: Yes    Anxiety POA: Yes  Resolved Problems:    * No resolved hospital problems. *      FOLLOW UP  Future Appointments  Date Time Provider Department Center   4/3/2019 8:20 AM MICHAEL Aguirre [facility] None   4/29/2019 4:00 PM JONY RuedaP.RLEONARDO [facility] None     MYA Rueda.P.RLEONARDO  [address] [place] [address]  [phone]    In 1 week        MEDICATIONS ON DISCHARGE     Medication List      CHANGE how you take these medications      Instructions   clonazePAM 0.5 MG Tabs  What changed:  reasons to take this  Commonly known as:  KLONOPIN   Take 0.5 Tabs by mouth 2 times a day as needed (anxiety) for up to 14 days.  Dose:  0.25 mg     levetiracetam 1000 MG tablet  What changed:  · medication strength  · how much to take  Commonly known as:  KEPPRA   Take 1 Tab by mouth 2 Times a Day.  Dose:  1000 mg        CONTINUE taking these medications      Instructions   escitalopram 20 MG tablet  Commonly known as:  LEXAPRO   Take 1 Tab by mouth every day.  Dose:  20 mg            Allergies  No Known Allergies    DIET  Orders Placed This Encounter   Procedures   • Diet Order Regular     Standing Status:   Standing     Number of Occurrences:   1     Order Specific Question:   Diet:     Answer:   Regular [1]       ACTIVITY  As tolerated.  Weight bearing as tolerated    CONSULTATIONS  Dr vaughn neurology    PROCEDURES  CLINICAL  DECISION UNIT UcheJennifer Horton  MRN: [id number], : 1985, Sex: F  Adm: 2019, D/C: --   Order   MR-BRAIN-WITH & W/O [[id number]] (Order [id number])   Patient Information     Patient Name  UcheJennifer Horton ([id number]) Sex  Female   1985   Room Bed Code Status Current Location   T207 00 FULL 00   Reprint Order Requisition     MR-BRAIN-WITH & W/O (Order #[id number]) on 19   Last Resulted Time     1:44 PM   Images     Show images for MR-BRAIN-WITH & W/O   Imaging Result Status     Status: Final result (Exam End: 2019  1:13 PM)   Imaging Previous Results     Open Hard Copy Result Report (Order #[id number] - MR-BRAIN-WITH & W/O)   Narrative       2019 12:40 PM    HISTORY/REASON FOR EXAM:  seizure.      TECHNIQUE/EXAM DESCRIPTION:   MRI of the brain without and with contrast.    T1 sagittal, T2 fast spin-echo axial, T1 coronal, FLAIR coronal, diffusion-weighted and apparent diffusion coefficient (ADC map) axial images were obtained of the whole brain. T1 postcontrast axial and T1 postcontrast coronal images were obtained.    The study was performed on a [employer] Signa 1.5 Chel MRI scanner.    7.5 mL Gadavist contrast was administered intravenously.    COMPARISON:  13    FINDINGS: There are volume loss and abnormal T2 signal involving left hippocampal formation consistent with hippocampal sclerosis. There is no intra-axial space-occupying lesion. There is no evidence of neuronal migrational abnormality. There is no   evidence of cortical dysplasia.    There is no acute infarct. There is no acute or chronic parenchymal hemorrhage. The visualized flow voids of the cerebral vasculature are unremarkable.  There is no large lesion identified in the expected course of the intracranial portions of the   cranial nerves.    The skull bones appear normal. There is minimal mucosal thickening in the left maxillary sinus. The extracranial soft tissue including orbits appear  grossly normal.    There is no abnormal parenchymal or meningeal contrast enhancement.   Impression       1.  Left hippocampal sclerosis. There has been no significant interval change.  2.  There is no evidence of cortical dysplasia or neuronal migrational abnormality     -----------------------------------------------------------------------  Clin Decision Unit [address] [id number]       Jennifer Alexander   MRN: [id number], : 1985 , Sex: F   Admit: 2019, D/C:           Beti Crockett M.D. Physician Signed Neurology  Procedures Date of Service: 2019 11:50 AM   Procedure Orders:   EEG [[id number]] ordered by Eric Osorio M.D. at 19 1150   Procedures:   EEG VIDEO 24 HR REDUCED [[id number] (Custom)]         []Hide copied text  EEG 19 4:55 PM     VIDEO ELECTROENCEPHALOGRAM / EPILEPSY MONITORING UNIT REPORT        Referring provider: DR OSORIO     DOS:   2019        INDICATION:  Jennifer Alexander 33 y.o. female presenting with Seizure     CURRENT ANTIEPILEPTIC REGIMEN: KEPPRA     DURATION: 28 minutes        TECHNIQUE: A 30-channel video electroencephalogram (VEEG) was performed in accordance with the international 10-20 system. This digital study was reviewed in bipolar and referential montages. The recording examined the patient during wakeful, drowsy and sleep states.            DESCRIPTION OF THE RECORD:  During the awake state, background shows 10-11 Hz alpha activity posteriorly with amplitude of 70 mV over right. The EEG background was attenuated over the left. There are intermittent epileptic spike/sharp and monomorphic delta over left. At times, spread to the right frontal  There was reactivity with eye opening/closure.  Normal anterior-posterior gradient was noted with faster beta frequencies seen anteriorly.  During drowsiness, high-amplitude delta frequencies were seen.     During the sleep state, background shows diffuse high-amplitude 4-5 Hz  delta activity.  Symmetrical high-amplitude sleep spindles and vertex sharp activities were seen in the central leads.     ACTIVATION PROCEDURES:   Hyperventilation was performed by the patient. The technician performing the test noted good effort. This technique produced electroencephalographic changes in keeping with the expected bilaterally synchronous, frontally predominant, high amplitude slow waves build up.      Intermittent Photic stimulation was  performed in a stepwise fashion from 1 to 30 Hz and elicited a normal response (photic driving), most noticeable in the posterior leads.        ICTAL AND/OR INTERICTAL FINDINGS:   No focal or generalized epileptiform activity was noted.  There are intermittent epileptic spike/sharp and monomorphic delta over left. At times, spread to the right frontal  No seizures were recorded during the study.      EVENT(S):  NONE     EKG: sampling review of EKG recording demonstrated regular rhythm        INTERPRETATION:         ________________________________________________________________________     This is abnormal routine video EEG recording in the awake and drowsy/sleep state(s).     This scalp EEG denotes                     1. Active focal cortical irritability / dysfunction over left temporal --this patten is consistent with left temporal lobe seizure     EEG 01/18/19 5:00 PM     ________________________________________________________________________  ________________________________________________________________________        REFERENCE     EEG is described as 'abnormal' (that is 'not normal' brain activity) does not 'prove' that the person has epilepsy and does not mean the patient needs treatment. ... Also, many people who do have epilepsy will only have 'abnormal' activity on the EEG if they have a seizure at the time the test is happening.      ________________________________________________________________________                            LABORATORY  [facility]  Results   Component Value Date    SODIUM 141 01/18/2019    POTASSIUM 3.7 01/18/2019    CHLORIDE 107 01/18/2019    CO2 26 01/18/2019    GLUCOSE 108 (H) 01/18/2019    BUN 13 01/18/2019    CREATININE 0.64 01/18/2019    CREATININE 0.8 12/04/2008        Lab Results   Component Value Date    WBC 4.5 (L) 01/18/2019    HEMOGLOBIN 13.8 01/18/2019    HEMATOCRIT 39.7 01/18/2019    PLATELETCT 136 (L) 01/18/2019        Total time of the discharge process exceeds 36 minutes.

## 2019-01-19 NOTE — PROGRESS NOTES
A/o,respirations are even and unlabored on room air , assessment completed, vital signs stable, pt complaining of  Generalized weakness,  IV fluids running per orders, updated communication board,  poc discussed and understood, verbalized understanding, box meal given, all questions answered at this time , fall precautions in place, call button within reach, will continue to monitor.

## 2019-01-19 NOTE — DISCHARGE INSTRUCTIONS
Discharge Instructions    Discharged to home by car with relative. Discharged via wheelchair, hospital escort: Yes.  Special equipment needed: Not Applicable    Be sure to schedule a follow-up appointment with your primary care doctor or any specialists as instructed.     Discharge Plan:   Diet Plan: Discussed  Activity Level: Discussed  Confirmed Follow up Appointment: Appointment Scheduled  Confirmed Symptoms Management: Discussed  Medication Reconciliation Updated: Yes  Influenza Vaccine Indication: Indicated: 9 to 64 years of age  Influenza Vaccine Given - only chart on this line when given: Influenza Vaccine Given (See MAR)    I understand that a diet low in cholesterol, fat, and sodium is recommended for good health. Unless I have been given specific instructions below for another diet, I accept this instruction as my diet prescription.   Other diet: Heart healthy    Special Instructions: None    · Is patient discharged on Warfarin / Coumadin?   No     Depression / Suicide Risk    As you are discharged from this [facility] facility, it is important to learn how to keep safe from harming yourself.    Recognize the warning signs:  · Abrupt changes in personality, positive or negative- including increase in energy   · Giving away possessions  · Change in eating patterns- significant weight changes-  positive or negative  · Change in sleeping patterns- unable to sleep or sleeping all the time   · Unwillingness or inability to communicate  · Depression  · Unusual sadness, discouragement and loneliness  · Talk of wanting to die  · Neglect of personal appearance   · Rebelliousness- reckless behavior  · Withdrawal from people/activities they love  · Confusion- inability to concentrate     If you or a loved one observes any of these behaviors or has concerns about self-harm, here's what you can do:  · Talk about it- your feelings and reasons for harming yourself  · Remove any means that you might use to hurt  yourself (examples: pills, rope, extension cords, firearm)  · Get professional help from the community (Mental Health, Substance Abuse, psychological counseling)  · Do not be alone:Call your Safe Contact- someone whom you trust who will be there for you.  · Call your local CRISIS HOTLINE 045-3986 or 532-311-9972  · Call your local Children's Mobile Crisis Response Team [place] (545) 147-8914 or www.[employer]  · Call the toll free National Suicide Prevention Hotlines   · National Suicide Prevention Lifeline 085-561-UELC (9560)  · [facility] [employer] Line Network 800-SUICIDE (556-7863)      Epilepsia  (Epilepsy)  La epilepsia es un trastorno en el que la persona tiene convulsiones repetidas con el paso del tiempo. Jens convulsión es jens liberación anormal de actividad eléctrica en el cerebro. Las convulsiones pueden provocar un cambio en la atención, la conducta o la capacidad de mantenerse despierto y alerta (estado mental alterado). Frecuentemente las convulsiones consisten en sacudidas incontrolables.   La mayoría de las personas con epilepsia tiene jens leticia normal. Sin embargo, las personas con esta afección tienen un mayor riesgo de sufrir caídas, accidentes y lesiones. Por lo tanto, es importante comenzar el tratamiento de inmediato.  CAUSAS   La epilepsia puede tener muchas causas posibles. Cualquier factor que perturbe el patrón normal de la actividad celular cerebral puede provocar convulsiones. Entre estos factores, se incluyen:   · Traumatismo en la esau  · Traumatismo en el nacimiento.  · Fiebre serafin en los niños.  · Ictus.  · Hemorragias en el cerebro o alrededor de mustapha.  · Determinados medicamentos.  · Nivel de oxígeno bajo, prolongado, rowan lo que ocurre después de los esfuerzos de resucitación cardiopulmonar (RCP).  · Desarrollo anormal del cerebro.  · Ciertas enfermedades, rowan meningitis, encefalitis (infección cerebral), malaria y otras infecciones.  · Desequilibrio de las sustancias  químicas que transportan señales a los nervios (neurotransmisores).  SIGNOS Y SÍNTOMAS   Los síntomas de jens convulsión pueden variar considerablemente de jens persona a otra. [place] antes de jens convulsión, puede tener jens advertencia (aura) que indica que el ataque está a punto de ocurrir. Un aura puede incluir los siguientes síntomas:  · Miedo o ansiedad.  · Náuseas.  · Sentir que la habitación da vueltas (vértigo).  · Cambios en la visión, rowan akshat destellos de rosalinda o manchas.  Los síntomas más comunes jose un ataque son:  · Sensaciones anormales, rowan un olor anormal o un sabor amargo en la boca.  · Entumecimiento corporal repentino y general.  · Convulsiones que implican sacudidas rítmicas de la nisa, el brazo o la pierna en mae o ambos lados.  · Cambio repentino en la conciencia.  ¨ Aparentar estar despierto, erendira no responder.  ¨ Aparentar estar dormido, erendira que no puedan despertarlo.  · Hacer muecas, masticar, hacer chasquidos con los labios, babear, morderse la lengua o perder el control de la vejiga o los intestinos.  Después de jens convulsión, puede ser que se sienta somnoliento jose un tiempo.   DIAGNÓSTICO   El médico le preguntará sobre adrian síntomas y hará jens historia clínica. Las descripciones de cualquier testigo de adrian convulsiones serán muy útiles en el diagnóstico. Es necesario un examen físico, incluido un examen neurológico detallado. Se pueden realizar varios estudios, por ejemplo:   · Electroencefalograma (EEG). Mustapha es un estudio indoloro de las ondas del cerebro. En mustapha estudio, se crea un diagrama de las ondas del cerebro. Un especialista puede interpretar estos diagramas.  · Resonancia magnética (RM) del cerebro.  · Tomografía computarizada (TC) del cerebro.  · Punción butcher (punción lumbar [PL]).  · Análisis de tulio para detectar signos de infección o bioquímica anormal de la tulio.  TRATAMIENTO   La epilepsia no tiene ethan, erendira en general es tratable. Jens vez que se  diagnostica la epilepsia, es importante comenzar un tratamiento lo antes posible. En la mayoría de las personas con epilepsia, las convulsiones pueden controlarse con medicamentos. También se puede utilizar lo siguiente:  · Se puede emplear un marcapasos del cerebro (estimulador del nervio vago) en las personas con convulsiones que no logran controlarse adecuadamente con medicamentos.  · Cirugía del cerebro.  En algunas personas, la epilepsia desaparece con el tiempo.  INSTRUCCIONES PARA EL CUIDADO EN EL HOGAR   ·  Siga las recomendaciones de gómez médico sobre la conducción de vehículos y la seguridad en las actividades normales.  · Descanse lo suficiente. La falta de sueño puede provocar convulsiones.  · [place] solo medicamentos de venta dave o recetados, según las indicaciones del médico. [place] los medicamentos recetados exactamente rowan se le indicó.  · Evite los desencadenantes conocidos de adrian convulsiones.  · Lleve un diario de adrian convulsiones. Registre lo que recuerda sobre jens convulsión, especialmente un posible desencadenante.  · Asegúrese de que las personas con las que vive o trabaja sepan que es propenso a sufrir convulsiones. Ellas deben recibir instrucciones sobre cómo ayudarlo. En general, un testigo de jens convulsión debe hacer lo siguiente:  ¨ Colocar un almohadón debajo de gómez esau y cuerpo.  ¨ Recostarlo sobre un lado.  ¨ Evitar inmovilizarlo innecesariamente.  ¨ No colocar nada dentro de gómez boca.  ¨ Solicite asistencia médica de emergencia si hay preguntas sobre lo que ocurrió.  · Concurra a las consultas de control con gómez médico según las indicaciones. Es posible que necesite análisis de tulio normales para controlar los niveles de gómez medicamento.  SOLICITE ATENCIÓN MÉDICA SI:   · Tiene signos de infección u otra enfermedad. [place] puede aumentar el riesgo de sufrir jens convulsión.  · Parece que tiene convulsiones más frecuentes.  · Gómez patrón de convulsiones cambia.  SOLICITE ATENCIÓN MÉDICA DE  INMEDIATO SI:   · Tiene jens convulsión que no se detiene después de unos momentos.  · Tiene jens convulsión que le provoca dificultad para respirar.  · Tiene jens convulsión que le ocasiona un dolor de esau muy intenso.  · Tiene jens convulsión que lo dario sin la capacidad de poder hablar o usar jens parte del cuerpo.  Esta información no tiene rowan fin reemplazar el consejo del médico. Asegúrese de hacerle al médico cualquier pregunta que tenga.  Document Released: 12/18/2006 Document Revised: 04/10/2017  [employer] Patient Education © 2017 [employer]    Levetiracetam tablets  ¿Qué es mustapha medicamento?  El LEVETIRACETAM es un medicamento antiepiléptico. Se utiliza con otros medicamentos para tratar ciertos tipos de convulsiones.  Mustapha medicamento puede ser utilizado para otros usos; si tiene alguna pregunta consulte con gómez proveedor de atención médica o con gómez farmacéutico.  MARCAS COMUNES: Keppra, Roweepra  ¿Qué le jose informar a mi profesional de la jose manuel antes de anna mustapha medicamento?  Necesita saber si usted presenta alguno de los siguientes problemas o situaciones:  -enfermedad renal  -ideas suicidas, planes o intento; si usted o alguien de gómez margaret ha intentado un suicidio previo  -jens reacción alérgica o inusual al levetiracetam, a otros medicamentos, alimentos, colorantes o conservantes  -si está embarazada o buscando quedar embarazada  -si está amamantando a un bebé  ¿Cómo jose utilizar mustapha medicamento?  [place] mustapha medicamento por vía oral con un vaso de agua. Siga las instrucciones de la etiqueta del medicamento. [place] las tabletas enteras. No triture o mastique mustapha medicamento. Mustapha medicamento se puede anna con o sin alimentos. [place] adrian dosis a intervalos regulares. No tome gómez medicamento con jens frecuencia mayor que la indicada. No deje de anna mustapha medicamento ni ninguno de adrian medicamentos para las convulsiones a menos que así lo indique gómez médico o gómez profesional de la jose manuel. Si  suspende repentinamente gómez medicamento, el número de convulsiones o gómez gravedad puede aumentar.  Gómez farmacéutico le dará jens [place] del medicamento especial con cada receta y relleno. Asegúrese de leer esta información cada vez cuidadosamente.  Consulte a gómez pediatra o profesional de la jose manuel acerca del uso de mustapha medicamento en niños. Aunque mustapha medicamento se puede recetar a niños tan menores rowan de 4 años de edad para condiciones selectivas, las precauciones se aplican.  Sobredosis: Póngase en contacto inmediatamente con un centro toxicológico o jens jeannette de urgencia si usted geno que haya tomado demasiado medicamento.  ATENCIÓN: Mustapha medicamento es solo para usted. No comparta mustapha medicamento con nadie.  ¿Qué sucede si me olvido de jens dosis?  Si olvida jens dosis, tómela lo antes posible. Si es phill la hora de la próxima dosis, tome sólo margarita dosis. No tome dosis adicionales o dobles.  ¿Qué puede interactuar con mustapha medicamento?  Esta medicina puede interactuar con los siguientes medicamentos:  -carbamazepina  -colesevelam  -probenecida  -sevelamer  Puede ser que esta lista no menciona todas las posibles interacciones. Informe a gómez profesional de la jose manuel de todos los productos a base de hierbas, medicamentos de venta dave o suplementos nutritivos que esté tomando. Si usted fuma, consume bebidas alcohólicas o si utiliza drogas ilegales, indíqueselo también a gómez profesional de la jose manuel. Algunas sustancias pueden interactuar con gómez medicamento.  ¿A qué jose estar atento al usar mustapha medicamento?  Visite a gómez médico o a gómez profesional de la jose manuel para chequear gómez evolución periódicamente. Use jens pulsera o collar de identificación médica que indique que tiene epilepsia y lleve jens tarjeta que indique todos adrian medicamentos.  Es importante que tome mustapha medicamento exactamente rowan gómez profesional de la jose manuel le haya indicado. Al comenzar el tratamiento, es posible que gómez médico deba ajustar gómez dosis. Es posible  que transcurran semanas o meses hasta que gómez dosis se estabilice. Debe comunicarse con gómez médico o con gómez profesional de la jose manuel si adrian convulsiones empeoran o si experimenta un nuevo tipo de convulsiones.  Puede experimentar somnolencia o mareos. No conduzca ni utilice maquinaria ni chante nada que le exija permanecer en estado de alerta hasta que sepa cómo le afecta emmie medicamento. No se siente ni se ponga de pie con rapidez, especialmente si es un paciente de edad avanzada. [place] reduce el riesgo de mareos o desmayos. El alcohol puede interferir con el efecto de emmie medicamento. Evite consumir bebidas alcohólicas.  El uso de emmie medicamento puede aumentar la posibilidad de tener ideas o comportamiento suicida. Presta atención a rowan usted responde al medicamento mientras esté usándolo. Cualquier empeoramiento de humor o ideas de suicidio o de morir, informe a gómez profesional de la jose manuel inmediatamente.  Las mujeres que se encuentran embarazadas mientras usan emmie medicamento pueden inscribirse en el registro del [place] American Antiepileptic Drug Pregnancy Registry (Registro estadounidense de Embarazo de Medicamentos Antiepilépticos) llamando al teléfono [phone]. Emmie registro recoge información acerca de la seguridad del uso de medicamentos antiepilépticos jose el embarazo.  ¿Qué efectos secundarios puedo tener al utilizar emmie medicamento?  Efectos secundarios que debe informar a gómez médico o a gómez profesional de la jose manuel tan pronto rowan sea posible: reacciones alérgicas, rowan erupción cutánea, picazón o urticarias, e hinchazón de la nisa, los labios o la lengua problemas respiratorios orina oscura sensación general de estar enfermo o síntomas gripales problemas de equilibrio, del habla, al caminar cansancio o debilidad inusual empeoramiento del estado de ánimo, ideas o acciones de suicidio o muerte color amarillento de los ojos o la pielEfectos secundarios que generalmente no requieren atención médica  (infórmelos a gómez médico o a gómez profesional de la jose manuel si persisten o si son molestos): diarrea mareo, somnolencia dolor de esau pérdida del apetito  Puede ser que esta lista no menciona todos los posibles efectos secundarios. Comuníquese a gómez médico por asesoramiento médico sobre los efectos secundarios. Usted puede informar los efectos secundarios a la FDA por teléfono al [phone].  ¿Dónde jose guardar mi medicina?  [place] fuera del alcance de los niños.  Guárdela a temperatura ambiente, entre 15 y 30 grados C (59 y 86 grados F). Deseche todo el medicamento que no haya utilizado, después de la fecha de vencimiento.  ATENCIÓN: Emmie folleto es un resumen. Puede ser que no cubra toda la posible información. Si usted tiene preguntas acerca de esta medicina, consulte con gómez médico, gómez farmacéutico o gómez profesional de la jose manuel.  © 2018 Elsevier[employer] Standard (2017-04-03 00:00:00)    Clonazepam tablets  ¿Qué es emmie medicamento?  El CLONAZEPAM es jens benzodiacepina. Se utiliza para el tratamiento de ciertos tipos de convulsiones. También se usa para el tratamiento de trastorno de pánico.  Emmie medicamento puede ser utilizado para otros usos; si tiene alguna pregunta consulte con gómez proveedor de atención médica o con gómez farmacéutico.  MARCAS COMUNES: Ceberclon, Klonopin  ¿Qué le jose informar a mi profesional de la jose manuel antes de anna emmie medicamento?  Necesitan saber si usted presenta alguno de los siguientes problemas o situaciones: problema de abuso de drogas o alcohol trastorno bipolar, depresión, psicosis u otro problema de jose manuel mental glaucoma enfermedad renal o hepática enfermedad pulmonar o respiratoria miastenia grave enfermedad de Parkinson morales convulsiones o antecedentes de convulsiones ideas suicidas jens reacción alérgica o inusual al clonazepam, a otras benzodiazepinas, alimentos, colorantes o conservantes si está embarazada o buscando quedar embarazada si está amamantando a un bebé  ¿Cómo  jose utilizar mustapha medicamento?  [place] mustapha medicamento por vía oral con un vaso de agua. Siga las instrucciones de la etiqueta del medicamento. Si el medicamento le produce malestar estomacal, tómelo con alimentos o con leche. [place] adrian dosis a intervalos regulares. No tome gómez medicamento con jens frecuencia mayor a la indicada. No deje de tomarlo ni cambie la dosis excepto si así lo indica gómez médico o gómez profesional de la jose manuel.  Gómez farmacéutico le dará jens Guía del medicamento especial con cada receta y relleno. Asegúrese de leer esta información cada vez cuidadosamente.  Hable con gómez pediatra para informarse acerca del uso de mustapha medicamento en niños. Puede requerir atención especial.  Sobredosis: Póngase en contacto inmediatamente con un centro toxicológico o jens jeannette de urgencia si usted geno que haya tomado demasiado medicamento.  ATENCIÓN: Mustapha medicamento es solo para usted. No comparta mustapha medicamento con nadie.  ¿Qué sucede si me olvido de jens dosis?  Si olvida jens dosis, tómela lo antes posible. Si es phill la hora de la próxima dosis, tome sólo margarita dosis. No tome dosis adicionales o dobles.  ¿Qué puede interactuar con mustapha medicamento?  -suplementos dietéticos o a base de hierbas  -medicamentos para la depresión, ansiedad o trastornos psiquiátricos  -medicamentos para infecciones micóticas, tales rowan fluconazol, itraconazol, quetoconazol o voriconazol  -medicamentos para el tratamiento de las infecciones por VIH o SIDA  -medicamentos para conciliar el sueño  -analgésicos recetados  -propantelina  -rifampicina  -sevelamer  -algunos medicamentos para las convulsiones, tales rowan carbamazepina, fenobarbital, fenitoína o primidona  Puede ser que esta lista no menciona todas las posibles interacciones. Informe a gómez profesional de la jose manuel de todos los productos a base de hierbas, medicamentos de venta dave o suplementos nutritivos que esté tomando. Si usted fuma, consume bebidas alcohólicas o si utiliza  drogas ilegales, indíqueselo también a gómez profesional de la jose manuel. Algunas sustancias pueden interactuar con gómez medicamento.  ¿A qué jose estar atento al usar mustapha medicamento?  Informe a gómez médico o a gómez profesional de la jose manuel si adrian síntomas no comienzan a mejorar o si empeoran.  No deje de tomarlo, excepto si así lo indica gómez médico. Usted puede desarrollar jens reacción grave. Gómez médico le dirá cuánto medicamento anna.  Puede experimentar somnolencia o mareos. No conduzca, no utilice maquinaria ni chante nada que le exija permanecer en estado de alerta hasta que sepa cómo le afecta mustapha medicamento. Para reducir el riesgo de mareos y desmayos, no se ponga de pie ni se siente con rapidez, especialmente si es un paciente de edad avanzada. El alcohol puede aumentar los mareos y la somnolencia. Evite consumir bebidas alcohólicas.  Si está tomando otro medicamento que también causa somnolencia, es posible que tenga más efectos secundarios. Entréguele a gómez proveedor de atención médica jens lista de todos los medicamentos que usa. Gómez médico le dirá cuánto medicamento anna. No tome más medicamento que lo indicado. Llame al servicio de emergencias para recibir ayuda si tiene problemas para respirar o somnolencia inusual.  El uso de mustapha medicamento puede aumentar la posibilidad de ideas o comportamiento suicida. Preste atención a cómo usted responde mientras esté usando mustapha medicamento. Debe informar a gómez profesional de la jose manuel inmediatamente sobre cualquier empeoramiento de gómez estado de ánimo, o ideas de suicidio o de morir.  ¿Qué efectos secundarios puedo tener al utilizar mustapha medicamento?  Efectos secundarios que debe informar a gómez médico o a gómez profesional de la jose manuel tan pronto rowan sea posible:  -reacciones alérgicas rowan erupción cutánea, picazón o urticarias, hinchazón de la nisa, labios o lengua  -cambios en la visión  -confusión  -depresión  -alucinaciones  -cambios de humor, excitabilidad o comportamiento  agresivo  -dificultades con los movimientos, sensación de vértigo o movimientos entrecortados  -calambres musculares o debilidad  -temblores  -movimientos anormales de los ojos  Efectos secundarios que, por lo general, no requieren atención médica (debe informarlos a gómez médico o a gómez profesional de la jose manuel si persisten o si son molestos):  -estreñimiento o diarrea  -dificultad para conciliar el sueño, pesadillas  -mareos, somnolencia,  -dolor de esau  -mayor salivación de la boca  -náuseas, vómito  Puede ser que esta lista no menciona todos los posibles efectos secundarios. Comuníquese a gómez médico por asesoramiento médico sobre los efectos secundarios. Usted puede informar los efectos secundarios a la FDA por teléfono al 1-800FDA-4184.  ¿Dónde jose guardar mi medicina?  [place] fuera del alcance de los niños. Emmie medicamento puede ser abusado. Mantenga gómez medicamento en un lugar seguro para protegerlo contra robos. No comparta emmie medicamento con nadie. Es peligroso  o regalar emmie medicamento y está prohibido por la pedro.  [place]árdela a temperatura ambiente, entre 15 y 30 grados C (59 y 86 grados F). Protéjala andrew. Mantenga el envase angela cerrado. Deseche todo el medicamento que no haya utilizado, después de la fecha de vencimiento.  ATENCIÓN: [place] folleto es un resumen. Puede ser que no cubra toda la posible información. Si usted tiene preguntas acerca de esta medicina, consulte con gómez médico, gómez farmacéutico o gómez profesional de la jose manuel.  © 2018 Elsevier/[employer] Standard (2017-07-28 00:00:00)

## 2019-01-19 NOTE — PROGRESS NOTES
Assumed pt care at 0700. Received report from Haley MCKEON. A&O x4, Estonian speaking only, translation assistance provided by [employer]. Pt denies pain at this time. Respirations even and unlabored on RA.   Updated on POC, communication board updated. Bed locked and in lowest position. Call light and belongings within reach. Non-skid socks in place. Needs met, will continue to monitor.

## 2019-01-19 NOTE — PROGRESS NOTES
Pt DC'd. IV removed, discharge instructions provided to patient and spouse with translation provided by [employer], pt and spouse verbalize understanding. Pt tearful at this time, concerned of diagnosis, reports checking diagnosis on [employer] and being worried about findings, reassured pt that diagnosis is manageable if she keeps up on appointments and takes medications as directed. Pt states all questions have been answered. Copy of discharge paperwork provided to pt, signed copy in chart. Pt states all belongings in possession. Pt ambulated off unit with spouse, denied need for WC or hospital escort.

## 2019-01-28 ENCOUNTER — OFFICE VISIT (OUTPATIENT)
Dept: NEUROLOGY | Facility: MEDICAL CENTER | Age: 34
End: 2019-01-28

## 2019-01-28 VITALS
HEIGHT: 63 IN | TEMPERATURE: 96.9 F | OXYGEN SATURATION: 98 % | RESPIRATION RATE: 14 BRPM | WEIGHT: 106 LBS | DIASTOLIC BLOOD PRESSURE: 70 MMHG | SYSTOLIC BLOOD PRESSURE: 118 MMHG | BODY MASS INDEX: 18.78 KG/M2 | HEART RATE: 56 BPM

## 2019-01-28 DIAGNOSIS — G93.81 HIPPOCAMPAL SCLEROSIS: ICD-10-CM

## 2019-01-28 DIAGNOSIS — Z13.31 SCREENING FOR DEPRESSION: ICD-10-CM

## 2019-01-28 DIAGNOSIS — Z30.09 ENCOUNTER FOR COUNSELING REGARDING CONTRACEPTION: ICD-10-CM

## 2019-01-28 DIAGNOSIS — G40.109 LOCALIZATION-RELATED EPILEPSY (HCC): ICD-10-CM

## 2019-01-28 DIAGNOSIS — F39 MOOD DISORDER (HCC): ICD-10-CM

## 2019-01-28 DIAGNOSIS — Z30.09 ENCOUNTER FOR FEMALE FAMILY PLANNING COUNSELING: ICD-10-CM

## 2019-01-28 DIAGNOSIS — G40.109 TEMPORAL LOBE EPILEPSY (HCC): ICD-10-CM

## 2019-01-28 DIAGNOSIS — E55.9 VITAMIN D DEFICIENCY: ICD-10-CM

## 2019-01-28 DIAGNOSIS — R41.3 MEMORY LOSS: ICD-10-CM

## 2019-01-28 DIAGNOSIS — Z09 HOSPITAL DISCHARGE FOLLOW-UP: ICD-10-CM

## 2019-01-28 PROCEDURE — 99215 OFFICE O/P EST HI 40 MIN: CPT | Performed by: NURSE PRACTITIONER

## 2019-01-28 RX ORDER — LEVETIRACETAM 1000 MG/1
1000 TABLET ORAL 2 TIMES DAILY
Qty: 60 TAB | Refills: 11 | Status: SHIPPED | OUTPATIENT
Start: 2019-01-28 | End: 2019-07-26 | Stop reason: SDUPTHER

## 2019-01-28 NOTE — PROGRESS NOTES
Chief Complaint   Patient presents with   • New Patient       Problem List Items Addressed This Visit     None      Visit Diagnoses     Localization-related epilepsy (HCC)        Relevant Medications    levetiracetam (KEPPRA) 1000 MG tablet    Other Relevant Orders    KEPPRA    Temporal lobe epilepsy (HCC)        Relevant Medications    levetiracetam (KEPPRA) 1000 MG tablet    Hippocampal sclerosis        Vitamin D deficiency        Relevant Orders    VITAMIN D,25 HYDROXY    Screening for depression        Hospital discharge follow-up        Mood disorder (HCC)        Relevant Orders    REFERRAL TO PSYCHOLOGY    REFERRAL TO PSYCHIATRY    Encounter for female family planning counseling        Encounter for counseling regarding contraception              History of present illness:  Jennifer Alexander 33 y.o. female presents today with  for seizure evaluation. Both are Croatian speakers and today's conversation in the office was with the help of a  via ipad provided by the office.     She was seen in the ED on 1/18/2019 as she reports having daily spells. She was sent home with Keppra 1000mg BID. No more spells since.     Her spell started in 2004, she delivered her baby and she had a sz. She was at the hospital then, and was not given any medication. According to , pt will tense up first then arms will start to shake, lower extremities are stiff. No eye rolling, no bladder or bowel incontinence, no tongue biting. LOC with confusion after. It takes at least 30mins to gain consciousness. Pt reports feeling that her body will tense up and she feels nauseated prior to these spells. She feels very tired and weak post-ictal. Denies headache. Frequency was every month at first but now it's becoming more often even at night when she's sleeping. Per , it would last 3-4 mins.     They moved here in [place] in 2008. She was admitted in the hospital in 2008 for seizures. They Rxd her AED  that they don't remember the name anymore. They didn't see any benefit from that medication. No side effects. She has seen neurologist in the past here in [place] but does not remember the name. They don't know if she was put on another medication after that. She used to take 750mg of Keppra given by ED back in Sept 2018.    Denies side effects from Keppra but is suffering from depression since 2004. This has debilitated her and she stopped working. They requested to see both psychologist and psychiatrist.     She is having trouble with memory.     Not driving. Has never driven before.     No hx of TBI. No family hx of seizure.     Not taking Vit D    Sexually active. Not using contraception.  using condom. They don't want to have another baby. Refused referral to GYN for counseling for contraception.        Seizure onset: 2004    Seizure semiology: stiffening then convulsion. GTC?      Seizure types: focal onset with generalization    Last seizure: 1/18/2019    Past AED’s: Keppra 750mg BID, others unknown.     Current AED regimen: Keppra 1000mg BID.     Prior neurologists: yes    Prior EEG’s: yes    Prior brain imaging: yes    Driving status: no    School/work status: no       Past medical history:   Past Medical History:   Diagnosis Date   • Anemia 1/30/2013   • Anxiety 1/19/2019   • Depression, major, recurrent, moderate (HCC) 9/26/2018   • Epilepsy associated with specific stimuli (HCC)    • Head ache    • Seizure (HCC)        Past surgical history:   History reviewed. No pertinent surgical history.    Family history:   Family History   Problem Relation Age of Onset   • Hypertension Father        Social history:   Social History     Social History   • Marital status: Single     Spouse name: N/A   • Number of children: N/A   • Years of education: N/A     Occupational History   • Not on file.     Social History Main Topics   • Smoking status: Never Smoker   • Smokeless tobacco: Never Used   • Alcohol use No  "  • Drug use: No   • Sexual activity: Yes     Partners: Male     Other Topics Concern   • Not on file     Social History Narrative    ** Merged History Encounter **            Current medications:   Current Outpatient Prescriptions   Medication   • levETIRAcetam (KEPPRA) 1000 MG tablet   • clonazePAM (KLONOPIN) 0.5 MG Tab   • escitalopram (LEXAPRO) 20 MG tablet     No current facility-administered medications for this visit.        Medication Allergy:  No Known Allergies      Review of systems:     General: Denies fevers or chills, or nightsweats, or generalized fatigue.    Head: Denies headaches or dizziness or lightheadedness  EENT: Denies vision changes, vision loss or pain, nasal secretion, nasal bleeding, difficulty swallowing, hearing loss, tinnitus, vertigo, ear pain  Endocdrinologic: Denies sweating, cold or heat intolerance. No polyuria or polydipsia.   Respiratory: Denies shortness of breath, cough, sputum, or wheezing  Cardiac: Denies chest pain, palpitations, edema or syncope  Gastrointestinal: Denies nausea, vomiting, no abdominal pain or change in bowel habits, no melena or hematochezia  Urinary: Denies dysuria, frequency, hesitancy, or incontinence.  Dermatologic:  Denies new rash  Musculoskeletal: Denies muscle pain or swelling, no atrophy, no neck and back pain or stiffness.   Neurologic: Denies facial droopiness, muscle weakness (focal or generalized), paresthesias, ataxia, change in speech or language, memory loss  Psychiatric: Denies mood swings, suicidal or homicidal thoughts       Physical examination:   Vitals:    01/28/19 1258   BP: 118/70   BP Location: Left arm   Patient Position: Sitting   BP Cuff Size: Adult   Pulse: (!) 56   Resp: 14   Temp: 36.1 °C (96.9 °F)   TempSrc: Temporal   SpO2: 98%   Weight: 48.1 kg (106 lb)   Height: 1.6 m (5' 3\")     General: Patient in no acute distress, pleasant and cooperative.  HEENT: Normocephalic, no signs of acute trauma.   moist conjunctivae. Nares are " patent. Oropharynx clear without lesions and normal  hard and soft palates.   Neck: Supple, No carotid bruits. There is normal range of motion. No lymphadenopathy  Resp: clear to auscultation bilaterally. No wheezes or crackles.   CV: RRR, no murmurs.   Abdomen: normoactive bowel sounds, soft, non distended or tender.   Skin: no signs of acute rashes or trauma.   Musculoskeletal: joints exhibit full range of motion, without any pain to palpation. There are no signs of joint or muscle swelling. There is no tenderness to deep palpation of muscles.   Psychiatric: No hallucinatory behavior. No symptoms of depression or suicidal ideation. Mood and affect appear normal on exam.     NEUROLOGICAL EXAM:   Mental status, orientation: Awake, alert and fully oriented.   Speech and language: speech is clear and fluent. The patient is able to name, repeat and comprehend.   Memory: There is intact recollection of recent and remote events.   Cranial nerve exam:   CN I: Not examined   CN II: PERRL.   CN III, IV, VI: EOMI; no nystagmus   CN V: Facial sensation intact bilaterally   CN VII: face symmetric   CN VIII: hearing intact to finger rub bilaterally   CN IX, X: palate elevates symmetrically   CN XI: Symmetric shoulder shrug  CN XII: tongue midline. No signs of tongue biting or fasciculations   Motor exam: Strength is 5/5 in all extremities. Tone is normal. No abnormal movements were seen on exam.   Sensory exam reveals normal sense of light touch, in all extremities.   Deep tendon reflexes:  2+ throughout. Plantar responses are flexor. There is no clonus.   Coordination: shows a normal finger-nose-finger. Normal rapidly alternating movements.   Gait: The patient was able to get up from seated position on first attempt without requiring assistance. Found to be steady when walking. Movements were fluid with normal arm swing. The patient was able to turn without difficulties or tendency to fall. Romberg exam unremarkable.        ANCILLARY DATA REVIEWED:       Lab Data Review:  Reviewed in chart. CBC, CMP, UA, drug screen, Hcg    Records reviewed:   Reviewed in chart.    Imaging:   MRI brain w &w/o, 1/18/2019  1.  Left hippocampal sclerosis. There has been no significant interval change.  2.  There is no evidence of cortical dysplasia or neuronal migrational abnormality.    CT head, 4/15/17  Negative unenhanced CT of the brain.    EEG:  VEEG 1/18/19  This is abnormal routine video EEG recording in the awake and   drowsy/sleep state(s).  This scalp EEG denotes   1. Active focal cortical irritability / dysfunction over left   temporal --this patten is consistent with left temporal lobe   seizure        ASSESSMENT AND PLAN:    1. Localization-related epilepsy (HCC)  - levetiracetam (KEPPRA) 1000 MG tablet; Take 1 Tab by mouth 2 Times a Day.  Dispense: 60 Tab; Refill: 11  - KEPPRA; Future    2. Temporal lobe epilepsy (HCC)    3. Hippocampal sclerosis    4. Vitamin D deficiency  - VITAMIN D,25 HYDROXY; Future    5. Screening for depression    6. Hospital discharge follow-up    7. Mood disorder (HCC)  - REFERRAL TO PSYCHOLOGY  - REFERRAL TO PSYCHIATRY    8. Encounter for female family planning counseling    9. Encounter for counseling regarding contraception    10. Memory loss          CLINICAL DISCUSSION:  Left temporal lobe epilepsy with left hippocampal sclerosis based on recent EEG and MRI brain.   Pt started having GTC spells after delivery in 2004 in [place]. She was not prescribed any medication there. They moved to [place] in 2008, had another seizure while pregnant in 2008. She was put on an AED (don't remember name) after delivery. They did not believe it was beneficial to she was switched to another med (unknown name). She continued to have sz that in Sept 2018, she was Rx Keppra after ER visit here in [facility]. Spells continue to occur. Her last spell was 1/18/2019. Her Keppra was increased to 1000mg BID and they report of no seizures  since.     Memory issues could be from both epilepsy and depression.       Plan:  1. Referral to psychiatry and psychology for chronic depression. Aware that Keppra could worsen depression. Not suicidal or homicidal.     2. Continue with Keppra 1000mg BID. They don't want to change medication as this is working. They will call office to notify us about her previous AED's. They will entertain the thought of switching if they think the Keppra is not a good medication for her. Aware of breakthrough seizure with medication/dose adjustment.     3. Lab work in 3-4 months: Keppra trough and Vit D level.     4. Recommended Vit D daily.    5. Possibly doing 24-hour amb EEG/ 5day EMU stay in the future. Pt does not have insurance. She is a good candidate for surgery with good seizure outcome. Refused any further testing for now.     5. Recommended dual contraception preferably IUD and condom for . Pt does not plan on conceiving as of now.  She/partner aware of the risk of pregnancy complications while taking AED's which include congenital defects, abnormal fetal growth, , etc. They refused referral to GYN for contraception counseling.     6. Recommended no driving. Pt has never driven before. Will continue to do so.         FOLLOW-UP:   Return in about 6 months (around 2019).      EDUCATION AND COUNSELING:  -Education was provided to the patient and/or family regarding diagnosis and prognosis. The chronic and unpredictable nature of the condition were discussed. There is increased risk for additional events, which may carry potential for significant injuries and death. Discussed frequent seizure triggers: sleep deprivation, medication non-compliance, use of illegal drugs/alcohol, stress, and others.   -We reviewed in detail the current antiepileptic regimen. Potential side effects of antiepileptics were discussed at length, including but no limited to: hypersensitivity reactions (rash and others, some of  which can be fatal), visual field changes (some of which may be irreversible), glaucoma, diplopia, kidney stones, osteopenia/osteoporosis/bone fractures, hyperthermia/anhydrosis, hyponatremia, tremors/abnormal movements, ataxia, dizziness, fatigue, increased risk for falls, risk for cardiac arrhythmias/syncope, gastrointestinal side effects(hepatitis, pancreatitis, gastritis, ulcers), gingival hypertrophy/bleeding, drowsiness, sedation, anxiety/nervousness, increased risk for suicide, increased risk for depression, and psychosis.   -We also reviewed drug-drug interactions and their potential effect on seizure control and medication side effects.    -We also discussed in detail potential effects of seizures, epilepsy, and medications during pregnancy, including but not limited to fetal malformations, child developmental/intellectual disability, fetal/ risk for hemorrhages, stillbirth, maternal death, premature birth, and others. The patient/family aware that pregnancy should be avoided, unless desired, in which case we recommend discussing with us at least a year prior to planned conception. To avoid undesired pregnancy while on antiepileptics, we recommend dual contraception.   -Folic acid 2 mg is recommended for all females in childbearing age (12-44 years of age).   -Recommend chronic vitamin D supplementation and regular exercise (if not contraindicated).   -Patient/family educated on risk for SUDEP (Sudden Death in Epilepsy). Counseling was provided on the importance of strict medication and follow up compliance. The patient/family understand the risks associated with non-adherence with the medical plan as outlined, including but not limited to an increased risk for breakthrough seizures, which may contribute to injuries, disability, status epilepticus, and even death.   -Counseling was also provided on potential effects of alcohol and other drugs, which may lower seizure threshold and/or affect the  metabolism of antiepileptic drugs. We recommend avoidance of alcohol and illegal drugs.  -Avoid sleep deprivation.   -We extensively discussed the aspects related to safety in drivers who suffer from epilepsy. The patient is encourage to report to the Division of Motor Vehicles of any condition and/or spells related to confusion, disorientation, and/or loss of awareness and/or loss of consciousness; as these may pose a safety issue if they occur while operating a motor vehicle. The patient and/or family are ultimately responsible for exercising caution and abiding to regulations in place.   -Other seizure precautions were discussed at length, including no diving, no skydiving, no climbing or exposure to unprotected heights, no unsupervised swimming, no Jacuzzi or bathing in bathtubs or deep bodies of water. The patient/family have been advised about risks for operating any machinery while suffering from seizures / syncope / epilepsy and/or while taking antiepileptic drugs.   -The patient understands and agrees that due to the complexity of his/her diagnosis, results of any testing and further recommendations will typically be discussed/made during a face to face encounter in my office. The patient and/or family further understands it is their responsibility to keep proper follow up.     Patient/family agree with plan, as outlined.       Aylin Cheatham, MSN, APRN, FNP-C  [facility]  Office: [phone]  Fax: [phone]    BILLING DOCUMENTATION:   (a) Counseling:  I spent greater than 50% time face-to-face time of a total of 63 mins visit. Over 50% of the time of the visit today was spent on counseling and or coordination of care wtih the patient/family, with greater than 50% of the total time discussing:   o Diagnostic results, impressions, and/or recommended diagnostic studies, and coordination of care.   o Prognosis.  o Treatment recommendations, including risks, benefits, &  alternatives.  o Instructions for treatment/management and/or follow-up.  o Importance of compliance with chosen treatment/management options.  o Risk factor modification.   o Patient & family education.  o Provided business card and/or instructions for follow-up & emergencies.

## 2019-04-03 ENCOUNTER — APPOINTMENT (OUTPATIENT)
Dept: NEUROLOGY | Facility: MEDICAL CENTER | Age: 34
End: 2019-04-03

## 2019-06-24 ENCOUNTER — TELEPHONE (OUTPATIENT)
Dept: NEUROLOGY | Facility: MEDICAL CENTER | Age: 34
End: 2019-06-24

## 2019-06-24 RX ORDER — ERGOCALCIFEROL 1.25 MG/1
50000 CAPSULE ORAL
Qty: 4 CAP | Refills: 5 | Status: SHIPPED
Start: 2019-06-24 | End: 2020-01-13

## 2019-06-24 NOTE — TELEPHONE ENCOUNTER
Please notify pt to stop taking daily OTC vit D. Rx sent to pharmacy. Take 1 tab weekly for 6 months. Thanks     STACEY    I spoke with pt daughter, pt does not speak english. Daughter verbally understood and translated the information to pt over the phone.

## 2019-06-26 ENCOUNTER — TELEPHONE (OUTPATIENT)
Dept: NEUROLOGY | Facility: MEDICAL CENTER | Age: 34
End: 2019-06-26

## 2019-07-25 NOTE — PROGRESS NOTES
Chief Complaint   Patient presents with   • Follow-Up     Localization-related epilepsy        Problem List Items Addressed This Visit     None      Visit Diagnoses     Localization-related epilepsy (HCC)        Relevant Medications    levetiracetam (KEPPRA) 1000 MG tablet    Temporal lobe epilepsy (HCC)        Relevant Medications    levetiracetam (KEPPRA) 1000 MG tablet    Hippocampal sclerosis        Screening for depression        Mood disorder (HCC)        Encounter for female family planning counseling        Encounter for counseling regarding contraception        Memory loss        Vitamin D deficiency              Interim History:  Sana-Martinez 34 y.o. female presents today with  for seizure f/u. Both are [place] speakers and today's conversation in the office was with the help of a  via ipad provided by the office.    Pt had a seizure yesterday (7/25/2019) and she is still dizzy today. She has had plenty of convulsions since last visit and the  had witnessed these spells. Duration unknown. Pt gets dizzy afterwards. She is still taking keppra 1000mg bid.     Pt has depression. Pt reports she is better but not suicidal or homicidal. She didn't make an appointment with psychology or psychiatry and they didn't think she needs to see therapist.     Memory is worsening per pt and she started crying in the room.    She is sexually active and is not on birth control.   is using condom.  Patient refuses referral to GYN.  No plans of getting pregnant    She has done blood work.     She is taking vit D weekly.              Past medical history:   Past Medical History:   Diagnosis Date   • Anemia 1/30/2013   • Anxiety 1/19/2019   • Depression, major, recurrent, moderate (HCC) 9/26/2018   • Epilepsy associated with specific stimuli (HCC)    • Head ache    • Seizure (HCC)        Past surgical history:   History reviewed. No pertinent surgical history.    Family history:    Family History   Problem Relation Age of Onset   • Hypertension Father        Social history:   Social History     Social History   • Marital status: Single     Spouse name: N/A   • Number of children: N/A   • Years of education: N/A     Occupational History   • Not on file.     Social History Main Topics   • Smoking status: Never Smoker   • Smokeless tobacco: Never Used   • Alcohol use No   • Drug use: No   • Sexual activity: Yes     Partners: Male     Other Topics Concern   • Not on file     Social History Narrative    ** Merged History Encounter **            Current medications:   Current Outpatient Prescriptions   Medication   • ergocalciferol (DRISDOL) 59663 UNIT capsule   • escitalopram (LEXAPRO) 20 MG tablet   • silver sulfADIAZINE (SILVADENE) 1 % Cream   • levetiracetam (KEPPRA) 1000 MG tablet     No current facility-administered medications for this visit.        Medication Allergy:  No Known Allergies      Review of systems:     General: Denies fevers or chills, or nightsweats, or generalized fatigue.    Head: Denies headaches or lightheadedness  EENT: Denies vision changes, vision loss or pain, nasal secretion, nasal bleeding, difficulty swallowing, hearing loss, tinnitus, vertigo, ear pain  Respiratory: Denies shortness of breath, cough, sputum, or wheezing  Cardiac: Denies chest pain, palpitations, edema or syncope  Gastrointestinal: Denies nausea, vomiting, no abdominal pain or change in bowel habits, no melena or hematochezia  Urinary: Denies dysuria, frequency, hesitancy, or incontinence.  Dermatologic:  Denies new rash  Musculoskeletal: Denies muscle pain or swelling, no atrophy, no neck and back pain or stiffness.   Neurologic: Denies facial droopiness, muscle weakness (focal or generalized), paresthesias, ataxia, change in speech or language,   Psychiatric: Denies mood swings, suicidal or homicidal thoughts       Physical examination:   Vitals:    07/26/19 1446   BP: 120/78   Patient Position:  "Sitting   BP Cuff Size: Adult   Pulse: 90   Resp: 14   Temp: 36.8 °C (98.2 °F)   TempSrc: Temporal   SpO2: 96%   Weight: 45.4 kg (100 lb 1.4 oz)   Height: 1.6 m (5' 3\")     General: Patient in no acute distress, pleasant and cooperative.  HEENT: Normocephalic, no signs of acute trauma.   moist conjunctivae. Nares are patent. Oropharynx clear without lesions and normal  hard and soft palates.   Neck: Supple. There is normal range of motion.   Resp: clear to auscultation bilaterally. No wheezes or crackles.   CV: RRR, no murmurs.   Abdomen: normoactive bowel sounds, soft, non distended or tender.   Skin: no signs of acute rashes or trauma.   Musculoskeletal: joints exhibit full range of motion, without any pain to palpation. There are no signs of joint or muscle swelling. There is no tenderness to deep palpation of muscles.   Psychiatric: No hallucinatory behavior. No symptoms of depression or suicidal ideation. Mood and affect appear normal on exam.     NEUROLOGICAL EXAM:   Mental status, orientation: Awake, alert and fully oriented.   Speech and language: speech is clear and fluent. The patient is able to name, repeat and comprehend.   Memory: There is intact recollection of recent and remote events.   Cranial nerve exam:   CN I: Not examined   CN II: PERRL.   CN III, IV, VI: EOMI; no nystagmus   CN V: Facial sensation intact bilaterally   CN VII: face symmetric   CN VIII: hearing intact to finger rub bilaterally   CN IX, X: palate elevates symmetrically   CN XI: Symmetric shoulder shrug  CN XII: tongue midline. No signs of tongue biting or fasciculations   Motor exam: Strength is 5/5 in all extremities. Tone is normal. No abnormal movements were seen on exam.   Sensory exam reveals normal sense of light touch in all extremities.   Deep tendon reflexes: Brisk throughout. Plantar responses are flexor. There is no clonus.   Coordination: shows a normal finger-nose-finger. Normal rapidly alternating movements.   Gait: " The patient was able to get up from seated position on first attempt without requiring assistance. Found to be steady when walking. Movements were fluid with normal arm swing. The patient was able to turn without difficulties or tendency to fall. Romberg exam unremarkable      ANCILLARY DATA REVIEWED:       Lab Data Review:  Reviewed in chart.  Keppra, vitamin D    Records reviewed:   Reviewed in chart.    Imaging:   MRI brain w &w/o, 1/18/2019  1.  Left hippocampal sclerosis. There has been no significant interval change.  2.  There is no evidence of cortical dysplasia or neuronal migrational abnormality.     CT head, 4/15/17  Negative unenhanced CT of the brain.     EEG:  VEEG 1/18/19  This is abnormal routine video EEG recording in the awake and   drowsy/sleep state(s).  This scalp EEG denotes   1. Active focal cortical irritability / dysfunction over left   temporal --this patten is consistent with left temporal lobe   seizure        ASSESSMENT AND PLAN:    1. Localization-related epilepsy (HCC)  - levetiracetam (KEPPRA) 1000 MG tablet; Take 1 Tab by mouth 2 Times a Day.  Dispense: 60 Tab; Refill: 11    2. Temporal lobe epilepsy (HCC)    3. Hippocampal sclerosis    4. Screening for depression    5. Mood disorder (HCC)    6. Encounter for female family planning counseling    7. Encounter for counseling regarding contraception    8. Memory loss    9. Vitamin D deficiency          CLINICAL DISCUSSION:  Left temporal lobe epilepsy with left hippocampal sclerosis based on recent EEG and MRI brain. Pt started having GTC spells after delivery in 2004 in [place]. She was not prescribed any medication there. They moved to [place] in 2008, had another seizure while pregnant in 2008.  She was on carbamazepine prior to Keppra.  Keppra was increased to 1000 mg twice daily in January 2019 and had a couple of months without seizures.  Since last visit, she has had multiple seizures and her last one was on 7/25/2019.    Continues to  have issues with memory which could be from both epilepsy and depression.    Mood is better than before.  Still has depression.  No suicidal or homicidal thoughts.  Patient  do not think she needs appointment with psychiatry or psychology.    Past AED's:carbamazepine, epitol    Current AED's: Keppra 1000mg BID.  Keppra level therapeutic      Plan:  -Patient does not want any changes in her medications.  I do not think she will tolerate increase dose of Keppra due to her depression.  She does not want to add another AED despite frequency of spells.  She is aware of the risks of seizures including injury and even death.     -Offered to do 24-hour EEG but patient refused she does not have insurance.  She may be a good candidate for surgery for the left hippocampal sclerosis and might have a good seizure outcome.     - Discussed avoidance of spell/sz triggers: alcohol, sleep deprivation, and stress.    - Discussed Vit D supplementation. Taking weekly supplementation    -Recommended vit B12 for memory.    - Discussed driving restrictions. Pt does not drive. Will continue to do so.     -Recommended dual contraception.  Has been using condom. She/ aware of the risk of pregnancy complications while taking AED's which include congenital defects, abnormal fetal growth, , etc. They refused referral to GYN.  She is sexually active and not on birth control.  Recommended folic acid 2 mg daily but patient refused.        FOLLOW-UP:   Return in about 3 months (around 10/26/2019).        EDUCATION AND COUNSELING:  -Education was provided to the patient and/or family regarding diagnosis and prognosis. The chronic and unpredictable nature of the condition were discussed. There is increased risk for additional events, which may carry potential for significant injuries and death. Discussed frequent seizure triggers: sleep deprivation, medication non-compliance, use of illegal drugs/alcohol, stress, and others.    -We reviewed in detail the current antiepileptic regimen. Potential side effects of antiepileptics were discussed at length, including but no limited to: hypersensitivity reactions (rash and others, some of which can be fatal), visual field changes (some of which may be irreversible), glaucoma, diplopia, kidney stones, osteopenia/osteoporosis/bone fractures, hyperthermia/anhydrosis, hyponatremia, tremors/abnormal movements, ataxia, dizziness, fatigue, increased risk for falls, risk for cardiac arrhythmias/syncope, gastrointestinal side effects(hepatitis, pancreatitis, gastritis, ulcers), gingival hypertrophy/bleeding, drowsiness, sedation, anxiety/nervousness, increased risk for suicide, increased risk for depression, and psychosis.   -We also reviewed drug-drug interactions and their potential effect on seizure control and medication side effects.    -We also discussed in detail potential effects of seizures, epilepsy, and medications during pregnancy, including but not limited to fetal malformations, child developmental/intellectual disability, fetal/ risk for hemorrhages, stillbirth, maternal death, premature birth, and others. The patient/family aware that pregnancy should be avoided, unless desired, in which case we recommend discussing with us at least a year prior to planned conception. To avoid undesired pregnancy while on antiepileptics, we recommend dual contraception.   -Folic acid 2 mg is recommended for all females in childbearing age (12-44 years of age).   -Recommend chronic vitamin D supplementation and regular exercise (if not contraindicated).   -Patient/family educated on risk for SUDEP (Sudden Death in Epilepsy). Counseling was provided on the importance of strict medication and follow up compliance. The patient/family understand the risks associated with non-adherence with the medical plan as outlined, including but not limited to an increased risk for breakthrough seizures, which may  contribute to injuries, disability, status epilepticus, and even death.   -Counseling was also provided on potential effects of alcohol and other drugs, which may lower seizure threshold and/or affect the metabolism of antiepileptic drugs. We recommend avoidance of alcohol and illegal drugs.  -Avoid sleep deprivation.   -We extensively discussed the aspects related to safety in drivers who suffer from epilepsy. The patient is encourage to report to the Division of Motor Vehicles of any condition and/or spells related to confusion, disorientation, and/or loss of awareness and/or loss of consciousness; as these may pose a safety issue if they occur while operating a motor vehicle. The patient and/or family are ultimately responsible for exercising caution and abiding to regulations in place.   -Other seizure precautions were discussed at length, including no diving, no skydiving, no climbing or exposure to unprotected heights, no unsupervised swimming, no Jacuzzi or bathing in bathtubs or deep bodies of water. The patient/family have been advised about risks for operating any machinery while suffering from seizures / syncope / epilepsy and/or while taking antiepileptic drugs.   -The patient understands and agrees that due to the complexity of his/her diagnosis, results of any testing and further recommendations will typically be discussed/made during a face to face encounter in my office. The patient and/or family further understands it is their responsibility to keep proper follow up.     Patient/family agree with plan, as outlined.         Aylin Cheatham, MSN, APRN, FNP-C  [facility]  Office: [phone]  Fax: [phone]

## 2019-07-26 ENCOUNTER — OFFICE VISIT (OUTPATIENT)
Dept: NEUROLOGY | Facility: MEDICAL CENTER | Age: 34
End: 2019-07-26

## 2019-07-26 VITALS
TEMPERATURE: 98.2 F | DIASTOLIC BLOOD PRESSURE: 78 MMHG | HEART RATE: 90 BPM | HEIGHT: 63 IN | OXYGEN SATURATION: 96 % | RESPIRATION RATE: 14 BRPM | WEIGHT: 100.09 LBS | BODY MASS INDEX: 17.73 KG/M2 | SYSTOLIC BLOOD PRESSURE: 120 MMHG

## 2019-07-26 DIAGNOSIS — F39 MOOD DISORDER (HCC): ICD-10-CM

## 2019-07-26 DIAGNOSIS — G93.81 HIPPOCAMPAL SCLEROSIS: ICD-10-CM

## 2019-07-26 DIAGNOSIS — G40.109 TEMPORAL LOBE EPILEPSY (HCC): ICD-10-CM

## 2019-07-26 DIAGNOSIS — Z30.09 ENCOUNTER FOR COUNSELING REGARDING CONTRACEPTION: ICD-10-CM

## 2019-07-26 DIAGNOSIS — R41.3 MEMORY LOSS: ICD-10-CM

## 2019-07-26 DIAGNOSIS — E55.9 VITAMIN D DEFICIENCY: ICD-10-CM

## 2019-07-26 DIAGNOSIS — G40.109 LOCALIZATION-RELATED EPILEPSY (HCC): ICD-10-CM

## 2019-07-26 DIAGNOSIS — Z30.09 ENCOUNTER FOR FEMALE FAMILY PLANNING COUNSELING: ICD-10-CM

## 2019-07-26 DIAGNOSIS — Z13.31 SCREENING FOR DEPRESSION: ICD-10-CM

## 2019-07-26 PROCEDURE — 99215 OFFICE O/P EST HI 40 MIN: CPT | Performed by: NURSE PRACTITIONER

## 2019-07-26 RX ORDER — LEVETIRACETAM 1000 MG/1
1000 TABLET ORAL 2 TIMES DAILY
Qty: 60 TAB | Refills: 11 | Status: SHIPPED | OUTPATIENT
Start: 2019-07-26 | End: 2020-01-13 | Stop reason: SDUPTHER
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
## 2020-01-10 NOTE — PROGRESS NOTES
Chief Complaint   Patient presents with   • Follow-Up     Localization-related epilepsy (HCC)       Problem List Items Addressed This Visit     None      Visit Diagnoses     Temporal lobe epilepsy (HCC)        Hippocampal sclerosis        Screening for depression        Mood disorder (HCC)        Encounter for female family planning counseling        Encounter for counseling regarding contraception        Memory loss              Interim History:  Sana-Martinez 34 y.o. female presents today for seizure f/u.  Utilized  via ipad provided by the office.     Pt is crying during today's visit. She reports that she is having marital problems and her  doesn't love her anymore thinking that she is cheating on him. She reports that she was at the house all the time; she can't even take her daughter to school. She is still sexually active and is not on birth control. Aware of dual contraception. No plans of getting pregnant. Patient refuses referral to GYN or taking folic acid.      Pt reports she doesn't know when she had a seizure because of memory loss.  joined us and he was able to report that her last spell was 2 weeks ago. Her spell consists of dizziness and having impaired awareness for unknown duration.  reports it's better than before as she is not having frequent convulsions. They are occurring once or twice a week especially around her period. She is still taking keppra 1000mg bid. No reported side effects. Compliant. She does not want a longer EEG.      Pt has depression. Not suicidal or homicidal. Continues to refuse psychology or psychiatry.      Memory is worsening per pt.      She is taking vit D daily. Finished her weekly supplementation.       Past medical history:   Past Medical History:   Diagnosis Date   • Anemia 1/30/2013   • Anxiety 1/19/2019   • Depression, major, recurrent, moderate (HCC) 9/26/2018   • Epilepsy associated with specific stimuli (HCC)     • Head ache    • Seizure (HCC)        Past surgical history:   History reviewed. No pertinent surgical history.    Family history:   Family History   Problem Relation Age of Onset   • Hypertension Father        Social history:   Social History     Socioeconomic History   • Marital status: Single     Spouse name: Not on file   • Number of children: Not on file   • Years of education: Not on file   • Highest education level: Not on file   Occupational History   • Not on file   Social Needs   • Financial resource strain: Not on file   • Food insecurity:     Worry: Not on file     Inability: Not on file   • Transportation needs:     Medical: Not on file     Non-medical: Not on file   Tobacco Use   • Smoking status: Never Smoker   • Smokeless tobacco: Never Used   Substance and Sexual Activity   • Alcohol use: No   • Drug use: No   • Sexual activity: Yes     Partners: Male   Lifestyle   • Physical activity:     Days per week: Not on file     Minutes per session: Not on file   • Stress: Not on file   Relationships   • Social connections:     Talks on phone: Not on file     Gets together: Not on file     Attends [employer] service: Not on file     Active member of club or organization: Not on file     Attends meetings of clubs or organizations: Not on file     Relationship status: Not on file   • Intimate partner violence:     Fear of current or ex partner: Not on file     Emotionally abused: Not on file     Physically abused: Not on file     Forced sexual activity: Not on file   Other Topics Concern   • Not on file   Social History Narrative    ** Merged History Encounter **            Current medications:   Current Outpatient Medications   Medication   • levetiracetam (KEPPRA) 1000 MG tablet   • ergocalciferol (DRISDOL) 01321 UNIT capsule   • escitalopram (LEXAPRO) 20 MG tablet     No current facility-administered medications for this visit.        Medication Allergy:  No Known Allergies      Review of systems:  "    General: Denies fevers or chills, or nightsweats, or generalized fatigue. Head: Denies headaches or dizziness or lightheadedness  EENT: Denies vision changes, vision loss or pain, nasal secretion, nasal bleeding, difficulty swallowing, hearing loss, tinnitus, vertigo, ear pain  Respiratory: Denies shortness of breath, cough, sputum, or wheezing  Cardiac: Denies chest pain, palpitations, edema or syncope  Gastrointestinal: Denies nausea, vomiting, no abdominal pain or change in bowel habits, no melena or hematochezia  Urinary: Denies dysuria, frequency, hesitancy, or incontinence.  Dermatologic:  Denies new rash  Musculoskeletal: Denies muscle pain or swelling, no atrophy, no neck and back pain or stiffness.   Neurologic: Denies facial droopiness, muscle weakness (focal or generalized), paresthesias, ataxia, change in speech or language, memory loss, abnormal movements  Psychiatric: Denies anxiety, mood swings, suicidal or homicidal thoughts       Physical examination:   Vitals:    01/13/20 1358   BP: 118/68   BP Location: Right arm   Patient Position: Sitting   BP Cuff Size: Adult   Pulse: 72   Resp: 14   Temp: 36.2 °C (97.2 °F)   TempSrc: Temporal   SpO2: 92%   Weight: (!) 4.99 kg (11 lb)   Height: 1.6 m (5' 3\")     General: Patient in no acute distress, pleasant and cooperative.  HEENT: Normocephalic, no signs of acute trauma.   Neck: Supple. There is normal range of motion.   Resp: clear to auscultation bilaterally. No wheezes or crackles.   CV: RRR, no murmurs.   Skin: no signs of acute rashes or trauma.   Musculoskeletal: joints exhibit full range of motion, without any pain to palpation. There are no signs of joint or muscle swelling. There is no tenderness to deep palpation of muscles.   Psychiatric: No hallucinatory behavior. No symptoms of depression or suicidal ideation. Mood and affect appear normal on exam.      NEUROLOGICAL EXAM:   Mental status, orientation: Awake, alert and fully oriented. "   Speech and language: speech is clear and fluent. The patient is able to name, repeat and comprehend.   Memory: There is intact recollection of recent and remote events.   Cranial nerve exam:   CN I: Not examined   CN II: PERRL.   CN III, IV, VI: EOMI; no nystagmus   CN V: Facial sensation intact bilaterally   CN VII: face symmetric   CN VIII: hearing intact to finger rub bilaterally   CN IX, X: palate elevates symmetrically   CN XI: Symmetric shoulder shrug  CN XII: tongue midline. No signs of tongue biting or fasciculations   Motor exam: Strength is 5/5 in all extremities. Tone is normal. No abnormal movements were seen on exam.   Sensory exam reveals normal sense of light touch in all extremities.   Deep tendon reflexes: Brisk throughout. Plantar responses are flexor. There is no clonus.   Coordination: shows a normal finger-nose-finger. Normal rapidly alternating movements.   Gait: The patient was able to get up from seated position on first attempt without requiring assistance. Found to be steady when walking. Movements were fluid with normal arm swing. The patient was able to turn without difficulties or tendency to fall. Romberg exam unremarkable    ANCILLARY DATA REVIEWED:       Lab Data Review:  Reviewed in chart.     Records reviewed:   Reviewed in chart.    Imaging:   MRI brain w &w/o, 1/18/2019  1.  Left hippocampal sclerosis. There has been no significant interval change.   2.  There is no evidence of cortical dysplasia or neuronal migrational abnormality.      CT head, 4/15/17  Negative unenhanced CT of the brain.     EEG:  VEEG 1/18/19  This is abnormal routine video EEG recording in the awake and   drowsy/sleep state(s).   This scalp EEG denotes   1. Active focal cortical irritability / dysfunction over left   temporal --this patten is consistent with left temporal lobe   seizure        ASSESSMENT AND PLAN:    1. Temporal lobe epilepsy (HCC)  - lamoTRIgine (LAMICTAL) 25 MG Tab; Take 1 Tab by mouth 2  times a day.  Dispense: 240 Tab; Refill: 11  - levetiracetam (KEPPRA) 1000 MG tablet; Take 1 Tab by mouth 2 Times a Day.  Dispense: 60 Tab; Refill: 11  - CBC WITH DIFFERENTIAL; Future  - Comp Metabolic Panel; Future    2. Hippocampal sclerosis    3. Screening for depression    4. Mood disorder (HCC)    5. Encounter for female family planning counseling    6. Encounter for counseling regarding contraception    7. Memory loss    8. Vitamin D deficiency  - VITAMIN D,25 HYDROXY; Future    9. Localization-related epilepsy (HCC)            CLINICAL DISCUSSION:  Left temporal lobe epilepsy with left hippocampal sclerosis based on recent EEG and MRI brain. Pt started having GTC spells after delivery in 2004 in [place]. She was not prescribed any medication there. They moved to [place] in 2008, had another seizure while pregnant in 2008.  She was on carbamazepine prior to Keppra.  Keppra was increased to 1000 mg twice daily in January 2019 and had a couple of months without seizures.  She continues to have seizures and pt cannot keep track of them. Last one that was witnessed by the  was 2 weeks ago and he is not concerned because her spells consist of dizziness and impaired awareness and not convulsions.         Continues to have issues with memory which could be from both epilepsy and depression.     Mood is okay.  She is depressed especially now that she is havign marital problems. No suicidal or homicidal thoughts.  They continue to refuse referral to psychiatry or psychology.     Past AED's:carbamazepine, epitol     Current AED's: Keppra 1000mg BID, lamotrigine titrate to 100mg BID.         Plan:  -Offered to switch the keppra again to another seizure medication but pt refused because she doesn't have insurance and may not afford it. She is okay with adding another seizure medication. She will try lamotrigine 25mg. She is aware of side effects including rash. Hopefully this will help with her depression d/t mood  enhancing effect. Given verbal instructions for titration and she verbalized understanding:    Lamotrgine: Start taking 1 tab twice a day for a week, then take 1 tab in am and 2 tabs at night for another week, then 2 tabs in am and 2 tabs at night for another week, then 2 tabs in am and 3 tabs at night for another week, then 3 tabs twice a day for another week, then 3tabs in am and 4 tabs at night for another week, then 4 tabs twice a day thereafter.       -Pt refused further diagnostic work-up.      - Discussed avoidance of spell/sz triggers: alcohol, sleep deprivation, and stress.     - Discussed Vit D supplementation. Recommended Vit D 2000-5000u daily. She completed her weekly supplementation.      -Recommended vit B12 for memory.     - Discussed driving restrictions. Pt does not drive. Will continue to do so.      -Recommended dual contraception. No plans of getting pregnant. She/ aware of the risk of pregnancy complications while taking AED's which include congenital defects, abnormal fetal growth, , etc. They continue to refuse referral to GYN. She is sexually active and not on birth control. Recommended folic acid 2 mg daily but patient refused.             FOLLOW-UP:   Return in about 3 months (around 2020).           EDUCATION AND COUNSELING:  -Education was provided to the patient and/or family regarding diagnosis and prognosis. The chronic and unpredictable nature of the condition were discussed. There is increased risk for additional events, which may carry potential for significant injuries and death. Discussed frequent seizure triggers: sleep deprivation, medication non-compliance, use of illegal drugs/alcohol, stress, and others.   -We reviewed in detail the current antiepileptic regimen. Potential side effects of antiepileptics were discussed at length, including but no limited to: hypersensitivity reactions (rash and others, some of which can be fatal), visual field changes  (some of which may be irreversible), glaucoma, diplopia, kidney stones, osteopenia/osteoporosis/bone fractures, hyperthermia/anhydrosis, hyponatremia, tremors/abnormal movements, ataxia, dizziness, fatigue, increased risk for falls, risk for cardiac arrhythmias/syncope, gastrointestinal side effects(hepatitis, pancreatitis, gastritis, ulcers), gingival hypertrophy/bleeding, drowsiness, sedation, anxiety/nervousness, increased risk for suicide, increased risk for depression, and psychosis.   -We also reviewed drug-drug interactions and their potential effect on seizure control and medication side effects.    -We also discussed in detail potential effects of seizures, epilepsy, and medications during pregnancy, including but not limited to fetal malformations, child developmental/intellectual disability, fetal/ risk for hemorrhages, stillbirth, maternal death, premature birth, and others. The patient/family aware that pregnancy should be avoided, unless desired, in which case we recommend discussing with us at least a year prior to planned conception. To avoid undesired pregnancy while on antiepileptics, we recommend dual contraception.   -Folic acid 2 mg is recommended for all females in childbearing age (12-44 years of age).   -Recommend chronic vitamin D supplementation and regular exercise (if not contraindicated).   -Patient/family educated on risk for SUDEP (Sudden Death in Epilepsy). Counseling was provided on the importance of strict medication and follow up compliance. The patient/family understand the risks associated with non-adherence with the medical plan as outlined, including but not limited to an increased risk for breakthrough seizures, which may contribute to injuries, disability, status epilepticus, and even death.   -Counseling was also provided on potential effects of alcohol and other drugs, which may lower seizure threshold and/or affect the metabolism of antiepileptic drugs. We recommend  avoidance of alcohol and illegal drugs.  -Avoid sleep deprivation.   -We extensively discussed the aspects related to safety in drivers who suffer from epilepsy. The patient is encourage to report to the Division of Motor Vehicles of any condition and/or spells related to confusion, disorientation, and/or loss of awareness and/or loss of consciousness; as these may pose a safety issue if they occur while operating a motor vehicle. The patient and/or family are ultimately responsible for exercising caution and abiding to regulations in place.   -Other seizure precautions were discussed at length, including no diving, no skydiving, no climbing or exposure to unprotected heights, no unsupervised swimming, no Jacuzzi or bathing in bathtubs or deep bodies of water. The patient/family have been advised about risks for operating any machinery while suffering from seizures / syncope / epilepsy and/or while taking antiepileptic drugs.   -The patient understands and agrees that due to the complexity of his/her diagnosis, results of any testing and further recommendations will typically be discussed/made during a face to face encounter in my office. The patient and/or family further understands it is their responsibility to keep proper follow up.     Patient/family agree with plan, as outlined.         Aylin Cheatham, MSN, APRN, FNP-C  [facility]  Office: [phone]  Fax: [phone]

## 2020-01-13 ENCOUNTER — OFFICE VISIT (OUTPATIENT)
Dept: NEUROLOGY | Facility: MEDICAL CENTER | Age: 35
End: 2020-01-13

## 2020-01-13 VITALS
RESPIRATION RATE: 14 BRPM | HEIGHT: 63 IN | OXYGEN SATURATION: 92 % | WEIGHT: 11 LBS | DIASTOLIC BLOOD PRESSURE: 68 MMHG | HEART RATE: 72 BPM | SYSTOLIC BLOOD PRESSURE: 118 MMHG | TEMPERATURE: 97.2 F | BODY MASS INDEX: 1.95 KG/M2

## 2020-01-13 DIAGNOSIS — G40.109 TEMPORAL LOBE EPILEPSY (HCC): ICD-10-CM

## 2020-01-13 DIAGNOSIS — R41.3 MEMORY LOSS: ICD-10-CM

## 2020-01-13 DIAGNOSIS — Z30.09 ENCOUNTER FOR COUNSELING REGARDING CONTRACEPTION: ICD-10-CM

## 2020-01-13 DIAGNOSIS — G40.109 LOCALIZATION-RELATED EPILEPSY (HCC): ICD-10-CM

## 2020-01-13 DIAGNOSIS — G93.81 HIPPOCAMPAL SCLEROSIS: ICD-10-CM

## 2020-01-13 DIAGNOSIS — Z30.09 ENCOUNTER FOR FEMALE FAMILY PLANNING COUNSELING: ICD-10-CM

## 2020-01-13 DIAGNOSIS — Z13.31 SCREENING FOR DEPRESSION: ICD-10-CM

## 2020-01-13 DIAGNOSIS — E55.9 VITAMIN D DEFICIENCY: ICD-10-CM

## 2020-01-13 DIAGNOSIS — F39 MOOD DISORDER (HCC): ICD-10-CM

## 2020-01-13 PROCEDURE — 99215 OFFICE O/P EST HI 40 MIN: CPT | Performed by: NURSE PRACTITIONER

## 2020-01-13 RX ORDER — LAMOTRIGINE 25 MG/1
25 TABLET ORAL 2 TIMES DAILY
Qty: 240 TAB | Refills: 11 | Status: SHIPPED | OUTPATIENT
Start: 2020-01-13 | End: 2020-01-29 | Stop reason: SINTOL

## 2020-01-13 RX ORDER — LEVETIRACETAM 1000 MG/1
1000 TABLET ORAL 2 TIMES DAILY
Qty: 60 TAB | Refills: 11 | Status: SHIPPED
Start: 2020-01-13 | End: 2020-03-12

## 2020-01-13 NOTE — PATIENT INSTRUCTIONS
Lamotrgine: Start taking 1 tab twice a day for a week, then take 1 tab in am and 2 tabs at night for another week, then 2 tabs in am and 2 tabs at night for another week, then 2 tabs in am and 3 tabs at night for another week, then 3 tabs twice a day for another week, then 3tabs in am and 4 tabs at night for another week, then 4 tabs twice a day thereafter.

## 2020-01-29 ENCOUNTER — TELEPHONE (OUTPATIENT)
Dept: NEUROLOGY | Facility: MEDICAL CENTER | Age: 35
End: 2020-01-29

## 2020-01-29 DIAGNOSIS — G40.109 LOCALIZATION-RELATED EPILEPSY (HCC): ICD-10-CM

## 2020-01-29 RX ORDER — ZONISAMIDE 100 MG/1
100 CAPSULE ORAL
Qty: 30 CAP | Refills: 11 | Status: SHIPPED
Start: 2020-01-29 | End: 2020-03-12

## 2020-01-29 NOTE — PROGRESS NOTES
Pt called notifying us that she started having rash in her legs after starting the lamictal. She is on her second week of titration. Will d/c lamotrigine and switch it to zonisamide 100mg QHS. Will schedule a f/u visit soon.

## 2020-03-10 ENCOUNTER — TELEPHONE (OUTPATIENT)
Dept: HEALTH INFORMATION MANAGEMENT | Facility: OTHER | Age: 35
End: 2020-03-10

## 2020-03-10 NOTE — TELEPHONE ENCOUNTER
Hello,    I spoke with this patient and she has a few concerns regarding her medications. She said her new medications are not working for her.She is requesting a call to review her medications.   She stated she will be going to get labs done.  Please advise.

## 2020-03-12 ENCOUNTER — HOSPITAL ENCOUNTER (INPATIENT)
Facility: MEDICAL CENTER | Age: 35
LOS: 7 days | DRG: 809 | End: 2020-03-21
Attending: EMERGENCY MEDICINE | Admitting: INTERNAL MEDICINE
Payer: MEDICAID

## 2020-03-12 ENCOUNTER — APPOINTMENT (OUTPATIENT)
Dept: RADIOLOGY | Facility: MEDICAL CENTER | Age: 35
DRG: 809 | End: 2020-03-12
Attending: EMERGENCY MEDICINE
Payer: MEDICAID

## 2020-03-12 ENCOUNTER — APPOINTMENT (OUTPATIENT)
Dept: RADIOLOGY | Facility: MEDICAL CENTER | Age: 35
DRG: 809 | End: 2020-03-12
Payer: MEDICAID

## 2020-03-12 ENCOUNTER — TELEPHONE (OUTPATIENT)
Dept: NEUROLOGY | Facility: MEDICAL CENTER | Age: 35
End: 2020-03-12

## 2020-03-12 ENCOUNTER — HOSPITAL ENCOUNTER (OUTPATIENT)
Dept: LAB | Facility: MEDICAL CENTER | Age: 35
End: 2020-03-12
Attending: NURSE PRACTITIONER
Payer: MEDICAID

## 2020-03-12 DIAGNOSIS — D64.9 ANEMIA, UNSPECIFIED TYPE: ICD-10-CM

## 2020-03-12 DIAGNOSIS — D69.6 THROMBOCYTOPENIA (HCC): ICD-10-CM

## 2020-03-12 DIAGNOSIS — G40.109 TEMPORAL LOBE EPILEPSY (HCC): ICD-10-CM

## 2020-03-12 DIAGNOSIS — Z87.898 HISTORY OF SEIZURE: ICD-10-CM

## 2020-03-12 DIAGNOSIS — D72.819 LEUKOPENIA, UNSPECIFIED TYPE: ICD-10-CM

## 2020-03-12 DIAGNOSIS — E55.9 VITAMIN D DEFICIENCY: ICD-10-CM

## 2020-03-12 LAB
25(OH)D3 SERPL-MCNC: 51 NG/ML (ref 30–100)
ALBUMIN SERPL BCP-MCNC: 4.6 G/DL (ref 3.2–4.9)
ALBUMIN/GLOB SERPL: 1.8 G/DL
ALP SERPL-CCNC: 71 U/L (ref 30–99)
ALT SERPL-CCNC: 30 U/L (ref 2–50)
ANION GAP SERPL CALC-SCNC: 9 MMOL/L (ref 7–16)
ANISOCYTOSIS BLD QL SMEAR: ABNORMAL
APPEARANCE UR: CLEAR
AST SERPL-CCNC: 32 U/L (ref 12–45)
BACTERIA #/AREA URNS HPF: ABNORMAL /HPF
BASOPHILS # BLD AUTO: 0 % (ref 0–1.8)
BASOPHILS # BLD AUTO: 0 % (ref 0–1.8)
BASOPHILS # BLD: 0 K/UL (ref 0–0.12)
BASOPHILS # BLD: 0 K/UL (ref 0–0.12)
BILIRUB SERPL-MCNC: 0.4 MG/DL (ref 0.1–1.5)
BILIRUB UR QL STRIP.AUTO: NEGATIVE
BUN SERPL-MCNC: 11 MG/DL (ref 8–22)
CALCIUM SERPL-MCNC: 9.3 MG/DL (ref 8.5–10.5)
CHLORIDE SERPL-SCNC: 107 MMOL/L (ref 96–112)
CO2 SERPL-SCNC: 23 MMOL/L (ref 20–33)
COLOR UR: YELLOW
COMMENT 1642: NORMAL
CREAT SERPL-MCNC: 0.62 MG/DL (ref 0.5–1.4)
EKG IMPRESSION: NORMAL
EOSINOPHIL # BLD AUTO: 0 K/UL (ref 0–0.51)
EOSINOPHIL # BLD AUTO: 0 K/UL (ref 0–0.51)
EOSINOPHIL NFR BLD: 0 % (ref 0–6.9)
EOSINOPHIL NFR BLD: 0 % (ref 0–6.9)
EPI CELLS #/AREA URNS HPF: ABNORMAL /HPF
ERYTHROCYTE [DISTWIDTH] IN BLOOD BY AUTOMATED COUNT: 44.8 FL (ref 35.9–50)
ERYTHROCYTE [DISTWIDTH] IN BLOOD BY AUTOMATED COUNT: 45.6 FL (ref 35.9–50)
GLOBULIN SER CALC-MCNC: 2.5 G/DL (ref 1.9–3.5)
GLUCOSE SERPL-MCNC: 100 MG/DL (ref 65–99)
GLUCOSE UR STRIP.AUTO-MCNC: NEGATIVE MG/DL
HCG SERPL QL: NEGATIVE
HCT VFR BLD AUTO: 32.8 % (ref 37–47)
HCT VFR BLD AUTO: 34.1 % (ref 37–47)
HGB BLD-MCNC: 10 G/DL (ref 12–16)
HGB BLD-MCNC: 10.3 G/DL (ref 12–16)
HYALINE CASTS #/AREA URNS LPF: ABNORMAL /LPF
IMM GRANULOCYTES # BLD AUTO: 0 K/UL (ref 0–0.11)
IMM GRANULOCYTES # BLD AUTO: 0 K/UL (ref 0–0.11)
IMM GRANULOCYTES NFR BLD AUTO: 0 % (ref 0–0.9)
IMM GRANULOCYTES NFR BLD AUTO: 0 % (ref 0–0.9)
KETONES UR STRIP.AUTO-MCNC: NEGATIVE MG/DL
LEUKOCYTE ESTERASE UR QL STRIP.AUTO: ABNORMAL
LYMPHOCYTES # BLD AUTO: 0.55 K/UL (ref 1–4.8)
LYMPHOCYTES # BLD AUTO: 0.69 K/UL (ref 1–4.8)
LYMPHOCYTES NFR BLD: 36.9 % (ref 22–41)
LYMPHOCYTES NFR BLD: 36.9 % (ref 22–41)
MCH RBC QN AUTO: 22.4 PG (ref 27–33)
MCH RBC QN AUTO: 23.3 PG (ref 27–33)
MCHC RBC AUTO-ENTMCNC: 29.3 G/DL (ref 33.6–35)
MCHC RBC AUTO-ENTMCNC: 31.4 G/DL (ref 33.6–35)
MCV RBC AUTO: 74 FL (ref 81.4–97.8)
MCV RBC AUTO: 76.5 FL (ref 81.4–97.8)
MICRO URNS: ABNORMAL
MICROCYTES BLD QL SMEAR: ABNORMAL
MONOCYTES # BLD AUTO: 0.24 K/UL (ref 0–0.85)
MONOCYTES # BLD AUTO: 0.28 K/UL (ref 0–0.85)
MONOCYTES NFR BLD AUTO: 15 % (ref 0–13.4)
MONOCYTES NFR BLD AUTO: 16.1 % (ref 0–13.4)
MORPHOLOGY BLD-IMP: NORMAL
NEUTROPHILS # BLD AUTO: 0.7 K/UL (ref 2–7.15)
NEUTROPHILS # BLD AUTO: 0.9 K/UL (ref 2–7.15)
NEUTROPHILS NFR BLD: 47 % (ref 44–72)
NEUTROPHILS NFR BLD: 48.1 % (ref 44–72)
NITRITE UR QL STRIP.AUTO: NEGATIVE
NRBC # BLD AUTO: 0 K/UL
NRBC # BLD AUTO: 0 K/UL
NRBC BLD-RTO: 0 /100 WBC
NRBC BLD-RTO: 0 /100 WBC
OVALOCYTES BLD QL SMEAR: NORMAL
PH UR STRIP.AUTO: 7.5 [PH] (ref 5–8)
PLATELET # BLD AUTO: 118 K/UL (ref 164–446)
PLATELET # BLD AUTO: 121 K/UL (ref 164–446)
PLATELET BLD QL SMEAR: NORMAL
PMV BLD AUTO: 10.3 FL (ref 9–12.9)
PMV BLD AUTO: 9.7 FL (ref 9–12.9)
POIKILOCYTOSIS BLD QL SMEAR: NORMAL
POTASSIUM SERPL-SCNC: 4.3 MMOL/L (ref 3.6–5.5)
PROT SERPL-MCNC: 7.1 G/DL (ref 6–8.2)
PROT UR QL STRIP: NEGATIVE MG/DL
RBC # BLD AUTO: 4.43 M/UL (ref 4.2–5.4)
RBC # BLD AUTO: 4.46 M/UL (ref 4.2–5.4)
RBC # URNS HPF: ABNORMAL /HPF
RBC BLD AUTO: PRESENT
RBC UR QL AUTO: NEGATIVE
SODIUM SERPL-SCNC: 139 MMOL/L (ref 135–145)
SP GR UR STRIP.AUTO: 1.01
TROPONIN T SERPL-MCNC: <6 NG/L (ref 6–19)
UROBILINOGEN UR STRIP.AUTO-MCNC: 0.2 MG/DL
WBC # BLD AUTO: 1.5 K/UL (ref 4.8–10.8)
WBC # BLD AUTO: 1.9 K/UL (ref 4.8–10.8)
WBC #/AREA URNS HPF: ABNORMAL /HPF

## 2020-03-12 PROCEDURE — 36415 COLL VENOUS BLD VENIPUNCTURE: CPT

## 2020-03-12 PROCEDURE — 700102 HCHG RX REV CODE 250 W/ 637 OVERRIDE(OP): Performed by: STUDENT IN AN ORGANIZED HEALTH CARE EDUCATION/TRAINING PROGRAM

## 2020-03-12 PROCEDURE — 84484 ASSAY OF TROPONIN QUANT: CPT

## 2020-03-12 PROCEDURE — 80053 COMPREHEN METABOLIC PANEL: CPT | Mod: 91

## 2020-03-12 PROCEDURE — 87086 URINE CULTURE/COLONY COUNT: CPT

## 2020-03-12 PROCEDURE — 84703 CHORIONIC GONADOTROPIN ASSAY: CPT

## 2020-03-12 PROCEDURE — 71045 X-RAY EXAM CHEST 1 VIEW: CPT

## 2020-03-12 PROCEDURE — G0378 HOSPITAL OBSERVATION PER HR: HCPCS

## 2020-03-12 PROCEDURE — 87077 CULTURE AEROBIC IDENTIFY: CPT

## 2020-03-12 PROCEDURE — 70450 CT HEAD/BRAIN W/O DYE: CPT

## 2020-03-12 PROCEDURE — 87186 SC STD MICRODIL/AGAR DIL: CPT

## 2020-03-12 PROCEDURE — 85025 COMPLETE CBC W/AUTO DIFF WBC: CPT | Mod: 91

## 2020-03-12 PROCEDURE — A9270 NON-COVERED ITEM OR SERVICE: HCPCS | Performed by: STUDENT IN AN ORGANIZED HEALTH CARE EDUCATION/TRAINING PROGRAM

## 2020-03-12 PROCEDURE — G0475 HIV COMBINATION ASSAY: HCPCS

## 2020-03-12 PROCEDURE — 85025 COMPLETE CBC W/AUTO DIFF WBC: CPT

## 2020-03-12 PROCEDURE — 80053 COMPREHEN METABOLIC PANEL: CPT

## 2020-03-12 PROCEDURE — 93005 ELECTROCARDIOGRAM TRACING: CPT | Performed by: EMERGENCY MEDICINE

## 2020-03-12 PROCEDURE — 82306 VITAMIN D 25 HYDROXY: CPT

## 2020-03-12 PROCEDURE — 81001 URINALYSIS AUTO W/SCOPE: CPT

## 2020-03-12 PROCEDURE — 93005 ELECTROCARDIOGRAM TRACING: CPT

## 2020-03-12 PROCEDURE — 99285 EMERGENCY DEPT VISIT HI MDM: CPT

## 2020-03-12 RX ORDER — LEVETIRACETAM 500 MG/1
1000 TABLET ORAL 2 TIMES DAILY
Status: DISCONTINUED | OUTPATIENT
Start: 2020-03-13 | End: 2020-03-14

## 2020-03-12 RX ORDER — ZONISAMIDE 100 MG/1
100 CAPSULE ORAL
Status: ON HOLD | COMMUNITY
End: 2020-03-21

## 2020-03-12 RX ORDER — BISACODYL 10 MG
10 SUPPOSITORY, RECTAL RECTAL
Status: DISCONTINUED | OUTPATIENT
Start: 2020-03-12 | End: 2020-03-21 | Stop reason: HOSPADM

## 2020-03-12 RX ORDER — LAMOTRIGINE 25 MG/1
25 TABLET ORAL 2 TIMES DAILY
COMMUNITY
End: 2020-03-12

## 2020-03-12 RX ORDER — AMOXICILLIN 250 MG
2 CAPSULE ORAL 2 TIMES DAILY
Status: DISCONTINUED | OUTPATIENT
Start: 2020-03-12 | End: 2020-03-21 | Stop reason: HOSPADM

## 2020-03-12 RX ORDER — POLYETHYLENE GLYCOL 3350 17 G/17G
1 POWDER, FOR SOLUTION ORAL
Status: DISCONTINUED | OUTPATIENT
Start: 2020-03-12 | End: 2020-03-21 | Stop reason: HOSPADM

## 2020-03-12 RX ORDER — ACETAMINOPHEN 325 MG/1
650 TABLET ORAL EVERY 6 HOURS PRN
Status: DISCONTINUED | OUTPATIENT
Start: 2020-03-12 | End: 2020-03-21 | Stop reason: HOSPADM

## 2020-03-12 RX ORDER — LEVETIRACETAM 1000 MG/1
1000 TABLET ORAL 2 TIMES DAILY
Status: ON HOLD | COMMUNITY
End: 2020-03-21

## 2020-03-12 RX ORDER — IBUPROFEN 200 MG
200 TABLET ORAL EVERY 6 HOURS PRN
Status: ON HOLD | COMMUNITY
End: 2020-03-21

## 2020-03-12 RX ADMIN — ACETAMINOPHEN 650 MG: 325 TABLET, FILM COATED ORAL at 22:04

## 2020-03-12 ASSESSMENT — COGNITIVE AND FUNCTIONAL STATUS - GENERAL
CLIMB 3 TO 5 STEPS WITH RAILING: A LITTLE
SUGGESTED CMS G CODE MODIFIER DAILY ACTIVITY: CH
MOBILITY SCORE: 22
SUGGESTED CMS G CODE MODIFIER MOBILITY: CJ
WALKING IN HOSPITAL ROOM: A LITTLE
DAILY ACTIVITIY SCORE: 24

## 2020-03-12 ASSESSMENT — LIFESTYLE VARIABLES
EVER HAD A DRINK FIRST THING IN THE MORNING TO STEADY YOUR NERVES TO GET RID OF A HANGOVER: NO
ON A TYPICAL DAY WHEN YOU DRINK ALCOHOL HOW MANY DRINKS DO YOU HAVE: 0
HAVE PEOPLE ANNOYED YOU BY CRITICIZING YOUR DRINKING: NO
TOTAL SCORE: 0
DOES PATIENT WANT TO STOP DRINKING: CANNOT ASSESS
EVER HAD A DRINK FIRST THING IN THE MORNING TO STEADY YOUR NERVES TO GET RID OF A HANGOVER: NO
CONSUMPTION TOTAL: INCOMPLETE
EVER FELT BAD OR GUILTY ABOUT YOUR DRINKING: NO
AVERAGE NUMBER OF DAYS PER WEEK YOU HAVE A DRINK CONTAINING ALCOHOL: 0
CONSUMPTION TOTAL: INCOMPLETE
TOTAL SCORE: 0
HAVE YOU EVER FELT YOU SHOULD CUT DOWN ON YOUR DRINKING: NO
ALCOHOL_USE: NO
HOW MANY TIMES IN THE PAST YEAR HAVE YOU HAD 5 OR MORE DRINKS IN A DAY: 0
EVER FELT BAD OR GUILTY ABOUT YOUR DRINKING: NO
TOTAL SCORE: 0
ALCOHOL_USE: NO
HAVE YOU EVER FELT YOU SHOULD CUT DOWN ON YOUR DRINKING: NO
DOES PATIENT WANT TO STOP DRINKING: CANNOT ASSESS
EVER_SMOKED: NEVER

## 2020-03-12 ASSESSMENT — PATIENT HEALTH QUESTIONNAIRE - PHQ9
1. LITTLE INTEREST OR PLEASURE IN DOING THINGS: NOT AT ALL
2. FEELING DOWN, DEPRESSED, IRRITABLE, OR HOPELESS: NOT AT ALL
SUM OF ALL RESPONSES TO PHQ9 QUESTIONS 1 AND 2: 0

## 2020-03-12 ASSESSMENT — FIBROSIS 4 INDEX: FIB4 SCORE: 1.17

## 2020-03-12 NOTE — TELEPHONE ENCOUNTER
Got a call from the lab notifying that pt has a critical WBC result. Abs neutrophils was also low. Her H&H and platelets were also trending down. Called pt and spoke with  to go to the ER asap. They verbalized understanding.

## 2020-03-12 NOTE — ED PROVIDER NOTES
ED Provider Note    CHIEF COMPLAINT  Chief Complaint   Patient presents with   • Sent by MD     pt getting testing by neurologist with new dx for epilesy.   • Abnormal Labs     low WBC 1.5 on 3/12,        HPI  Jennifer Lopez is a 34 y.o. female who presents complaining of general malaise, last seizure was yesterday.  Patient in the office of her neurologist today, labs have been obtained this morning demonstrating new leukopenia.  Baseline white blood cell count approximately 4-1/2, one value was at 3.  Today she was seen at 1.5, sent to the emergency department by her neurologist for evaluation.  Patient denies problems with blood counts in the past.  She has had body aches, abdominal pain, headaches.  All 3 complaints are chronic apparently.  No neck stiffness, no sore throat.  She denies fever.  Patient denies HIV.    REVIEW OF SYSTEMS    Constitutional: General malaise  Respiratory: No shortness of breath  Cardiac: No chest pain  Gastrointestinal: Nominal pain  Musculoskeletal: Leg pains  Neurologic: Headache, seizure history  Hematologic: Leukopenia, anemia, thrombocytopenia noted on blood work today  Skin: No bruising     All other systems are negative.       PAST MEDICAL HISTORY  Past Medical History:   Diagnosis Date   • Anemia 1/30/2013   • Anxiety 1/19/2019   • Depression, major, recurrent, moderate (HCC) 9/26/2018   • Epilepsy associated with specific stimuli (HCC)    • Head ache    • Seizure (HCC)        FAMILY HISTORY  Family History   Problem Relation Age of Onset   • Hypertension Father        SOCIAL HISTORY  Social History     Socioeconomic History   • Marital status: Single     Spouse name: Not on file   • Number of children: Not on file   • Years of education: Not on file   • Highest education level: Not on file   Occupational History   • Not on file   Social Needs   • Financial resource strain: Not on file   • Food insecurity     Worry: Not on file     Inability: Not on file   •  "Transportation needs     Medical: Not on file     Non-medical: Not on file   Tobacco Use   • Smoking status: Never Smoker   • Smokeless tobacco: Never Used   Substance and Sexual Activity   • Alcohol use: No   • Drug use: No   • Sexual activity: Yes     Partners: Male   Lifestyle   • Physical activity     Days per week: Not on file     Minutes per session: Not on file   • Stress: Not on file   Relationships   • Social connections     Talks on phone: Not on file     Gets together: Not on file     Attends [employer] service: Not on file     Active member of club or organization: Not on file     Attends meetings of clubs or organizations: Not on file     Relationship status: Not on file   • Intimate partner violence     Fear of current or ex partner: Not on file     Emotionally abused: Not on file     Physically abused: Not on file     Forced sexual activity: Not on file   Other Topics Concern   • Not on file   Social History Narrative    ** Merged History Encounter **            SURGICAL HISTORY  No past surgical history on file.    CURRENT MEDICATIONS  Home Medications     Reviewed by Nathaly Quiroga R.N. (Registered Nurse) on 03/12/20 at 1436  Med List Status: Partial   Medication Last Dose Status   Cholecalciferol (VITAMIN D PO)  Active   escitalopram (LEXAPRO) 20 MG tablet  Active   lamoTRIgine (LAMICTAL) 25 MG Tab not longer taking Active   levetiracetam (KEPPRA) 1000 MG tablet 3/12/2020 Active   zonisamide (ZONEGRAN) 100 MG Cap 3/11/2020 Active                ALLERGIES  No Known Allergies    PHYSICAL EXAM  VITAL SIGNS: /77   Pulse 84   Temp 36.7 °C (98.1 °F) (Temporal)   Resp 16   Ht 1.6 m (5' 3\")   Wt 49.4 kg (108 lb 14.5 oz)   SpO2 97%   BMI 19.29 kg/m²   Constitutional:  Non-toxic appearance.   HENT: No facial swelling, no epistaxis  Eyes: Anicteric, no conjunctivitis.  Pupils are equal, no nystagmus, extraocular motions intact  Cardiovascular: Normal heart rate, Normal rhythm, no heart " murmur  Pulmonary:  No wheezing, No rales.   Gastrointestinal: Soft, diffuse tenderness, No palpable mass.  No distention.  Skin: Warm, Dry, No cyanosis.  No petechiae, no asymmetric leg swelling, no cellulitis  Neurologic: Speech is clear, follows commands, facial expression is symmetrical.  Strength and leg strength intact  Psychiatric: Anxious, and cooperative  Musculoskeletal: No flank tenderness.  Bilateral leg tenderness.    EKG/Labs  Results for orders placed or performed during the hospital encounter of 03/12/20   CBC with Differential   Result Value Ref Range    WBC 1.9 (LL) 4.8 - 10.8 K/uL    RBC 4.43 4.20 - 5.40 M/uL    Hemoglobin 10.3 (L) 12.0 - 16.0 g/dL    Hematocrit 32.8 (L) 37.0 - 47.0 %    MCV 74.0 (L) 81.4 - 97.8 fL    MCH 23.3 (L) 27.0 - 33.0 pg    MCHC 31.4 (L) 33.6 - 35.0 g/dL    RDW 44.8 35.9 - 50.0 fL    Platelet Count 118 (L) 164 - 446 K/uL    MPV 9.7 9.0 - 12.9 fL    Neutrophils-Polys 48.10 44.00 - 72.00 %    Lymphocytes 36.90 22.00 - 41.00 %    Monocytes 15.00 (H) 0.00 - 13.40 %    Eosinophils 0.00 0.00 - 6.90 %    Basophils 0.00 0.00 - 1.80 %    Immature Granulocytes 0.00 0.00 - 0.90 %    Nucleated RBC 0.00 /100 WBC    Neutrophils (Absolute) 0.90 (L) 2.00 - 7.15 K/uL    Lymphs (Absolute) 0.69 (L) 1.00 - 4.80 K/uL    Monos (Absolute) 0.28 0.00 - 0.85 K/uL    Eos (Absolute) 0.00 0.00 - 0.51 K/uL    Baso (Absolute) 0.00 0.00 - 0.12 K/uL    Immature Granulocytes (abs) 0.00 0.00 - 0.11 K/uL    NRBC (Absolute) 0.00 K/uL   Complete Metabolic Panel (CMP)   Result Value Ref Range    Sodium 139 135 - 145 mmol/L    Potassium 4.3 3.6 - 5.5 mmol/L    Chloride 107 96 - 112 mmol/L    Co2 23 20 - 33 mmol/L    Anion Gap 9.0 7.0 - 16.0    Glucose 100 (H) 65 - 99 mg/dL    Bun 11 8 - 22 mg/dL    Creatinine 0.62 0.50 - 1.40 mg/dL    Calcium 9.3 8.5 - 10.5 mg/dL    AST(SGOT) 32 12 - 45 U/L    ALT(SGPT) 30 2 - 50 U/L    Alkaline Phosphatase 71 30 - 99 U/L    Total Bilirubin 0.4 0.1 - 1.5 mg/dL    Albumin 4.6 3.2  - 4.9 g/dL    Total Protein 7.1 6.0 - 8.2 g/dL    Globulin 2.5 1.9 - 3.5 g/dL    A-G Ratio 1.8 g/dL   Troponin   Result Value Ref Range    Troponin T <6 6 - 19 ng/L   HCG QUAL SERUM   Result Value Ref Range    Beta-Hcg Qualitative Serum Negative Negative   ESTIMATED GFR   Result Value Ref Range    GFR If African American >60 >60 mL/min/1.73 m 2    GFR If Non African American >60 >60 mL/min/1.73 m 2   EKG   Result Value Ref Range    Report       [facility]    Test Date:  2020  Pt Name:    LEENA BARAJAS     Department: ER  MRN:        [id number]                      Room:  Gender:     Female                       Technician: YANELI  :        1985                   Requested By:ER TRIAGE PROTOCOL  Order #:    [id number]                    Reading MD:    Measurements  Intervals                                Axis  Rate:       71                           P:          73  MA:         128                          QRS:        83  QRSD:       90                           T:          69  QT:         392  QTc:        426    Interpretive Statements  SINUS RHYTHM  PROMINENT P WAVES, NONDIAGNOSTIC  Compared to ECG 2019 06:32:53  No significant changes           RADIOLOGY/PROCEDURES  CT-HEAD W/O   Final Result      No evidence of acute intracranial process.      DX-CHEST-PORTABLE (1 VIEW)   Final Result      No evidence of acute cardiopulmonary process.            COURSE & MEDICAL DECISION MAKING  Pertinent Labs & Imaging studies reviewed. (See chart for details)  Patient is with leukopenia of unknown etiology, referred to the emergency department today for work-up.  Her laboratory evaluation has confirmed the finding with also findings of anemia and thrombocytopenia.  Etiology is unknown.  Complaints of pain, headache, abdominal pain appear to be chronic although may be worse.   line was used, communication still somewhat difficult.  I do not believe  there to be meningitis, she is afebrile, no neck pain or stiffness.  Patient hospitalized for ongoing evaluation and treatment.  Attempts to contact hematologist in the emergency department were unsuccessful.    FINAL IMPRESSION     1. Leukopenia, unspecified type     2. History of seizure     3. Anemia, unspecified type     4. Thrombocytopenia (HCC)                     Electronically signed by: Siddhartha Ronquillo M.D., 3/12/2020 5:59 PM

## 2020-03-12 NOTE — ED TRIAGE NOTES
Pt ambulated to triage with   Chief Complaint   Patient presents with   • Sent by MD     pt getting testing by neurologist with new dx for epilesy.   • Abnormal Labs     low WBC 1.5 on 3/12,      Pt taking zonegran for the last month and a half and when taking she has pain to left side of body and chest pain - when waking in morning and varies on and off how long it last; pt reports that she has only taken that medication once this week but continues the keppra as prescribed.  Pt reports dizziness and seizure and fell  Monday and hit her right side of head, with possible LOC, but unk time d/t pt was alone, no ASA use.  Pt  ans strength, no drift or droop.  Called for EKG d/t pt c/o chest pain, pt reports chest pain continues.   Charge RN informed of pt.  mask in place.  Pt Informed regarding triage process and verbalized understanding to inform triage tech or RN for any changes in condition. Placed in lobby.

## 2020-03-13 PROBLEM — D64.9 ANEMIA: Status: ACTIVE | Noted: 2020-03-13

## 2020-03-13 PROBLEM — D61.818 PANCYTOPENIA (HCC): Status: ACTIVE | Noted: 2020-03-13

## 2020-03-13 PROBLEM — D72.819 LEUKOPENIA: Status: ACTIVE | Noted: 2020-03-13

## 2020-03-13 PROBLEM — R82.71 ASYMPTOMATIC BACTERIURIA: Status: ACTIVE | Noted: 2020-03-13

## 2020-03-13 LAB
ABO GROUP BLD: NORMAL
ALBUMIN SERPL BCP-MCNC: 4.2 G/DL (ref 3.2–4.9)
ALBUMIN SERPL BCP-MCNC: 4.4 G/DL (ref 3.2–4.9)
ALBUMIN/GLOB SERPL: 1.6 G/DL
ALBUMIN/GLOB SERPL: 1.8 G/DL
ALP SERPL-CCNC: 69 U/L (ref 30–99)
ALP SERPL-CCNC: 77 U/L (ref 30–99)
ALT SERPL-CCNC: 28 U/L (ref 2–50)
ALT SERPL-CCNC: 28 U/L (ref 2–50)
ANION GAP SERPL CALC-SCNC: 8 MMOL/L (ref 7–16)
ANION GAP SERPL CALC-SCNC: 9 MMOL/L (ref 7–16)
APTT PPP: 24.1 SEC (ref 24.7–36)
AST SERPL-CCNC: 24 U/L (ref 12–45)
AST SERPL-CCNC: 28 U/L (ref 12–45)
BASOPHILS # BLD AUTO: 0 % (ref 0–1.8)
BASOPHILS # BLD: 0 K/UL (ref 0–0.12)
BILIRUB SERPL-MCNC: 0.4 MG/DL (ref 0.1–1.5)
BILIRUB SERPL-MCNC: 0.4 MG/DL (ref 0.1–1.5)
BLD GP AB SCN SERPL QL: NORMAL
BUN SERPL-MCNC: 14 MG/DL (ref 8–22)
BUN SERPL-MCNC: 19 MG/DL (ref 8–22)
CALCIUM SERPL-MCNC: 9 MG/DL (ref 8.5–10.5)
CALCIUM SERPL-MCNC: 9.2 MG/DL (ref 8.5–10.5)
CHLORIDE SERPL-SCNC: 107 MMOL/L (ref 96–112)
CHLORIDE SERPL-SCNC: 108 MMOL/L (ref 96–112)
CO2 SERPL-SCNC: 25 MMOL/L (ref 20–33)
CO2 SERPL-SCNC: 25 MMOL/L (ref 20–33)
CREAT SERPL-MCNC: 0.71 MG/DL (ref 0.5–1.4)
CREAT SERPL-MCNC: 0.77 MG/DL (ref 0.5–1.4)
CRP SERPL HS-MCNC: 0.05 MG/DL (ref 0–0.75)
EOSINOPHIL # BLD AUTO: 0 K/UL (ref 0–0.51)
EOSINOPHIL NFR BLD: 0 % (ref 0–6.9)
ERYTHROCYTE [DISTWIDTH] IN BLOOD BY AUTOMATED COUNT: 46.3 FL (ref 35.9–50)
ERYTHROCYTE [SEDIMENTATION RATE] IN BLOOD BY WESTERGREN METHOD: 4 MM/HOUR (ref 0–20)
FERRITIN SERPL-MCNC: 5.3 NG/ML (ref 10–291)
FOLATE SERPL-MCNC: 14.6 NG/ML
GLOBULIN SER CALC-MCNC: 2.4 G/DL (ref 1.9–3.5)
GLOBULIN SER CALC-MCNC: 2.8 G/DL (ref 1.9–3.5)
GLUCOSE SERPL-MCNC: 97 MG/DL (ref 65–99)
GLUCOSE SERPL-MCNC: 97 MG/DL (ref 65–99)
HAV IGM SERPL QL IA: NORMAL
HBV CORE IGM SER QL: NORMAL
HBV SURFACE AG SER QL: NORMAL
HCT VFR BLD AUTO: 33.8 % (ref 37–47)
HCV AB SER QL: NORMAL
HGB BLD-MCNC: 10 G/DL (ref 12–16)
HGB RETIC QN AUTO: 25.1 PG/CELL (ref 29–35)
HIV 1+2 AB+HIV1 P24 AG SERPL QL IA: NORMAL
HIV 1+2 AB+HIV1 P24 AG SERPL QL IA: NORMAL
IMM GRANULOCYTES # BLD AUTO: 0 K/UL (ref 0–0.11)
IMM GRANULOCYTES NFR BLD AUTO: 0 % (ref 0–0.9)
IMM RETICS NFR: 15.8 % (ref 9.3–17.4)
INR PPP: 0.99 (ref 0.87–1.13)
IRON SATN MFR SERPL: 3 % (ref 15–55)
IRON SERPL-MCNC: 14 UG/DL (ref 40–170)
LDH SERPL L TO P-CCNC: 174 U/L (ref 107–266)
LYMPHOCYTES # BLD AUTO: 0.75 K/UL (ref 1–4.8)
LYMPHOCYTES NFR BLD: 40.1 % (ref 22–41)
MAGNESIUM SERPL-MCNC: 2.2 MG/DL (ref 1.5–2.5)
MCH RBC QN AUTO: 22.5 PG (ref 27–33)
MCHC RBC AUTO-ENTMCNC: 29.6 G/DL (ref 33.6–35)
MCV RBC AUTO: 76.1 FL (ref 81.4–97.8)
MONOCYTES # BLD AUTO: 0.26 K/UL (ref 0–0.85)
MONOCYTES NFR BLD AUTO: 13.9 % (ref 0–13.4)
NEUTROPHILS # BLD AUTO: 0.86 K/UL (ref 2–7.15)
NEUTROPHILS NFR BLD: 46 % (ref 44–72)
NRBC # BLD AUTO: 0 K/UL
NRBC BLD-RTO: 0 /100 WBC
PLATELET # BLD AUTO: 126 K/UL (ref 164–446)
PMV BLD AUTO: 9.9 FL (ref 9–12.9)
POTASSIUM SERPL-SCNC: 3.9 MMOL/L (ref 3.6–5.5)
POTASSIUM SERPL-SCNC: 4.2 MMOL/L (ref 3.6–5.5)
PROT SERPL-MCNC: 6.6 G/DL (ref 6–8.2)
PROT SERPL-MCNC: 7.2 G/DL (ref 6–8.2)
PROTHROMBIN TIME: 13.2 SEC (ref 12–14.6)
RBC # BLD AUTO: 4.44 M/UL (ref 4.2–5.4)
RETICS # AUTO: 0.07 M/UL (ref 0.04–0.06)
RETICS/RBC NFR: 1.5 % (ref 0.8–2.1)
RH BLD: NORMAL
SODIUM SERPL-SCNC: 141 MMOL/L (ref 135–145)
SODIUM SERPL-SCNC: 141 MMOL/L (ref 135–145)
TIBC SERPL-MCNC: 519 UG/DL (ref 250–450)
VIT B12 SERPL-MCNC: 931 PG/ML (ref 211–911)
WBC # BLD AUTO: 1.9 K/UL (ref 4.8–10.8)

## 2020-03-13 PROCEDURE — 83540 ASSAY OF IRON: CPT

## 2020-03-13 PROCEDURE — C9254 INJECTION, LACOSAMIDE: HCPCS | Performed by: PSYCHIATRY & NEUROLOGY

## 2020-03-13 PROCEDURE — 82746 ASSAY OF FOLIC ACID SERUM: CPT

## 2020-03-13 PROCEDURE — 700102 HCHG RX REV CODE 250 W/ 637 OVERRIDE(OP): Performed by: STUDENT IN AN ORGANIZED HEALTH CARE EDUCATION/TRAINING PROGRAM

## 2020-03-13 PROCEDURE — 96365 THER/PROPH/DIAG IV INF INIT: CPT

## 2020-03-13 PROCEDURE — 99220 PR INITIAL OBSERVATION CARE,LEVL III: CPT | Mod: AI,GC | Performed by: INTERNAL MEDICINE

## 2020-03-13 PROCEDURE — 700111 HCHG RX REV CODE 636 W/ 250 OVERRIDE (IP): Performed by: PSYCHIATRY & NEUROLOGY

## 2020-03-13 PROCEDURE — 83550 IRON BINDING TEST: CPT

## 2020-03-13 PROCEDURE — A9270 NON-COVERED ITEM OR SERVICE: HCPCS | Performed by: STUDENT IN AN ORGANIZED HEALTH CARE EDUCATION/TRAINING PROGRAM

## 2020-03-13 PROCEDURE — 85046 RETICYTE/HGB CONCENTRATE: CPT

## 2020-03-13 PROCEDURE — 82728 ASSAY OF FERRITIN: CPT

## 2020-03-13 PROCEDURE — 80074 ACUTE HEPATITIS PANEL: CPT

## 2020-03-13 PROCEDURE — 85730 THROMBOPLASTIN TIME PARTIAL: CPT

## 2020-03-13 PROCEDURE — 83735 ASSAY OF MAGNESIUM: CPT

## 2020-03-13 PROCEDURE — 700105 HCHG RX REV CODE 258: Performed by: PSYCHIATRY & NEUROLOGY

## 2020-03-13 PROCEDURE — 95720 EEG PHY/QHP EA INCR W/VEEG: CPT | Performed by: PSYCHIATRY & NEUROLOGY

## 2020-03-13 PROCEDURE — 82306 VITAMIN D 25 HYDROXY: CPT

## 2020-03-13 PROCEDURE — 85652 RBC SED RATE AUTOMATED: CPT

## 2020-03-13 PROCEDURE — 95700 EEG CONT REC W/VID EEG TECH: CPT | Performed by: PSYCHIATRY & NEUROLOGY

## 2020-03-13 PROCEDURE — 82607 VITAMIN B-12: CPT

## 2020-03-13 PROCEDURE — 86901 BLOOD TYPING SEROLOGIC RH(D): CPT

## 2020-03-13 PROCEDURE — 83615 LACTATE (LD) (LDH) ENZYME: CPT

## 2020-03-13 PROCEDURE — G0378 HOSPITAL OBSERVATION PER HR: HCPCS

## 2020-03-13 PROCEDURE — 85610 PROTHROMBIN TIME: CPT

## 2020-03-13 PROCEDURE — 95714 VEEG EA 12-26 HR UNMNTR: CPT | Performed by: PSYCHIATRY & NEUROLOGY

## 2020-03-13 PROCEDURE — 80053 COMPREHEN METABOLIC PANEL: CPT

## 2020-03-13 PROCEDURE — 86850 RBC ANTIBODY SCREEN: CPT

## 2020-03-13 PROCEDURE — 86140 C-REACTIVE PROTEIN: CPT

## 2020-03-13 PROCEDURE — 86900 BLOOD TYPING SEROLOGIC ABO: CPT

## 2020-03-13 PROCEDURE — 99214 OFFICE O/P EST MOD 30 MIN: CPT | Mod: 25 | Performed by: PSYCHIATRY & NEUROLOGY

## 2020-03-13 PROCEDURE — 85025 COMPLETE CBC W/AUTO DIFF WBC: CPT

## 2020-03-13 PROCEDURE — 36415 COLL VENOUS BLD VENIPUNCTURE: CPT

## 2020-03-13 PROCEDURE — 87389 HIV-1 AG W/HIV-1&-2 AB AG IA: CPT

## 2020-03-13 RX ORDER — LORAZEPAM 2 MG/ML
4 INJECTION INTRAMUSCULAR
Status: DISCONTINUED | OUTPATIENT
Start: 2020-03-13 | End: 2020-03-14

## 2020-03-13 RX ORDER — FERROUS SULFATE 325(65) MG
325 TABLET ORAL
Status: DISCONTINUED | OUTPATIENT
Start: 2020-03-14 | End: 2020-03-18

## 2020-03-13 RX ORDER — MIDAZOLAM HYDROCHLORIDE 1 MG/ML
1-4 INJECTION INTRAMUSCULAR; INTRAVENOUS ONCE
Status: DISCONTINUED | OUTPATIENT
Start: 2020-03-14 | End: 2020-03-14

## 2020-03-13 RX ADMIN — ACETAMINOPHEN 650 MG: 325 TABLET, FILM COATED ORAL at 18:02

## 2020-03-13 RX ADMIN — SODIUM CHLORIDE 100 MG: 9 INJECTION, SOLUTION INTRAVENOUS at 21:19

## 2020-03-13 RX ADMIN — SENNOSIDES AND DOCUSATE SODIUM 2 TABLET: 8.6; 5 TABLET ORAL at 17:57

## 2020-03-13 RX ADMIN — LEVETIRACETAM 1000 MG: 500 TABLET ORAL at 17:57

## 2020-03-13 SDOH — SOCIAL STABILITY: SOCIAL INSECURITY
WITHIN THE LAST YEAR, HAVE YOU BEEN KICKED, HIT, SLAPPED, OR OTHERWISE PHYSICALLY HURT BY YOUR PARTNER OR EX-PARTNER?: PATIENT DECLINED

## 2020-03-13 SDOH — SOCIAL STABILITY: SOCIAL INSECURITY: WITHIN THE LAST YEAR, HAVE YOU BEEN AFRAID OF YOUR PARTNER OR EX-PARTNER?: PATIENT DECLINED

## 2020-03-13 SDOH — SOCIAL STABILITY: SOCIAL INSECURITY
WITHIN THE LAST YEAR, HAVE TO BEEN RAPED OR FORCED TO HAVE ANY KIND OF SEXUAL ACTIVITY BY YOUR PARTNER OR EX-PARTNER?: PATIENT DECLINED

## 2020-03-13 SDOH — SOCIAL STABILITY: SOCIAL INSECURITY
WITHIN THE LAST YEAR, HAVE YOU BEEN HUMILIATED OR EMOTIONALLY ABUSED IN OTHER WAYS BY YOUR PARTNER OR EX-PARTNER?: PATIENT DECLINED

## 2020-03-13 ASSESSMENT — ENCOUNTER SYMPTOMS
PALPITATIONS: 0
NECK PAIN: 1
CLAUDICATION: 0
SEIZURES: 1
HALLUCINATIONS: 0
CHILLS: 0
BLURRED VISION: 0
WEIGHT LOSS: 0
NERVOUS/ANXIOUS: 1
ABDOMINAL PAIN: 0
SORE THROAT: 0
FALLS: 0
HEMOPTYSIS: 0
CONSTIPATION: 0
COUGH: 0
HEADACHES: 0
BLOOD IN STOOL: 0
DIZZINESS: 0
DIARRHEA: 0
NAUSEA: 0
SHORTNESS OF BREATH: 0
DEPRESSION: 1
DOUBLE VISION: 0
SENSORY CHANGE: 1
DEPRESSION: 0
BACK PAIN: 1
VOMITING: 0
WEAKNESS: 0
MEMORY LOSS: 1
FEVER: 0

## 2020-03-13 ASSESSMENT — LIFESTYLE VARIABLES: SUBSTANCE_ABUSE: 0

## 2020-03-13 NOTE — ED NOTES
PT AMBULATED TO RESTROOM TO PROVIDE URINE SAMPLE. NO DIFFICULTY WITH AMBULATION OBSERVED BY THIS RN.

## 2020-03-13 NOTE — ASSESSMENT & PLAN NOTE
- Patient has depression, per notes has refused psychiatry/psychology   - previously on lexapro, patient stated she continues to take but not listed on medication reconciliation.  - Prozac 20 mg.

## 2020-03-13 NOTE — ED NOTES
Med Rec Updated and Complete per Pt at bedside with some RX bottles (returned)  Allergies Reviewed  No PO ABX last 14 days.    Pt taking Keppra 1g twice daily.

## 2020-03-13 NOTE — ASSESSMENT & PLAN NOTE
- Patient has noted mild anemia, leukopenia, mild thrombocytopenia  - Likely acute-subacute related to medication effect with zonisamide but can not rule out hematologic malignancy or infiltrative bone marrow process.  - HIV, hepatitis panel , B12/Folate all WNL.  - Iron studies c/w iron deficiency anaemia.  - BM biopsy reported : iron deficiency anemia  .  @Plan:  - Continue to hold zonisamide  - on IV iron -- discharge with PO every other day iron
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
## 2020-03-13 NOTE — ASSESSMENT & PLAN NOTE
- Patient on keppra and zonisamide( previously on lamotrigine ,stopped on 01/29 due to side effects).  - Frequent seizures despite being on medication.  - Neurology on board, appreciate recommendations.   - differential include true epileptic seizure vs. Psychogenic seizure.  - TTE with bubble with no evidence of shunt.  - MRI brain with no changes from previous one.  @Plan:  - Seizure precautions  - Ativan PRN for breakthrough.  - Neurology changed IV to PO Lacosamide 200 mg, Brivaracetam 100 mg, Perampanel 2 mg

## 2020-03-13 NOTE — PROGRESS NOTES
Two RN skin check: No Skin concerns noted. Admission questions and assessment completed. Pt informed of plan of care, all questions and concerns addressed. Certified  MIKE Phipps, used for translation/communication.      Juvencio Schmid RN.

## 2020-03-13 NOTE — ASSESSMENT & PLAN NOTE
- Patient has symptoms of Dysuria with frequency   - Urinalysis with evidence of UTI.  - Pending urine culture.  - patient developed acute right sided pain this morning=>will order CT renal protocol.  - will treat with Augmentin+ pyridium for dysuria + PRN Oxycodone for lank pain.

## 2020-03-13 NOTE — SENIOR ADMIT NOTE
This is a 34-year-old female, admitted for significant leukopenia, general malaise and status post seizure, after being sent to [facility] by her neurologist.  Chronic medical problems include seizure disorder and chronic pancytopenia, however all cell lines not normally this low. No recent travel. No ETOH use.        Afebrile.  Hemodynamically stable.  RA  Thin, well-appearing, Sudanese-speaking female, communicative but tearful and emotional at times.  Pallor.  RRR without murmur.  Clear lungs.  Soft nontender abdomen.  No petechiae/purpura.        Assessment and plan:  1.  Pancytopenia, acute on chronic, severe  2.  Seizure disorder, uncontrolled (last seizure 3/11/2020, witnessed)  3.  Anxiety/depression    -Unclear etiology at this point, underlying hematologic malignancy may have been exacerbated by anticonvulsant.  Also considered bone marrow aplasia from nutritional disorders or infectious disease; B12, folate, HIV, Hep panel pending  -Spoke with pathology regarding blood smear.  Need to get bone marrow biopsy.  Hematology consult to be contacted by the day team  -Hold zonisamide, given bone marrow adverse effects.  Continue Keppra. Seizure order set.   -Need to get antidepressants on board

## 2020-03-13 NOTE — ASSESSMENT & PLAN NOTE
- Hgb 10 on admission.  - iron studies C/W iron deficiency anaemia.  - most likely from heavy menstrual periods.  - Folate/B12 levels WNL.   - BM biopsy unremarkable except for findings C/W iron store depletion.  @Plan:-  - IV iron replacement..  - will CTM.

## 2020-03-13 NOTE — CARE PLAN
Problem: Safety  Goal: Will remain free from injury  Outcome: PROGRESSING AS EXPECTED  Note: Educated pt to use call button to call for help before getting up. Bed rails up x2. Provided hospital non-slip socks. Bed locked and at the lowest position. Bed alarm on. Call light and personal belongings within reach. Ambulates with no assistance required.     Problem: Venous Thromboembolism (VTW)/Deep Vein Thrombosis (DVT) Prevention:  Goal: Patient will participate in Venous Thrombosis (VTE)/Deep Vein Thrombosis (DVT)Prevention Measures  Outcome: PROGRESSING AS EXPECTED  Note: Pt ambulating in her room.     Problem: Knowledge Deficit  Goal: Knowledge of the prescribed therapeutic regimen will improve  Outcome: PROGRESSING AS EXPECTED  Note: Utilized iPad  to provide education about treatment plan. Pt verbalized understanding.

## 2020-03-13 NOTE — PROGRESS NOTES
Daily Progress Note:     Date of Service: 3/13/2020  Primary Team: [facility] Green Team   Attending: Fabian Quinn M.D.   Senior Resident: Dr. Alcaraz  Intern: Dr. De La Torre.  Contact:  [phone]    Chief Complaint:   Abnormal labs (WBCs of 1.9).    Patient ID:  ---------------  34 years old female with past medical history significant for seizure disorder(temporal lobe epilepsy) on Keppra and Zonisamide, anxiety/depression on Escitalopram, HIT.  Admitted to the hospital after she was found to have pancytopenia with WBCs of 1.9/ANC 0.81, patient was recently switched from lamotrigine to zonisamide after she developed side effect(rash).  Patient has had frequent convulsion despite being on medication.patient states that for the last week she has not been taking her medications as prescribed.  Patient had 2 episodes of seizure last week(03/09 and 03/11).  Patient is admitted for further evaluation of pancytopenia and antiepileptic medication adjustment.    Subjective:  - No acute events overnight  - VSS.  - Patient is complaining of pain involving her left side of face left arm and leg.  - Labs were remarkable for hemoglobin of 10, platelets at 126, WBCs 1.9.  Iron studies showed evidence of iron deficiency anemia, sed rate of 4, CRP of 0.05, normal serum B12 /Folate levels, HIV nonreactive, hep panel non-reactive.  - Neurology on board, recommended holding zonisamide and lamotrigine and continue with Keppra for now.  - - patient had seizure like activity this morning while we were interviewing her, Neurology recommended 24-hour EEG to see if its true seizure activity vs. Psychogenic seizure.  - patient denied nay heavy menstrual cycles, states her cycles usually last for 7 days and she uses up to 8 pads during her menses. No bleeding from other orifices and she is not aware of similar diagnosis in the past =>will start patient on oral iron supplements.   - Pending BM biopsy.  - heme/onc on board, appreciate  recommendations.     Consultants/Specialty:  - Neurology.  - Heme/onc.    Review of Systems:    Review of Systems   Constitutional: Negative for chills, fever and weight loss.   Eyes: Negative for double vision.   Respiratory: Negative for cough, hemoptysis and shortness of breath.    Cardiovascular: Negative for palpitations, claudication and leg swelling.   Gastrointestinal: Negative for blood in stool, diarrhea, nausea and vomiting.   Genitourinary: Negative for dysuria, frequency, hematuria and urgency.   Musculoskeletal: Positive for back pain and neck pain.   Skin: Negative for itching and rash.   Neurological: Positive for sensory change (pain in the left side of the body.) and seizures. Negative for weakness and headaches.   Psychiatric/Behavioral: Negative for depression, hallucinations and suicidal ideas.       Objective Data:   Physical Exam:   Vitals:   Temp:  [35.8 °C (96.5 °F)-37 °C (98.6 °F)] 37 °C (98.6 °F)  Pulse:  [63-91] 84  Resp:  [16-17] 17  BP: (109-137)/(62-77) 121/68  SpO2:  [95 %-100 %] 95 %     Physical Exam  Vitals signs and nursing note reviewed.   Constitutional:       General: She is not in acute distress.     Appearance: Normal appearance. She is not ill-appearing.   HENT:      Head: Normocephalic and atraumatic.      Nose: Nose normal. No congestion or rhinorrhea.   Eyes:      Extraocular Movements: Extraocular movements intact.      Conjunctiva/sclera: Conjunctivae normal.      Pupils: Pupils are equal, round, and reactive to light.   Cardiovascular:      Rate and Rhythm: Normal rate and regular rhythm.      Pulses: Normal pulses.      Heart sounds: Normal heart sounds. No murmur. No friction rub. No gallop.    Pulmonary:      Effort: Pulmonary effort is normal. No respiratory distress.      Breath sounds: Normal breath sounds. No wheezing or rales.   Abdominal:      General: Abdomen is flat. Bowel sounds are normal. There is no distension.      Palpations: Abdomen is soft.       Tenderness: There is no abdominal tenderness.   Musculoskeletal:      Right lower leg: No edema.      Left lower leg: No edema.   Skin:     Coloration: Skin is not jaundiced.      Findings: No bruising.   Neurological:      General: No focal deficit present.      Mental Status: She is alert and oriented to person, place, and time.      Cranial Nerves: No cranial nerve deficit.   Psychiatric:      Comments: Tearful.           Labs:   Lab Results   Component Value Date/Time    WBC 1.9 (LL) 03/13/2020 03:43 AM    RBC 4.44 03/13/2020 03:43 AM    HEMOGLOBIN 10.0 (L) 03/13/2020 03:43 AM    HEMATOCRIT 33.8 (L) 03/13/2020 03:43 AM    MCV 76.1 (L) 03/13/2020 03:43 AM    MCH 22.5 (L) 03/13/2020 03:43 AM    MCHC 29.6 (L) 03/13/2020 03:43 AM    MPV 9.9 03/13/2020 03:43 AM    NEUTSPOLYS 46.00 03/13/2020 03:43 AM    LYMPHOCYTES 40.10 03/13/2020 03:43 AM    MONOCYTES 13.90 (H) 03/13/2020 03:43 AM    EOSINOPHILS 0.00 03/13/2020 03:43 AM    BASOPHILS 0.00 03/13/2020 03:43 AM    HYPOCHROMIA 1+ 02/04/2013 05:48 AM    ANISOCYTOSIS 3+ 03/12/2020 06:35 AM      Lab Results   Component Value Date/Time    SODIUM 141 03/13/2020 03:43 AM    POTASSIUM 4.2 03/13/2020 03:43 AM    CHLORIDE 108 03/13/2020 03:43 AM    CO2 25 03/13/2020 03:43 AM    GLUCOSE 97 03/13/2020 03:43 AM    BUN 19 03/13/2020 03:43 AM    CREATININE 0.77 03/13/2020 03:43 AM    CREATININE 0.8 12/04/2008 04:45 PM      Lab Results   Component Value Date/Time    PROTHROMBTM 13.2 03/13/2020 08:47 AM    INR 0.99 03/13/2020 08:47 AM     Imaging:   CT-HEAD W/O   Final Result      No evidence of acute intracranial process.      DX-CHEST-PORTABLE (1 VIEW)   Final Result      No evidence of acute cardiopulmonary process.            * Pancytopenia (HCC)- (present on admission)  Assessment & Plan  - Patient has noted mild anemia, leukopenia, mild thrombocytopenia  - Likely acute-subacute related to medication effect with zonisamide but can not rule out hematologic malignancy or  infiltrative bone marrow process.  - HIV, hepatitis panel , B12/Folate all WNL.  - Iron studies c/w iron deficiency anaemia.  -No peripheral smear abnormalities concerning for malignancy but paucicellular per discussion with pathologist Dr. Marcelino who recommends bone marrow biopsy.  - oncology on board, appreciate their recs.  @Plan:  - NPO at [facility] for BM biopsy in AM.  - Continue to hold zonisamide  - pending BM biopsy.  - CTM for s/s.    Seizure disorder (HCC)- (present on admission)  Assessment & Plan  - Patient on keppra and zonisamide( previously on lamotrigine ,stopped on 01/29 due to side effects).  - Frequent seizures despite being on medication.  - Neurology on board, appreciate recommendations.   - differential include true epileptic seizure vs. Psychogenic seizure.  @Plan:  -Seizure precautions  -Ativan PRN for breakthrough  -Continue keppra, stop zonisamide  -24 hr EEG.    Depression, major, recurrent, moderate (HCC)- (present on admission)  Assessment & Plan  - Patient has depression, per notes has refused psychiatry/psychology   - previously on lexapro, patient stated she continues to take but not listed on medication reconciliation    Iron defeciency anemia- (present on admission)  Assessment & Plan  - Hgb 10 on admission.  - iron studies C/W iron deficiency anaemia.  - patient denies nay heavy menstrual periods,bleeding from other orifices or Phx of similar condition.  - Folate/B12 levels WNL.   @Plan:-  - PO iron supplements.  - Pending BM biopsy.  - will CTM.      Asymptomatic bacteriuria- (present on admission)  Assessment & Plan  - Patient has no urinary symptoms despite +LE and bacteria  @Plan:  - No treatment at this time

## 2020-03-13 NOTE — PROCEDURES
VIDEO ELECTROENCEPHALOGRAM REPORT      Referring provider: Dr. Ho.     DOS: 3/13/2020 (total recording of 17 hours and 17 minutes).     INDICATION:  Jennifer Lopez 34 y.o. female presenting with seizures.     CURRENT ANTIEPILEPTIC REGIMEN: Levetiracetam 1000 mg bid. Vimpat 100 mg q 12 hrs added during the study.     TECHNIQUE: 30 channel video electroencephalogram (EEG) was performed in accordance with the international 10-20 system. The study was reviewed in bipolar and referential montages. The recording examined the patient during wakeful and drowsy/sleep state(s).     DESCRIPTION OF THE RECORD:  During the wakefulness, the background showed a symmetrical 10 Hz alpha activity posteriorly with amplitude of 70 mV.  There was reactivity to eye closure/opening.  A normal anterior-posterior gradient was noted with faster beta frequencies seen anteriorly.  During drowsiness, theta/delta frequencies were seen.    During the sleep state, background shows diffuse high-amplitude 4-5 Hz delta activity.  Symmetrical high-amplitude sleep spindles and vertex sharps were seen in the leads over the central regions.     ACTIVATION PROCEDURES:   Hyperventilation was performed, but failed to produce any significant background changes.     Intermittent Photic stimulation was performed in a stepwise fashion from 1 to 30 Hz and elicited a normal response (photic driving), most noticeable in the posterior leads.      ICTAL AND/OR INTERICTAL FINDINGS:   Frequent, independent bitemporal sharps and intermittent bitemporal slowing noted. Frequent, brief, non convulsive seizures with onset on either right or left temporal chains, with seizures spreading bitemporally fast, and becoming more prominent on the right temporal chain. The patient appears confused / staring during the seizures, the most prominent of the seizures captured overnight, exhibited higher amplitude and build up on the right temporal region and clinically the  patient had behavioral arrest, head was mildly deviated to the right, right hand was up with automatisms and post seizure she was noted wiping her nose with the right hand, with a fast post ictal state and recovery.     EKG: sampling of the EKG recording demonstrated sinus rhythm.     EVENTS:  See above. Most seizures were not marked for review.     INTERPRETATION:  This is an abnormal video EEG recording in the awake, drowsy, and sleep state(s).  Frequent, independent bitemporal sharps and intermittent bitemporal slowing noted. Frequent, brief, non convulsive seizures with onset on either right or left temporal chains, with seizures spreading bitemporally fast, but typically becoming more prominent on the right temporal chain. The patient appears confused / staring during the seizures, with the most prominent of the seizures captured overnight, exhibited higher amplitude and build up on the right temporal region and clinically the patient had behavioral arrest, head was mildly deviated to the right, right hand was up with automatisms and post seizure she was noted wiping her nose with the right hand, with a fast post ictal state and recovery. The findings suggest multifocal, refractory epilepsy with bitemporal seizures. Clinical and radiological correlation is recommended.    Updates provided to Dr. Ho.         Dimitry Munoz MD   Epilepsy and Neurodiagnostics.   Clinical  of Neurology [facility] [employer].   Diplomate in Neurology, Epilepsy, and Electrodiagnostic Medicine.   Office: [phone]  Fax: [phone]

## 2020-03-13 NOTE — H&P
History & Physical Note    Date of Admission: 3/12/2020  Admission Status: Observation-Outpatient  [facility] Team: [facility]  Attending: Dr. Quinn  Senior Resident: Dr. Carrera  Intern: Josep Kruger D.O.    Contact Number: [phone]    Chief Complaint: Abnormally low WBC on labs     History of Present Illness (HPI):   Jennifer is a 34 y.o. female who presented 3/12/2020 with abnormally low WBC on recent labs. Patient has a PMH of depression and a seizure disorder with breakthrough seizures despite keppra and subsequent memory loss.     Patient has been having breakthrough seizures and was tried on lamictal (discontinued due to rash) then zonisamide 1/29 alongside keppra for additional control. However, she continues to have seizures, including 3/9 and 3/11 when she felt dizzy and fell with short period of LOC and biting of bilateral cheeks without incontinence, the one on the 11th was witnessed by a friend. She has been complaining of pain from the left side of her face down the entire left side of her body including arm and leg/foot that she attributes to zonisamide and has only taken it 1 time this week with moderate improvement.    Patient had a lab draw today for followup of her medication change from lamictal to zonisamide and was found to have pancytopenia with WBC 1.5 (previously 4), Hgb 10 (previously 13-14), Platelets 121 (previously 150), with differential showing ANC .7, absolute lymphocytes .55, monocytes 16.1 without bands. Patient was sent to the ED to be evaluated by neurologist. She denies any acute symptoms, weakness, fatigue, weight loss, SOB, cough, change in bleeding/bruising, recent infection requiring antibiotics.    In the ED, patient was afebrile with stable vitals. Repeat labs were similar to labs done earlier in the day with negative UPT, CXR, EKG, CT head, and UA shows moderate LE, bacteria while patient denies urinary sx.    Patient is Tongan-speaking only, history was obtained  through video  service. She has no history of cancer or family history of cancer and has started no other new medications besides zonisamide. History is difficult as patient has memory difficulties and anxiety.    Review of Systems:   Review of Systems   Constitutional: Negative for chills, fever, malaise/fatigue and weight loss.   HENT: Negative for congestion and sore throat.    Eyes: Negative for blurred vision and double vision.   Respiratory: Negative for cough and shortness of breath.    Cardiovascular: Negative for chest pain and palpitations.   Gastrointestinal: Negative for abdominal pain, constipation, diarrhea, nausea and vomiting.   Genitourinary: Negative for dysuria, frequency and urgency.   Musculoskeletal: Positive for back pain and neck pain (severe). Negative for falls and joint pain.   Skin: Negative for itching and rash.   Neurological: Positive for seizures. Negative for dizziness, weakness and headaches.   Psychiatric/Behavioral: Positive for depression and memory loss. Negative for substance abuse and suicidal ideas. The patient is nervous/anxious.        Past Medical History:   Past Medical History was reviewed with patient.   has a past medical history of Anemia (1/30/2013), Anxiety (1/19/2019), Depression, major, recurrent, moderate (HCC) (9/26/2018), Epilepsy associated with specific stimuli ([facility]), Head ache, and Seizure ([facility]). She also has no past medical history of Addisons disease ([facility]), Adrenal disorder ([facility]), Allergy, Blood transfusion, Clotting disorder ([facility]), Cushings syndrome ([facility]), Diabetes ([facility]), Diabetic neuropathy ([facility]), EMPHYSEMA, GERD (gastroesophageal reflux disease), Goiter, Headache(784.0), HIV (human immunodeficiency virus infection), Hyperlipidemia, Hypertension, IBD (inflammatory bowel disease), Meningitis, Migraine, Muscle disorder, OSTEOPOROSIS, Parathyroid disorder (HCC), Pituitary disease (HCC), Sickle cell disease (HCC), Substance abuse (HCC), Thyroid  disease, Tuberculosis, Ulcer, or Urinary tract infection, site not specified.    Past Surgical History:   Past Surgical History was reviewed with patient.   has no past surgical history on file.    Medications:   Medications have been reviewed with patient.  Prior to Admission Medications   Prescriptions Last Dose Informant Patient Reported? Taking?   Cholecalciferol 125 MCG (5000 UT) Tab 3/12/2020 at AM Rx Bottle (For Med Information) Yes Yes   Sig: Take 5,000 Units by mouth every day.   ibuprofen (MOTRIN) 200 MG Tab 3/12/2020 at PRN Patient Yes Yes   Sig: Take 200 mg by mouth every 6 hours as needed for Mild Pain.   levetiracetam (KEPPRA) 1000 MG tablet 3/12/2020 at AM Rx Bottle (For Med Information) Yes Yes   Sig: Take 1,000 mg by mouth 2 Times a Day.   zonisamide (ZONEGRAN) 100 MG Cap 3/5/2020 at PRN Rx Bottle (For Med Information) Yes Yes   Sig: Take 100 mg by mouth every bedtime.      Facility-Administered Medications: None        Allergies:   Allergies have been reviewed with patient.  No Known Allergies    Family History:   No family hx of cancers, hematologic diseases,   family history includes Hypertension in her father.     Social History:   Tobacco: Never  Alcohol: Never   Recreational drugs (illegal and prescription):  Denies   Employment: unemployed  Activity Level: Low secondary to recent pain, seizures   Living situation:  Lives with  in [place], [place]  Recent travel:  None over last few months  Other (stressors): Patient has depression, concerned that  may be cheating on her  OB/sexual: Patient is sexually active with , no use of contraceptives, . Seizures are worse around time of period.  Primary Care Provider: reviewed Pcp Pt States None, follows mainly with Dr. Cheatham of neurology    Objective:  Physical Exam:  Temp:  [36.7 °C (98 °F)-36.8 °C (98.3 °F)] 36.8 °C (98.3 °F)  Pulse:  [65-91] 91  Resp:  [16] 16  BP: (112-137)/(65-77) 121/65  SpO2:  [95 %-100 %] 100  "%    Physical Exam  Constitutional:       General: She is not in acute distress.     Appearance: Normal appearance. She is not toxic-appearing.      Comments: Patient is thin, appears well, healthy but anxious in no acute distress   HENT:      Head: Normocephalic and atraumatic.      Mouth/Throat:      Mouth: Mucous membranes are moist.      Pharynx: Oropharynx is clear. No oropharyngeal exudate or posterior oropharyngeal erythema.   Eyes:      Extraocular Movements: Extraocular movements intact.      Pupils: Pupils are equal, round, and reactive to light.   Neck:      Musculoskeletal: Normal range of motion and neck supple. Muscular tenderness (intense paraspinal and trapezius tenderness causing \"shooting pains down feet\") present. No neck rigidity.   Cardiovascular:      Rate and Rhythm: Normal rate and regular rhythm.      Pulses: Normal pulses.      Heart sounds: Normal heart sounds. No murmur. No friction rub. No gallop.    Pulmonary:      Effort: Pulmonary effort is normal. No respiratory distress.      Breath sounds: Normal breath sounds. No wheezing or rales.   Abdominal:      General: Abdomen is flat. Bowel sounds are normal. There is no distension.      Palpations: Abdomen is soft. There is no mass.      Tenderness: There is no abdominal tenderness.   Musculoskeletal: Normal range of motion.         General: No swelling or tenderness.   Skin:     General: Skin is warm and dry.      Capillary Refill: Capillary refill takes less than 2 seconds.      Coloration: Skin is not jaundiced or pale.      Findings: No bruising, erythema or rash.   Neurological:      General: No focal deficit present.      Mental Status: She is alert and oriented to person, place, and time.      Sensory: No sensory deficit.      Motor: No weakness.      Comments: Unknown baseline, patient has memory recall problems affecting history taking   Psychiatric:      Comments: Patient has anxious mood, behavior. Will veer off topic and " discuss tangential things per  service         Labs:   Recent Labs     03/12/20  0635 03/12/20  1530   WBC 1.5* 1.9*   RBC 4.46 4.43   HEMOGLOBIN 10.0* 10.3*   HEMATOCRIT 34.1* 32.8*   MCV 76.5* 74.0*   MCH 22.4* 23.3*   RDW 45.6 44.8   PLATELETCT 121* 118*   MPV 10.3 9.7   NEUTSPOLYS 47.00 48.10   LYMPHOCYTES 36.90 36.90   MONOCYTES 16.10* 15.00*   EOSINOPHILS 0.00 0.00   BASOPHILS 0.00 0.00   RBCMORPHOLO Present  --      Recent Labs     03/12/20  0635 03/12/20  1530   SODIUM 141 139   POTASSIUM 3.9 4.3   CHLORIDE 107 107   CO2 25 23   GLUCOSE 97 100*   BUN 14 11       Recent Labs     03/12/20  1530   ALBUMIN 4.6   TBILIRUBIN 0.4   ALKPHOSPHAT 71   TOTPROTEIN 7.1   ALTSGPT 30   ASTSGOT 32   CREATININE 0.62        EKG: Per my read, Normal Sinus Rhythm  HR: 71  QTC: 426  Clinical Impression: no acute changes    Imaging:   CT-HEAD W/O   Final Result      No evidence of acute intracranial process.      DX-CHEST-PORTABLE (1 VIEW)   Final Result      No evidence of acute cardiopulmonary process.          Previous Data Review: reviewed    Assessment and Plan:  * Pancytopenia (HCC)  Assessment & Plan  Patient has noted mild anemia, leukopenia, mild thrombocytopenia  -Likely acute-subacute related to medication effect with zonisamide but can not rule out hematologic malignancy or infiltrative bone marrow process  -potentially infectious cause as well including HIV, hepatitis, parvovirus  -No peripheral smear abnormalities concerning for malignancy but paucicellular per discussion with pathologist Dr. Marcelino who recommends bone marrow biopsy    Plan:  -Admit patient to obs  -Discuss with Hematology/Oncology in am about bone marrow biopsy to evaluate, confirm with lab/histology #[id number] that correct exams are sent per pathology recommendation if chosen  -NPO for potential biopsy  -Discontinue zonisamide  -HIV, hepatitis panel    Seizure disorder (HCC)- (present on admission)  Assessment & Plan  Patient on keppra and  previously zonisamide    Plan:  -Seizure precautions  -Ativan PRN for breakthrough  -Continue keppra, stop zonisamide  -Consider neurologic consult for starting new     Anemia  Assessment & Plan  Hgb 10  -Appears acute and normocytic, but does have history of anemia and B12 deficiency, likely part of pancytopenia spectrum    Plan:  -Ferritin, iron studies, B12, folate  -Reticulocyte count to see if bone marrow is creating new RBC's  -PT/PTT, get type and cross    Asymptomatic bacteriuria  Assessment & Plan  Patient has no urinary symptoms despite +LE and bacteria    Plan:  -No treatment at this time    Leukopenia  Assessment & Plan  Patient has significant leukopenia including lymphopenia and neutropenia but with preserved monocytes  -likely medication induced, can not rule out malignancy or infiltrative bone marrow process    Plan:  -Bone marrow biopsy to follow  -Discontinue zonisamide  -Watch for s/s infection, monitor ANC <500

## 2020-03-13 NOTE — ASSESSMENT & PLAN NOTE
Patient has significant leukopenia including lymphopenia and neutropenia but with preserved monocytes  -likely medication induced, can not rule out malignancy or infiltrative bone marrow process    Plan:  -Bone marrow biopsy to follow  -Discontinue zonisamide  -Watch for s/s infection, monitor ANC <500

## 2020-03-14 ENCOUNTER — APPOINTMENT (OUTPATIENT)
Dept: RADIOLOGY | Facility: MEDICAL CENTER | Age: 35
DRG: 809 | End: 2020-03-14
Attending: STUDENT IN AN ORGANIZED HEALTH CARE EDUCATION/TRAINING PROGRAM
Payer: MEDICAID

## 2020-03-14 LAB
25(OH)D3 SERPL-MCNC: 53 NG/ML (ref 30–100)
BACTERIA UR CULT: ABNORMAL
BACTERIA UR CULT: ABNORMAL
BASOPHILS # BLD AUTO: 0 % (ref 0–1.8)
BASOPHILS # BLD: 0 K/UL (ref 0–0.12)
EOSINOPHIL # BLD AUTO: 0 K/UL (ref 0–0.51)
EOSINOPHIL NFR BLD: 0 % (ref 0–6.9)
ERYTHROCYTE [DISTWIDTH] IN BLOOD BY AUTOMATED COUNT: 45.3 FL (ref 35.9–50)
HCT VFR BLD AUTO: 32.8 % (ref 37–47)
HGB BLD-MCNC: 10.1 G/DL (ref 12–16)
IMM GRANULOCYTES # BLD AUTO: 0 K/UL (ref 0–0.11)
IMM GRANULOCYTES NFR BLD AUTO: 0 % (ref 0–0.9)
LYMPHOCYTES # BLD AUTO: 0.9 K/UL (ref 1–4.8)
LYMPHOCYTES NFR BLD: 37.3 % (ref 22–41)
MCH RBC QN AUTO: 23.2 PG (ref 27–33)
MCHC RBC AUTO-ENTMCNC: 30.8 G/DL (ref 33.6–35)
MCV RBC AUTO: 75.2 FL (ref 81.4–97.8)
MONOCYTES # BLD AUTO: 0.27 K/UL (ref 0–0.85)
MONOCYTES NFR BLD AUTO: 11.2 % (ref 0–13.4)
NEUTROPHILS # BLD AUTO: 1.24 K/UL (ref 2–7.15)
NEUTROPHILS NFR BLD: 51.5 % (ref 44–72)
NRBC # BLD AUTO: 0 K/UL
NRBC BLD-RTO: 0 /100 WBC
PATHOLOGY CONSULT NOTE: NORMAL
PLATELET # BLD AUTO: 127 K/UL (ref 164–446)
PMV BLD AUTO: 9.5 FL (ref 9–12.9)
RBC # BLD AUTO: 4.36 M/UL (ref 4.2–5.4)
SIGNIFICANT IND 70042: ABNORMAL
SITE SITE: ABNORMAL
SOURCE SOURCE: ABNORMAL
WBC # BLD AUTO: 2.4 K/UL (ref 4.8–10.8)

## 2020-03-14 PROCEDURE — 4A10X4Z MONITORING OF CENTRAL NERVOUS ELECTRICAL ACTIVITY, EXTERNAL APPROACH: ICD-10-PCS | Performed by: PSYCHIATRY & NEUROLOGY

## 2020-03-14 PROCEDURE — 36415 COLL VENOUS BLD VENIPUNCTURE: CPT

## 2020-03-14 PROCEDURE — 700111 HCHG RX REV CODE 636 W/ 250 OVERRIDE (IP): Performed by: HOSPITALIST

## 2020-03-14 PROCEDURE — 07DR3ZX EXTRACTION OF ILIAC BONE MARROW, PERCUTANEOUS APPROACH, DIAGNOSTIC: ICD-10-PCS | Performed by: HOSPITALIST

## 2020-03-14 PROCEDURE — 88360 TUMOR IMMUNOHISTOCHEM/MANUAL: CPT | Mod: 91

## 2020-03-14 PROCEDURE — 700101 HCHG RX REV CODE 250: Performed by: PSYCHIATRY & NEUROLOGY

## 2020-03-14 PROCEDURE — 88313 SPECIAL STAINS GROUP 2: CPT | Mod: 91

## 2020-03-14 PROCEDURE — 88305 TISSUE EXAM BY PATHOLOGIST: CPT

## 2020-03-14 PROCEDURE — 700105 HCHG RX REV CODE 258: Performed by: PSYCHIATRY & NEUROLOGY

## 2020-03-14 PROCEDURE — 85025 COMPLETE CBC W/AUTO DIFF WBC: CPT

## 2020-03-14 PROCEDURE — 99233 SBSQ HOSP IP/OBS HIGH 50: CPT | Mod: GC | Performed by: INTERNAL MEDICINE

## 2020-03-14 PROCEDURE — 95720 EEG PHY/QHP EA INCR W/VEEG: CPT | Performed by: PSYCHIATRY & NEUROLOGY

## 2020-03-14 PROCEDURE — 96366 THER/PROPH/DIAG IV INF ADDON: CPT

## 2020-03-14 PROCEDURE — 87799 DETECT AGENT NOS DNA QUANT: CPT

## 2020-03-14 PROCEDURE — 99152 MOD SED SAME PHYS/QHP 5/>YRS: CPT | Performed by: HOSPITALIST

## 2020-03-14 PROCEDURE — 95714 VEEG EA 12-26 HR UNMNTR: CPT | Performed by: PSYCHIATRY & NEUROLOGY

## 2020-03-14 PROCEDURE — 700111 HCHG RX REV CODE 636 W/ 250 OVERRIDE (IP): Performed by: PSYCHIATRY & NEUROLOGY

## 2020-03-14 PROCEDURE — 88342 IMHCHEM/IMCYTCHM 1ST ANTB: CPT

## 2020-03-14 PROCEDURE — A9270 NON-COVERED ITEM OR SERVICE: HCPCS | Performed by: STUDENT IN AN ORGANIZED HEALTH CARE EDUCATION/TRAINING PROGRAM

## 2020-03-14 PROCEDURE — 88311 DECALCIFY TISSUE: CPT

## 2020-03-14 PROCEDURE — 88341 IMHCHEM/IMCYTCHM EA ADD ANTB: CPT

## 2020-03-14 PROCEDURE — 96375 TX/PRO/DX INJ NEW DRUG ADDON: CPT

## 2020-03-14 PROCEDURE — 99232 SBSQ HOSP IP/OBS MODERATE 35: CPT | Performed by: NURSE PRACTITIONER

## 2020-03-14 PROCEDURE — 38222 DX BONE MARROW BX & ASPIR: CPT | Performed by: HOSPITALIST

## 2020-03-14 PROCEDURE — 079T3ZX DRAINAGE OF BONE MARROW, PERCUTANEOUS APPROACH, DIAGNOSTIC: ICD-10-PCS | Performed by: HOSPITALIST

## 2020-03-14 PROCEDURE — 38222 DX BONE MARROW BX & ASPIR: CPT | Mod: LT

## 2020-03-14 PROCEDURE — 96376 TX/PRO/DX INJ SAME DRUG ADON: CPT

## 2020-03-14 PROCEDURE — C9254 INJECTION, LACOSAMIDE: HCPCS | Performed by: PSYCHIATRY & NEUROLOGY

## 2020-03-14 PROCEDURE — 700102 HCHG RX REV CODE 250 W/ 637 OVERRIDE(OP): Performed by: STUDENT IN AN ORGANIZED HEALTH CARE EDUCATION/TRAINING PROGRAM

## 2020-03-14 PROCEDURE — 770001 HCHG ROOM/CARE - MED/SURG/GYN PRIV*

## 2020-03-14 RX ORDER — LABETALOL 200 MG/1
TABLET, FILM COATED ORAL
Status: DISCONTINUED
Start: 2020-03-14 | End: 2020-03-14

## 2020-03-14 RX ORDER — MIDAZOLAM HYDROCHLORIDE 1 MG/ML
1-4 INJECTION INTRAMUSCULAR; INTRAVENOUS ONCE
Status: COMPLETED | OUTPATIENT
Start: 2020-03-14 | End: 2020-03-14

## 2020-03-14 RX ORDER — LORAZEPAM 2 MG/ML
2 INJECTION INTRAMUSCULAR EVERY 6 HOURS PRN
Status: DISCONTINUED | OUTPATIENT
Start: 2020-03-14 | End: 2020-03-21 | Stop reason: HOSPADM

## 2020-03-14 RX ADMIN — BRIVARACETAM 100 MG: 50 INJECTION, SUSPENSION INTRAVENOUS at 12:12

## 2020-03-14 RX ADMIN — SODIUM CHLORIDE 200 MG: 9 INJECTION, SOLUTION INTRAVENOUS at 16:22

## 2020-03-14 RX ADMIN — FENTANYL CITRATE 50 MCG: 50 INJECTION INTRAMUSCULAR; INTRAVENOUS at 10:14

## 2020-03-14 RX ADMIN — BRIVARACETAM 100 MG: 50 INJECTION, SUSPENSION INTRAVENOUS at 16:21

## 2020-03-14 RX ADMIN — MIDAZOLAM HYDROCHLORIDE 2 MG: 1 INJECTION, SOLUTION INTRAMUSCULAR; INTRAVENOUS at 10:15

## 2020-03-14 RX ADMIN — SODIUM CHLORIDE 100 MG: 9 INJECTION, SOLUTION INTRAVENOUS at 05:55

## 2020-03-14 RX ADMIN — FENTANYL CITRATE 25 MCG: 50 INJECTION INTRAMUSCULAR; INTRAVENOUS at 11:00

## 2020-03-14 RX ADMIN — ACETAMINOPHEN 650 MG: 325 TABLET, FILM COATED ORAL at 21:21

## 2020-03-14 RX ADMIN — MIDAZOLAM HYDROCHLORIDE 1 MG: 1 INJECTION, SOLUTION INTRAMUSCULAR; INTRAVENOUS at 11:00

## 2020-03-14 RX ADMIN — SENNOSIDES AND DOCUSATE SODIUM 2 TABLET: 8.6; 5 TABLET ORAL at 16:21

## 2020-03-14 RX ADMIN — SODIUM CHLORIDE 200 MG: 9 INJECTION, SOLUTION INTRAVENOUS at 13:27

## 2020-03-14 ASSESSMENT — ENCOUNTER SYMPTOMS
HEMOPTYSIS: 0
WEIGHT LOSS: 0
CLAUDICATION: 0
DOUBLE VISION: 0
DEPRESSION: 0
HEADACHES: 0
COUGH: 0
BLOOD IN STOOL: 0
FEVER: 0
BACK PAIN: 1
WEAKNESS: 0
NAUSEA: 0
CHILLS: 0
SHORTNESS OF BREATH: 0
SENSORY CHANGE: 1
DIARRHEA: 0
NECK PAIN: 1
PALPITATIONS: 0
HALLUCINATIONS: 0
VOMITING: 0
SEIZURES: 1

## 2020-03-14 NOTE — PROGRESS NOTES
1020:  at the bedside to perform bone marrow biopsy. Time out completed and medications administered. See procedure charting for further details.

## 2020-03-14 NOTE — CARE PLAN
Problem: Communication  Goal: The ability to communicate needs accurately and effectively will improve  Outcome: PROGRESSING AS EXPECTED  Note: Using the ipad  for all pt interactions. Pt understands POC.      Problem: Knowledge Deficit  Goal: Knowledge of disease process/condition, treatment plan, diagnostic tests, and medications will improve  Outcome: PROGRESSING AS EXPECTED  Note: Pt to have bone bx 3/14 at the bedside

## 2020-03-14 NOTE — PROGRESS NOTES
Daily Progress Note:     Date of Service: 3/14/2020  Primary Team: [facility] IM Green Team   Attending: Fabian Quinn M.D.   Senior Resident: Dr. Alcaraz  Intern: Dr. De La Torre.  Contact:  [phone]    Chief Complaint:   Abnormal labs (WBCs of 1.9).    Patient ID:  ---------------  34 years old female with past medical history significant for seizure disorder(temporal lobe epilepsy) on Keppra and Zonisamide, anxiety/depression on Escitalopram, HIT.  Admitted to the hospital after she was found to have pancytopenia with WBCs of 1.9/ANC 0.81, patient was recently switched from lamotrigine to zonisamide after she developed side effect(rash).  Patient has had frequent convulsion despite being on medication.patient states that for the last week she has not been taking her medications as prescribed.  Patient had 2 episodes of seizure last week(03/09 and 03/11).  Patient is admitted for further evaluation of pancytopenia and antiepileptic medication adjustment.    Subjective:  - No acute events overnight  - VSS.  - patient didn't experience any seizure episodes overnight.  - labs remarkable for trending up in WBCs form 1.9 to 2.4K.   - Neurology on board, recommended  starting patient on lacosamide 200 mg IVPB q12hr and brivaracetam 100 mg IV BID, obtaining MRI brain w/o contrast , continuous EEG for additional 24 hours, and TTE with bubble study.appreciate recommendations.  - patient had her BM biopsy done this morning.  - heme/onc on board, appreciate recommendations.     Consultants/Specialty:  - Neurology.  - Heme/onc.    Review of Systems:    Review of Systems   Constitutional: Negative for chills, fever and weight loss.   Eyes: Negative for double vision.   Respiratory: Negative for cough, hemoptysis and shortness of breath.    Cardiovascular: Negative for palpitations, claudication and leg swelling.   Gastrointestinal: Negative for blood in stool, diarrhea, nausea and vomiting.   Genitourinary: Negative for dysuria,  frequency, hematuria and urgency.   Musculoskeletal: Positive for back pain and neck pain.   Skin: Negative for itching and rash.   Neurological: Positive for sensory change (pain in the left side of the body.) and seizures. Negative for weakness and headaches.   Psychiatric/Behavioral: Negative for depression, hallucinations and suicidal ideas.       Objective Data:   Physical Exam:   Vitals:   Temp:  [36.1 °C (97 °F)-37.5 °C (99.5 °F)] 37.5 °C (99.5 °F)  Pulse:  [58-89] 72  Resp:  [17-18] 17  BP: ()/(51-71) 109/58  SpO2:  [95 %-100 %] 98 %     Physical Exam  Vitals signs and nursing note reviewed.   Constitutional:       General: She is not in acute distress.     Appearance: Normal appearance. She is not ill-appearing.   HENT:      Head: Normocephalic and atraumatic.      Nose: Nose normal. No congestion or rhinorrhea.   Eyes:      Extraocular Movements: Extraocular movements intact.      Conjunctiva/sclera: Conjunctivae normal.      Pupils: Pupils are equal, round, and reactive to light.   Cardiovascular:      Rate and Rhythm: Normal rate and regular rhythm.      Pulses: Normal pulses.      Heart sounds: Normal heart sounds. No murmur. No friction rub. No gallop.    Pulmonary:      Effort: Pulmonary effort is normal. No respiratory distress.      Breath sounds: Normal breath sounds. No wheezing or rales.   Abdominal:      General: Abdomen is flat. Bowel sounds are normal. There is no distension.      Palpations: Abdomen is soft.      Tenderness: There is no abdominal tenderness.   Musculoskeletal:      Right lower leg: No edema.      Left lower leg: No edema.   Skin:     Coloration: Skin is not jaundiced.      Findings: No bruising.   Neurological:      General: No focal deficit present.      Mental Status: She is alert and oriented to person, place, and time.      Cranial Nerves: No cranial nerve deficit.   Psychiatric:      Comments: Tearful.           Labs:   Lab Results   Component Value Date/Time     WBC 2.4 (LL) 03/14/2020 04:47 AM    RBC 4.36 03/14/2020 04:47 AM    HEMOGLOBIN 10.1 (L) 03/14/2020 04:47 AM    HEMATOCRIT 32.8 (L) 03/14/2020 04:47 AM    MCV 75.2 (L) 03/14/2020 04:47 AM    MCH 23.2 (L) 03/14/2020 04:47 AM    MCHC 30.8 (L) 03/14/2020 04:47 AM    MPV 9.5 03/14/2020 04:47 AM    NEUTSPOLYS 51.50 03/14/2020 04:47 AM    LYMPHOCYTES 37.30 03/14/2020 04:47 AM    MONOCYTES 11.20 03/14/2020 04:47 AM    EOSINOPHILS 0.00 03/14/2020 04:47 AM    BASOPHILS 0.00 03/14/2020 04:47 AM    HYPOCHROMIA 1+ 02/04/2013 05:48 AM    ANISOCYTOSIS 3+ 03/12/2020 06:35 AM      Lab Results   Component Value Date/Time    SODIUM 141 03/13/2020 03:43 AM    POTASSIUM 4.2 03/13/2020 03:43 AM    CHLORIDE 108 03/13/2020 03:43 AM    CO2 25 03/13/2020 03:43 AM    GLUCOSE 97 03/13/2020 03:43 AM    BUN 19 03/13/2020 03:43 AM    CREATININE 0.77 03/13/2020 03:43 AM    CREATININE 0.8 12/04/2008 04:45 PM      Lab Results   Component Value Date/Time    PROTHROMBTM 13.2 03/13/2020 08:47 AM    INR 0.99 03/13/2020 08:47 AM     Imaging:   CT-HEAD W/O   Final Result      No evidence of acute intracranial process.      DX-CHEST-PORTABLE (1 VIEW)   Final Result      No evidence of acute cardiopulmonary process.      MR-BRAIN-W/O    (Results Pending)   EC-ECHOCARDIOGRAM COMPLETE W/O CONT    (Results Pending)         * Pancytopenia (HCC)- (present on admission)  Assessment & Plan  - Patient has noted mild anemia, leukopenia, mild thrombocytopenia  - Likely acute-subacute related to medication effect with zonisamide but can not rule out hematologic malignancy or infiltrative bone marrow process.  - HIV, hepatitis panel , B12/Folate all WNL.  - Iron studies c/w iron deficiency anaemia.  -No peripheral smear abnormalities concerning for malignancy but paucicellular per discussion with pathologist Dr. Marcelino who recommends bone marrow biopsy.  - pending BM biopsy report.  - oncology on board, appreciate their recs.  @Plan:  - Continue to hold zonisamide  -  CTM for s/s.    Seizure disorder (HCC)- (present on admission)  Assessment & Plan  - Patient on keppra and zonisamide( previously on lamotrigine ,stopped on 01/29 due to side effects).  - Frequent seizures despite being on medication.  - Neurology on board, appreciate recommendations.   - differential include true epileptic seizure vs. Psychogenic seizure.  @Plan:  - Seizure precautions  - Ativan PRN for breakthrough.  - Lacosamide 200 mg IVPB q12hr and brivaracetam 100 mg IV BID.  - stop zonisamide and keppra.  - MRI Brain w/o contrast.  - TTE with bubble.  - 24 hr EEG.    Depression, major, recurrent, moderate (HCC)- (present on admission)  Assessment & Plan  - Patient has depression, per notes has refused psychiatry/psychology   - previously on lexapro, patient stated she continues to take but not listed on medication reconciliation    Iron defeciency anemia- (present on admission)  Assessment & Plan  - Hgb 10 on admission.  - iron studies C/W iron deficiency anaemia.  - patient denies nay heavy menstrual periods,bleeding from other orifices or Phx of similar condition.  - Folate/B12 levels WNL.   @Plan:-  - PO iron supplements.  - Pending BM biopsy.  - will CTM.      Asymptomatic bacteriuria- (present on admission)  Assessment & Plan  - Patient has no urinary symptoms despite +LE and bacteria  @Plan:  - No treatment at this time

## 2020-03-14 NOTE — PROGRESS NOTES
0910 Pt alert and oriented. Ipad  utilized. Pt asked when she can eat. Educated about NPO status. Pt Verbalized understanding. Reported of 2/10 pain. Hot pack given. Assisted pt with repositioning using pillows. Pt reported no further needs. Bed locked and at the lowest position. Call button and and personal belongings placed within reach. No family member at bedside.  1053 [employer] team witnessed pt having seizure activity. Notified RN Graciela. [facility] team did not want any ativan administered. Neurology in room. Awaiting new orders. Vitals WNL per flowsheet.  1230 Pt resting. Reiterated about NPO status.  1500 EEG monitoring in place.  1805 Educated pt and family about pt's course of treatment.

## 2020-03-14 NOTE — PROCEDURES
VIDEO ELECTROENCEPHALOGRAM REPORT        Referring provider: Dr. Ho.      DOS: 3/14/2020 (total recording of 23 hours and 39 minutes).      INDICATION:  Jennifer Lopez 34 y.o. female presenting with seizures.      CURRENT ANTIEPILEPTIC REGIMEN: Briviact 100 mg q 12 hrs was added, Vimpat increased to 200 mg q 12 hrs during the study. Keppra was discontinued.      TECHNIQUE: 30 channel video electroencephalogram (EEG) was performed in accordance with the international 10-20 system. The study was reviewed in bipolar and referential montages. The recording examined the patient during wakeful and drowsy/sleep state(s).      DESCRIPTION OF THE RECORD:  During the wakefulness, the background showed a symmetrical 10 Hz alpha activity posteriorly with amplitude of 70 mV.  There was reactivity to eye closure/opening.  A normal anterior-posterior gradient was noted with faster beta frequencies seen anteriorly.  During drowsiness, theta/delta frequencies were seen.     During the sleep state, background shows diffuse high-amplitude 4-5 Hz delta activity.  Symmetrical high-amplitude sleep spindles and vertex sharps were seen in the leads over the central regions.      ACTIVATION PROCEDURES:   Not performed.      ICTAL AND/OR INTERICTAL FINDINGS:   Frequent, independent bitemporal sharps and intermittent bitemporal slowing noted. Frequent, mostly brief runs of rhythmic or Lateralized Periodic Discharges (LPDs) on either right or left temporal regions, sometimes independently but may be synchronous. Frequent, brief non convulsive seizures with onset on either right or left temporal chains, with seizures spreading bitemporally fast, but typically becoming more prominent on the right temporal chain. There were no clear clinical changes associated with the seizures, however the patient was not assessed during these otherwise not clinically detected spells.      EKG: sampling of the EKG recording demonstrated sinus  rhythm.      EVENTS:  None marked for review.      INTERPRETATION:  This is an abnormal video EEG recording in the awake, drowsy, and sleep state(s).  Frequent, independent bitemporal sharps and intermittent bitemporal slowing noted. Frequent, mostly brief runs of rhythmic or Lateralized Periodic Discharges (LPDs) on either right or left temporal regions, sometimes independently but may be synchronous. Frequent, brief non convulsive seizures with onset on either right or left temporal chains, with seizures spreading fast bitemporally, but typically becoming more prominent on the right temporal chain. There were no clear clinical changes associated with the seizures, however the patient's mentation was not assessed during these otherwise not clinically detected spells. The findings suggest multifocal, refractory epilepsy with bitemporal epileptogenicity. Clinical and radiological correlation is recommended.     Updates provided to Dr. Ho.            Dimitry Munoz MD   Epilepsy and Neurodiagnostics.   Clinical  of Neurology [facility].   Diplomate in Neurology, Epilepsy, and Electrodiagnostic Medicine.   Office: [phone]  Fax: [phone]

## 2020-03-14 NOTE — PROCEDURES
DATE OF PROCEDURE: 3/14/2020    PROCEDURE: Bone marrow biopsy with bone marrow aspiration.     REQUESTING PHYSICIAN: Dr. Jarrett    INDICATIONS: Pancytopenia    INFORMED CONSENT: Consent signed and on the chart. [place] interpretor via video conference was used for consent.    DESCRIPTION: A time out was called. The patient was placed in the prone position. The left buttock was prepped and draped in a sterile fashion.  1 mL of 1% lidocaine was injected into the skin followed by 3 mL into the periosteum of the posterior ilium bone. Large-bore needle was used to obtain 5 mL of aspirate followed by 5 mL   into a heparinized syringe for flow cytometry. This followed by a  bone marrow biopsy using the Jamshidi needle.     SEDATION USED: 3mg IV versed and 75mcg IV fentanyl  Start time for moderate sedation:10:20  End time for moderate sedation: 10:28    ESTIMATED BLOOD LOSS: None
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
## 2020-03-14 NOTE — PROGRESS NOTES
0743 Pt alert and oriented. Reported of 2/10 pain. Encouraged relaxation and distraction. Assisted pt with repositioning using pillows. Pt reported no further needs. Bed locked and at the lowest position. Call button and and personal belongings placed within reach. No family member at bedside. Waiting for procedure.  1020 Physician at bedside to perform bone biopsy. Time out complete.  1028 Procedure complete. Pt tolerated well.  1300 Pt eating lunch.  1548 Assisted pt with ambulating to the restroom with stand by assist. Tolerated well. Denies any pain.

## 2020-03-15 ENCOUNTER — APPOINTMENT (OUTPATIENT)
Dept: CARDIOLOGY | Facility: MEDICAL CENTER | Age: 35
DRG: 809 | End: 2020-03-15
Attending: STUDENT IN AN ORGANIZED HEALTH CARE EDUCATION/TRAINING PROGRAM
Payer: MEDICAID

## 2020-03-15 LAB
ANION GAP SERPL CALC-SCNC: 4 MMOL/L (ref 7–16)
BASOPHILS # BLD AUTO: 0 % (ref 0–1.8)
BASOPHILS # BLD: 0 K/UL (ref 0–0.12)
BUN SERPL-MCNC: 19 MG/DL (ref 8–22)
CALCIUM SERPL-MCNC: 8.5 MG/DL (ref 8.5–10.5)
CHLORIDE SERPL-SCNC: 110 MMOL/L (ref 96–112)
CO2 SERPL-SCNC: 24 MMOL/L (ref 20–33)
CREAT SERPL-MCNC: 0.67 MG/DL (ref 0.5–1.4)
EOSINOPHIL # BLD AUTO: 0 K/UL (ref 0–0.51)
EOSINOPHIL NFR BLD: 0 % (ref 0–6.9)
ERYTHROCYTE [DISTWIDTH] IN BLOOD BY AUTOMATED COUNT: 44.4 FL (ref 35.9–50)
GLUCOSE SERPL-MCNC: 95 MG/DL (ref 65–99)
HCT VFR BLD AUTO: 29.5 % (ref 37–47)
HGB BLD-MCNC: 9.2 G/DL (ref 12–16)
IMM GRANULOCYTES # BLD AUTO: 0 K/UL (ref 0–0.11)
IMM GRANULOCYTES NFR BLD AUTO: 0 % (ref 0–0.9)
LV EJECT FRACT  99904: 70
LV EJECT FRACT MOD 2C 99903: 78.51
LV EJECT FRACT MOD 4C 99902: 80.69
LV EJECT FRACT MOD BP 99901: 79.3
LYMPHOCYTES # BLD AUTO: 0.91 K/UL (ref 1–4.8)
LYMPHOCYTES NFR BLD: 36.3 % (ref 22–41)
MCH RBC QN AUTO: 23.1 PG (ref 27–33)
MCHC RBC AUTO-ENTMCNC: 31.2 G/DL (ref 33.6–35)
MCV RBC AUTO: 74.1 FL (ref 81.4–97.8)
MONOCYTES # BLD AUTO: 0.21 K/UL (ref 0–0.85)
MONOCYTES NFR BLD AUTO: 8.4 % (ref 0–13.4)
NEUTROPHILS # BLD AUTO: 1.39 K/UL (ref 2–7.15)
NEUTROPHILS NFR BLD: 55.3 % (ref 44–72)
NRBC # BLD AUTO: 0 K/UL
NRBC BLD-RTO: 0 /100 WBC
PLATELET # BLD AUTO: 114 K/UL (ref 164–446)
PMV BLD AUTO: 9.3 FL (ref 9–12.9)
POTASSIUM SERPL-SCNC: 3.8 MMOL/L (ref 3.6–5.5)
RBC # BLD AUTO: 3.98 M/UL (ref 4.2–5.4)
SODIUM SERPL-SCNC: 138 MMOL/L (ref 135–145)
WBC # BLD AUTO: 2.5 K/UL (ref 4.8–10.8)

## 2020-03-15 PROCEDURE — 99232 SBSQ HOSP IP/OBS MODERATE 35: CPT | Mod: 25 | Performed by: NURSE PRACTITIONER

## 2020-03-15 PROCEDURE — 93306 TTE W/DOPPLER COMPLETE: CPT | Mod: 26 | Performed by: INTERNAL MEDICINE

## 2020-03-15 PROCEDURE — 95720 EEG PHY/QHP EA INCR W/VEEG: CPT | Performed by: PSYCHIATRY & NEUROLOGY

## 2020-03-15 PROCEDURE — 700102 HCHG RX REV CODE 250 W/ 637 OVERRIDE(OP): Performed by: PSYCHIATRY & NEUROLOGY

## 2020-03-15 PROCEDURE — 700102 HCHG RX REV CODE 250 W/ 637 OVERRIDE(OP): Performed by: STUDENT IN AN ORGANIZED HEALTH CARE EDUCATION/TRAINING PROGRAM

## 2020-03-15 PROCEDURE — 700111 HCHG RX REV CODE 636 W/ 250 OVERRIDE (IP): Performed by: PSYCHIATRY & NEUROLOGY

## 2020-03-15 PROCEDURE — 99232 SBSQ HOSP IP/OBS MODERATE 35: CPT | Mod: GC | Performed by: INTERNAL MEDICINE

## 2020-03-15 PROCEDURE — 36415 COLL VENOUS BLD VENIPUNCTURE: CPT

## 2020-03-15 PROCEDURE — 80048 BASIC METABOLIC PNL TOTAL CA: CPT

## 2020-03-15 PROCEDURE — 95714 VEEG EA 12-26 HR UNMNTR: CPT | Performed by: PSYCHIATRY & NEUROLOGY

## 2020-03-15 PROCEDURE — 93306 TTE W/DOPPLER COMPLETE: CPT

## 2020-03-15 PROCEDURE — 770001 HCHG ROOM/CARE - MED/SURG/GYN PRIV*

## 2020-03-15 PROCEDURE — 700105 HCHG RX REV CODE 258: Performed by: PSYCHIATRY & NEUROLOGY

## 2020-03-15 PROCEDURE — C9254 INJECTION, LACOSAMIDE: HCPCS | Performed by: PSYCHIATRY & NEUROLOGY

## 2020-03-15 PROCEDURE — 85025 COMPLETE CBC W/AUTO DIFF WBC: CPT

## 2020-03-15 PROCEDURE — A9270 NON-COVERED ITEM OR SERVICE: HCPCS | Performed by: PSYCHIATRY & NEUROLOGY

## 2020-03-15 PROCEDURE — A9270 NON-COVERED ITEM OR SERVICE: HCPCS | Performed by: STUDENT IN AN ORGANIZED HEALTH CARE EDUCATION/TRAINING PROGRAM

## 2020-03-15 PROCEDURE — 700101 HCHG RX REV CODE 250: Performed by: PSYCHIATRY & NEUROLOGY

## 2020-03-15 RX ADMIN — FERROUS SULFATE TAB 325 MG (65 MG ELEMENTAL FE) 325 MG: 325 (65 FE) TAB at 10:14

## 2020-03-15 RX ADMIN — BRIVARACETAM 100 MG: 50 INJECTION, SUSPENSION INTRAVENOUS at 05:33

## 2020-03-15 RX ADMIN — PERAMPANEL 2 MG: 2 TABLET ORAL at 22:00

## 2020-03-15 RX ADMIN — SODIUM CHLORIDE 200 MG: 9 INJECTION, SOLUTION INTRAVENOUS at 05:33

## 2020-03-15 RX ADMIN — SODIUM CHLORIDE 200 MG: 9 INJECTION, SOLUTION INTRAVENOUS at 17:39

## 2020-03-15 RX ADMIN — ACETAMINOPHEN 650 MG: 325 TABLET, FILM COATED ORAL at 17:44

## 2020-03-15 RX ADMIN — BRIVARACETAM 100 MG: 50 INJECTION, SUSPENSION INTRAVENOUS at 17:38

## 2020-03-15 ASSESSMENT — ENCOUNTER SYMPTOMS
CLAUDICATION: 0
DEPRESSION: 0
NAUSEA: 0
VOMITING: 0
NECK PAIN: 1
CHILLS: 0
BLOOD IN STOOL: 0
SHORTNESS OF BREATH: 0
PALPITATIONS: 0
SEIZURES: 1
WEIGHT LOSS: 0
DOUBLE VISION: 0
HALLUCINATIONS: 0
SENSORY CHANGE: 1
WEAKNESS: 0
FEVER: 0
HEMOPTYSIS: 0
HEADACHES: 0
DIARRHEA: 0
BACK PAIN: 1
COUGH: 0

## 2020-03-15 ASSESSMENT — FIBROSIS 4 INDEX: FIB4 SCORE: 1.35

## 2020-03-15 NOTE — PROGRESS NOTES
Pt. AOx4. Pt. Having some pain on R hip from bone biopsy, gave tylenol per MAR. VSS, assessment completed. No issues or concerns. Will continue to monitor.

## 2020-03-15 NOTE — PROGRESS NOTES
Daily Progress Note:     Date of Service: 3/15/2020  Primary Team: [facility] IM Green Team   Attending: Fabian Quinn M.D.   Senior Resident: Dr. Alcaraz  Intern: Dr. De La Torre.  Contact:  [phone]    Chief Complaint:   Abnormal labs (WBCs of 1.9).    Patient ID:  ---------------  34 years old female with past medical history significant for seizure disorder(temporal lobe epilepsy) on Keppra and Zonisamide, anxiety/depression on Escitalopram, HIT.  Admitted to the hospital after she was found to have pancytopenia with WBCs of 1.9/ANC 0.81, patient was recently switched from lamotrigine to zonisamide after she developed side effect(rash).  Patient has had frequent convulsion despite being on medication.patient states that for the last week she has not been taking her medications as prescribed.  Patient had 2 episodes of seizure last week(03/09 and 03/11).  Patient is admitted for further evaluation of pancytopenia and antiepileptic medication adjustment.    Subjective:  - No acute events overnight  - VSS.  - patient didn't experience any seizure episodes overnight.  - labs remarkable for trending up 2.5.K.   - Continue on lacosamide and brivaracetam .  - MRI brain w/o contrast once EEG leads are off.  - Pending BM biopsy result.  - Neurology on board, appreciate recommendations.     Consultants/Specialty:  - Neurology.  - Heme/onc.    Review of Systems:    Review of Systems   Constitutional: Negative for chills, fever and weight loss.   Eyes: Negative for double vision.   Respiratory: Negative for cough, hemoptysis and shortness of breath.    Cardiovascular: Negative for palpitations, claudication and leg swelling.   Gastrointestinal: Negative for blood in stool, diarrhea, nausea and vomiting.   Genitourinary: Negative for dysuria, frequency, hematuria and urgency.   Musculoskeletal: Positive for back pain and neck pain.   Skin: Negative for itching and rash.   Neurological: Positive for sensory change (pain in the left  side of the body.) and seizures. Negative for weakness and headaches.   Psychiatric/Behavioral: Negative for depression, hallucinations and suicidal ideas.       Objective Data:   Physical Exam:   Vitals:   Temp:  [36.6 °C (97.8 °F)-37.5 °C (99.5 °F)] 37.1 °C (98.7 °F)  Pulse:  [71-87] 78  Resp:  [16-17] 17  BP: (104-111)/(58-64) 111/61  SpO2:  [97 %-100 %] 100 %     Physical Exam  Vitals signs and nursing note reviewed.   Constitutional:       General: She is not in acute distress.     Appearance: Normal appearance. She is not ill-appearing.   HENT:      Head: Normocephalic and atraumatic.      Nose: Nose normal. No congestion or rhinorrhea.   Eyes:      Extraocular Movements: Extraocular movements intact.      Conjunctiva/sclera: Conjunctivae normal.      Pupils: Pupils are equal, round, and reactive to light.   Cardiovascular:      Rate and Rhythm: Normal rate and regular rhythm.      Pulses: Normal pulses.      Heart sounds: Normal heart sounds. No murmur. No friction rub. No gallop.    Pulmonary:      Effort: Pulmonary effort is normal. No respiratory distress.      Breath sounds: Normal breath sounds. No wheezing or rales.   Abdominal:      General: Abdomen is flat. Bowel sounds are normal. There is no distension.      Palpations: Abdomen is soft.      Tenderness: There is no abdominal tenderness.   Musculoskeletal:      Right lower leg: No edema.      Left lower leg: No edema.   Skin:     Coloration: Skin is not jaundiced.      Findings: No bruising.   Neurological:      General: No focal deficit present.      Mental Status: She is alert and oriented to person, place, and time.      Cranial Nerves: No cranial nerve deficit.   Psychiatric:      Comments: Tearful.           Labs:   Lab Results   Component Value Date/Time    WBC 2.5 (LL) 03/15/2020 03:45 AM    RBC 3.98 (L) 03/15/2020 03:45 AM    HEMOGLOBIN 9.2 (L) 03/15/2020 03:45 AM    HEMATOCRIT 29.5 (L) 03/15/2020 03:45 AM    MCV 74.1 (L) 03/15/2020 03:45 AM     MCH 23.1 (L) 03/15/2020 03:45 AM    MCHC 31.2 (L) 03/15/2020 03:45 AM    MPV 9.3 03/15/2020 03:45 AM    NEUTSPOLYS 55.30 03/15/2020 03:45 AM    LYMPHOCYTES 36.30 03/15/2020 03:45 AM    MONOCYTES 8.40 03/15/2020 03:45 AM    EOSINOPHILS 0.00 03/15/2020 03:45 AM    BASOPHILS 0.00 03/15/2020 03:45 AM    HYPOCHROMIA 1+ 02/04/2013 05:48 AM    ANISOCYTOSIS 3+ 03/12/2020 06:35 AM      Lab Results   Component Value Date/Time    SODIUM 138 03/15/2020 03:45 AM    POTASSIUM 3.8 03/15/2020 03:45 AM    CHLORIDE 110 03/15/2020 03:45 AM    CO2 24 03/15/2020 03:45 AM    GLUCOSE 95 03/15/2020 03:45 AM    BUN 19 03/15/2020 03:45 AM    CREATININE 0.67 03/15/2020 03:45 AM    CREATININE 0.8 12/04/2008 04:45 PM      Lab Results   Component Value Date/Time    PROTHROMBTM 13.2 03/13/2020 08:47 AM    INR 0.99 03/13/2020 08:47 AM     Imaging:   EC-ECHOCARDIOGRAM COMPLETE W/O CONT         CT-HEAD W/O   Final Result      No evidence of acute intracranial process.      DX-CHEST-PORTABLE (1 VIEW)   Final Result      No evidence of acute cardiopulmonary process.      MR-BRAIN-W/O    (Results Pending)         * Pancytopenia (HCC)- (present on admission)  Assessment & Plan  - Patient has noted mild anemia, leukopenia, mild thrombocytopenia  - Likely acute-subacute related to medication effect with zonisamide but can not rule out hematologic malignancy or infiltrative bone marrow process.  - HIV, hepatitis panel , B12/Folate all WNL.  - Iron studies c/w iron deficiency anaemia.  -No peripheral smear abnormalities concerning for malignancy but paucicellular per discussion with pathologist Dr. Marcelino who recommends bone marrow biopsy.  - pending BM biopsy report.  - oncology on board, appreciate their recs.  @Plan:  - Continue to hold zonisamide  - CTM for s/s.    Seizure disorder (HCC)- (present on admission)  Assessment & Plan  - Patient on keppra and zonisamide( previously on lamotrigine ,stopped on 01/29 due to side effects).  - Frequent  seizures despite being on medication.  - Neurology on board, appreciate recommendations.   - differential include true epileptic seizure vs. Psychogenic seizure.  @Plan:  - Seizure precautions  - Ativan PRN for breakthrough.  - Lacosamide 200 mg IVPB q12hr and brivaracetam 100 mg IV BID.  - stop zonisamide and keppra.  - MRI Brain w/o contrast.  - TTE with bubble.  - 24 hr EEG.    Depression, major, recurrent, moderate (HCC)- (present on admission)  Assessment & Plan  - Patient has depression, per notes has refused psychiatry/psychology   - previously on lexapro, patient stated she continues to take but not listed on medication reconciliation    Iron defeciency anemia- (present on admission)  Assessment & Plan  - Hgb 10 on admission.  - iron studies C/W iron deficiency anaemia.  - patient denies nay heavy menstrual periods,bleeding from other orifices or Phx of similar condition.  - Folate/B12 levels WNL.   @Plan:-  - PO iron supplements.  - Pending BM biopsy ersult.  - will CTM.      Asymptomatic bacteriuria- (present on admission)  Assessment & Plan  - Patient has no urinary symptoms despite +LE and bacteria  @Plan:  - No treatment at this time
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
## 2020-03-15 NOTE — CARE PLAN
Problem: Safety  Goal: Will remain free from injury  Outcome: PROGRESSING AS EXPECTED  Note: Educated pt to use call button to call for help before getting up. Bed rails up x2. Provided hospital non-slip socks. Bed locked and at the lowest position. Call light and personal belongings within reach. Ambulates with stand by assist.     Problem: Venous Thromboembolism (VTW)/Deep Vein Thrombosis (DVT) Prevention:  Goal: Patient will participate in Venous Thrombosis (VTE)/Deep Vein Thrombosis (DVT)Prevention Measures  Outcome: PROGRESSING AS EXPECTED  Note: Pt ambulating in her room.     Problem: Knowledge Deficit  Goal: Knowledge of disease process/condition, treatment plan, diagnostic tests, and medications will improve  Outcome: PROGRESSING AS EXPECTED  Note: Educated pt about treatment plan using iPad . Pt verbalized understanding.

## 2020-03-15 NOTE — PROGRESS NOTES
Anusha from Lab called with critical result of WBC 2.5 at 0440. Critical lab result read back to Anusha.   On call MD villasenor/ ILAN Menendez notified of critical lab result at WBC 2.5.  Critical lab result read back by On call MD villasenor/ ILAN Menendez.

## 2020-03-15 NOTE — CARE PLAN
Problem: Communication  Goal: The ability to communicate needs accurately and effectively will improve  Outcome: PROGRESSING AS EXPECTED     Problem: Safety  Goal: Will remain free from injury  Outcome: PROGRESSING AS EXPECTED  Goal: Will remain free from falls  Outcome: PROGRESSING AS EXPECTED  Pt. Will remain free from harm throughout shift.     Problem: Infection  Goal: Will remain free from infection  Outcome: PROGRESSING AS EXPECTED     Problem: Venous Thromboembolism (VTW)/Deep Vein Thrombosis (DVT) Prevention:  Goal: Patient will participate in Venous Thrombosis (VTE)/Deep Vein Thrombosis (DVT)Prevention Measures  Outcome: PROGRESSING AS EXPECTED     Problem: Bowel/Gastric:  Goal: Normal bowel function is maintained or improved  Outcome: PROGRESSING AS EXPECTED  Goal: Will not experience complications related to bowel motility  Outcome: PROGRESSING AS EXPECTED     Problem: Knowledge Deficit  Goal: Knowledge of disease process/condition, treatment plan, diagnostic tests, and medications will improve  Outcome: PROGRESSING AS EXPECTED  Goal: Knowledge of the prescribed therapeutic regimen will improve  Outcome: PROGRESSING AS EXPECTED     Problem: Discharge Barriers/Planning  Goal: Patient's continuum of care needs will be met  Outcome: PROGRESSING AS EXPECTED     Problem: Pain Management  Goal: Pain level will decrease to patient's comfort goal  Outcome: PROGRESSING AS EXPECTED   Keep pain less than 4 on pain scale per pt MAR

## 2020-03-16 ENCOUNTER — APPOINTMENT (OUTPATIENT)
Dept: RADIOLOGY | Facility: MEDICAL CENTER | Age: 35
DRG: 809 | End: 2020-03-16
Attending: STUDENT IN AN ORGANIZED HEALTH CARE EDUCATION/TRAINING PROGRAM
Payer: MEDICAID

## 2020-03-16 LAB
BASOPHILS # BLD AUTO: 0 % (ref 0–1.8)
BASOPHILS # BLD: 0 K/UL (ref 0–0.12)
EOSINOPHIL # BLD AUTO: 0 K/UL (ref 0–0.51)
EOSINOPHIL NFR BLD: 0 % (ref 0–6.9)
ERYTHROCYTE [DISTWIDTH] IN BLOOD BY AUTOMATED COUNT: 44 FL (ref 35.9–50)
HCT VFR BLD AUTO: 33.1 % (ref 37–47)
HGB BLD-MCNC: 10.1 G/DL (ref 12–16)
IMM GRANULOCYTES # BLD AUTO: 0.01 K/UL (ref 0–0.11)
IMM GRANULOCYTES NFR BLD AUTO: 0.4 % (ref 0–0.9)
LYMPHOCYTES # BLD AUTO: 0.99 K/UL (ref 1–4.8)
LYMPHOCYTES NFR BLD: 38.1 % (ref 22–41)
MCH RBC QN AUTO: 22.6 PG (ref 27–33)
MCHC RBC AUTO-ENTMCNC: 30.5 G/DL (ref 33.6–35)
MCV RBC AUTO: 74.2 FL (ref 81.4–97.8)
MONOCYTES # BLD AUTO: 0.22 K/UL (ref 0–0.85)
MONOCYTES NFR BLD AUTO: 8.5 % (ref 0–13.4)
NEUTROPHILS # BLD AUTO: 1.38 K/UL (ref 2–7.15)
NEUTROPHILS NFR BLD: 53 % (ref 44–72)
NRBC # BLD AUTO: 0 K/UL
NRBC BLD-RTO: 0 /100 WBC
PLATELET # BLD AUTO: 137 K/UL (ref 164–446)
PMV BLD AUTO: 9.7 FL (ref 9–12.9)
RBC # BLD AUTO: 4.46 M/UL (ref 4.2–5.4)
WBC # BLD AUTO: 2.6 K/UL (ref 4.8–10.8)

## 2020-03-16 PROCEDURE — 700102 HCHG RX REV CODE 250 W/ 637 OVERRIDE(OP): Performed by: STUDENT IN AN ORGANIZED HEALTH CARE EDUCATION/TRAINING PROGRAM

## 2020-03-16 PROCEDURE — A9270 NON-COVERED ITEM OR SERVICE: HCPCS | Performed by: STUDENT IN AN ORGANIZED HEALTH CARE EDUCATION/TRAINING PROGRAM

## 2020-03-16 PROCEDURE — 700102 HCHG RX REV CODE 250 W/ 637 OVERRIDE(OP): Performed by: PSYCHIATRY & NEUROLOGY

## 2020-03-16 PROCEDURE — C9254 INJECTION, LACOSAMIDE: HCPCS | Performed by: PSYCHIATRY & NEUROLOGY

## 2020-03-16 PROCEDURE — A9270 NON-COVERED ITEM OR SERVICE: HCPCS | Performed by: PSYCHIATRY & NEUROLOGY

## 2020-03-16 PROCEDURE — 85025 COMPLETE CBC W/AUTO DIFF WBC: CPT

## 2020-03-16 PROCEDURE — 700105 HCHG RX REV CODE 258: Performed by: PSYCHIATRY & NEUROLOGY

## 2020-03-16 PROCEDURE — 700111 HCHG RX REV CODE 636 W/ 250 OVERRIDE (IP): Performed by: PSYCHIATRY & NEUROLOGY

## 2020-03-16 PROCEDURE — 36415 COLL VENOUS BLD VENIPUNCTURE: CPT

## 2020-03-16 PROCEDURE — 70551 MRI BRAIN STEM W/O DYE: CPT

## 2020-03-16 PROCEDURE — 99232 SBSQ HOSP IP/OBS MODERATE 35: CPT | Mod: GC | Performed by: INTERNAL MEDICINE

## 2020-03-16 PROCEDURE — 770001 HCHG ROOM/CARE - MED/SURG/GYN PRIV*

## 2020-03-16 PROCEDURE — 700101 HCHG RX REV CODE 250: Performed by: PSYCHIATRY & NEUROLOGY

## 2020-03-16 RX ADMIN — ACETAMINOPHEN 650 MG: 325 TABLET, FILM COATED ORAL at 10:29

## 2020-03-16 RX ADMIN — SODIUM CHLORIDE 200 MG: 9 INJECTION, SOLUTION INTRAVENOUS at 06:41

## 2020-03-16 RX ADMIN — SENNOSIDES AND DOCUSATE SODIUM 2 TABLET: 8.6; 5 TABLET ORAL at 18:13

## 2020-03-16 RX ADMIN — BRIVARACETAM 100 MG: 50 INJECTION, SUSPENSION INTRAVENOUS at 18:13

## 2020-03-16 RX ADMIN — BRIVARACETAM 100 MG: 50 INJECTION, SUSPENSION INTRAVENOUS at 10:28

## 2020-03-16 RX ADMIN — SODIUM CHLORIDE 200 MG: 9 INJECTION, SOLUTION INTRAVENOUS at 18:13

## 2020-03-16 RX ADMIN — FERROUS SULFATE TAB 325 MG (65 MG ELEMENTAL FE) 325 MG: 325 (65 FE) TAB at 10:29

## 2020-03-16 RX ADMIN — PERAMPANEL 2 MG: 2 TABLET ORAL at 21:02

## 2020-03-16 ASSESSMENT — ENCOUNTER SYMPTOMS
NAUSEA: 0
HEMOPTYSIS: 0
VOMITING: 0
CHILLS: 0
FEVER: 0
HEADACHES: 0
CLAUDICATION: 0
DOUBLE VISION: 0
BACK PAIN: 1
SENSORY CHANGE: 0
COUGH: 0
SHORTNESS OF BREATH: 0
BLOOD IN STOOL: 0
WEAKNESS: 0
NECK PAIN: 0
WEIGHT LOSS: 0
DIARRHEA: 0
HALLUCINATIONS: 0
SEIZURES: 0
PALPITATIONS: 0
DEPRESSION: 0

## 2020-03-16 NOTE — PROGRESS NOTES
service used. Assessment completed.Alert and oriented  to person,place and time.On room air sat 99%.Complaied of foot pain 5/10 at this time.PRN and due medication given per MAR.Patients belongings within reach,Bed locked in low position.States understanding of POC. Call light at reach, advised to call for assistance.

## 2020-03-16 NOTE — PROGRESS NOTES
Daily Progress Note:     Date of Service: 3/16/2020  Primary Team: [facility] IM Green Team   Attending: Fabian Quinn M.D.   Senior Resident: Dr. Alcaraz  Intern: Dr. De La Torre.  Contact:  [phone]    Chief Complaint:   Abnormal labs (WBCs of 1.9).    Patient ID:  ---------------  34 years old female with past medical history significant for seizure disorder(temporal lobe epilepsy) on Keppra and Zonisamide, anxiety/depression on Escitalopram, HIT.  Admitted to the hospital after she was found to have pancytopenia with WBCs of 1.9/ANC 0.81, patient was recently switched from lamotrigine to zonisamide after she developed side effect(rash).  Patient has had frequent convulsion despite being on medication.patient states that for the last week she has not been taking her medications as prescribed.  Patient had 2 episodes of seizure last week(03/09 and 03/11).  Patient is admitted for further evaluation of pancytopenia and antiepileptic medication adjustment.    Subjective:  - No acute events overnight  - VSS.  - patient didn't experience any seizure episodes overnight.  - labs remarkable for WBCs trending up to 2.5 K.   - Continue on lacosamide and brivaracetam.  - pending MRI brain w/o contrast.  - TTE with bubble study showed no evidence of shunt.  - Pending BM biopsy result.  - Neurology on board. Awaiting recommendations regarding antieplitic meds adjustment.     Consultants/Specialty:  - Neurology.  - Heme/onc.    Review of Systems:    Review of Systems   Constitutional: Negative for chills, fever and weight loss.   Eyes: Negative for double vision.   Respiratory: Negative for cough, hemoptysis and shortness of breath.    Cardiovascular: Negative for palpitations, claudication and leg swelling.   Gastrointestinal: Negative for blood in stool, diarrhea, nausea and vomiting.   Genitourinary: Negative for dysuria, frequency, hematuria and urgency.   Musculoskeletal: Positive for back pain. Negative for neck pain.   Skin:  Negative for itching and rash.   Neurological: Negative for sensory change (pain in the left side of the body.), seizures, weakness and headaches.   Psychiatric/Behavioral: Negative for depression, hallucinations and suicidal ideas.       Objective Data:   Physical Exam:   Vitals:   Temp:  [36.8 °C (98.3 °F)-37 °C (98.6 °F)] 36.8 °C (98.3 °F)  Pulse:  [63-65] 63  Resp:  [16-17] 16  BP: (106-120)/(63-68) 106/63  SpO2:  [97 %-100 %] 100 %     Physical Exam  Vitals signs and nursing note reviewed.   Constitutional:       General: She is not in acute distress.     Appearance: Normal appearance. She is not ill-appearing.   HENT:      Head: Normocephalic and atraumatic.      Nose: Nose normal. No congestion or rhinorrhea.   Eyes:      Extraocular Movements: Extraocular movements intact.      Conjunctiva/sclera: Conjunctivae normal.      Pupils: Pupils are equal, round, and reactive to light.   Cardiovascular:      Rate and Rhythm: Normal rate and regular rhythm.      Pulses: Normal pulses.      Heart sounds: Normal heart sounds. No murmur. No friction rub. No gallop.    Pulmonary:      Effort: Pulmonary effort is normal. No respiratory distress.      Breath sounds: Normal breath sounds. No wheezing or rales.   Abdominal:      General: Abdomen is flat. Bowel sounds are normal. There is no distension.      Palpations: Abdomen is soft.      Tenderness: There is no abdominal tenderness.   Musculoskeletal:      Right lower leg: No edema.      Left lower leg: No edema.   Skin:     Coloration: Skin is not jaundiced.      Findings: No bruising.   Neurological:      General: No focal deficit present.      Mental Status: She is alert and oriented to person, place, and time.      Cranial Nerves: No cranial nerve deficit.   Psychiatric:      Comments: Tearful.           Labs:   Lab Results   Component Value Date/Time    WBC 2.6 (L) 03/16/2020 04:47 AM    RBC 4.46 03/16/2020 04:47 AM    HEMOGLOBIN 10.1 (L) 03/16/2020 04:47 AM     HEMATOCRIT 33.1 (L) 03/16/2020 04:47 AM    MCV 74.2 (L) 03/16/2020 04:47 AM    MCH 22.6 (L) 03/16/2020 04:47 AM    MCHC 30.5 (L) 03/16/2020 04:47 AM    MPV 9.7 03/16/2020 04:47 AM    NEUTSPOLYS 53.00 03/16/2020 04:47 AM    LYMPHOCYTES 38.10 03/16/2020 04:47 AM    MONOCYTES 8.50 03/16/2020 04:47 AM    EOSINOPHILS 0.00 03/16/2020 04:47 AM    BASOPHILS 0.00 03/16/2020 04:47 AM    HYPOCHROMIA 1+ 02/04/2013 05:48 AM    ANISOCYTOSIS 3+ 03/12/2020 06:35 AM      Lab Results   Component Value Date/Time    SODIUM 138 03/15/2020 03:45 AM    POTASSIUM 3.8 03/15/2020 03:45 AM    CHLORIDE 110 03/15/2020 03:45 AM    CO2 24 03/15/2020 03:45 AM    GLUCOSE 95 03/15/2020 03:45 AM    BUN 19 03/15/2020 03:45 AM    CREATININE 0.67 03/15/2020 03:45 AM    CREATININE 0.8 12/04/2008 04:45 PM      Lab Results   Component Value Date/Time    PROTHROMBTM 13.2 03/13/2020 08:47 AM    INR 0.99 03/13/2020 08:47 AM     Imaging:   EC-ECHOCARDIOGRAM COMPLETE W/O CONT   Final Result      CT-HEAD W/O   Final Result      No evidence of acute intracranial process.      DX-CHEST-PORTABLE (1 VIEW)   Final Result      No evidence of acute cardiopulmonary process.      MR-BRAIN-W/O    (Results Pending)         * Pancytopenia (HCC)- (present on admission)  Assessment & Plan  - Patient has noted mild anemia, leukopenia, mild thrombocytopenia  - Likely acute-subacute related to medication effect with zonisamide but can not rule out hematologic malignancy or infiltrative bone marrow process.  - HIV, hepatitis panel , B12/Folate all WNL.  - Iron studies c/w iron deficiency anaemia.  -No peripheral smear abnormalities concerning for malignancy but paucicellular per discussion with pathologist Dr. Marcelino who recommends bone marrow biopsy.  - pending BM biopsy report.  - oncology on board, appreciate their recs.  @Plan:  - Continue to hold zonisamide  - CTM for s/s.    Seizure disorder (HCC)- (present on admission)  Assessment & Plan  - Patient on keppra and  zonisamide( previously on lamotrigine ,stopped on 01/29 due to side effects).  - Frequent seizures despite being on medication.  - Neurology on board, appreciate recommendations.   - differential include true epileptic seizure vs. Psychogenic seizure.  - TTE with bubble with no evidence of shunt.  @Plan:  - Seizure precautions  - Ativan PRN for breakthrough.  - Lacosamide 200 mg IVPB q12hr and brivaracetam 100 mg IV BID.  - stop zonisamide and keppra.  - MRI Brain w/o contrast.    Depression, major, recurrent, moderate (HCC)- (present on admission)  Assessment & Plan  - Patient has depression, per notes has refused psychiatry/psychology   - previously on lexapro, patient stated she continues to take but not listed on medication reconciliation    Iron defeciency anemia- (present on admission)  Assessment & Plan  - Hgb 10 on admission.  - iron studies C/W iron deficiency anaemia.  - patient denies nay heavy menstrual periods,bleeding from other orifices or Phx of similar condition.  - Folate/B12 levels WNL.   @Plan:-  - PO iron supplements.  - Pending BM biopsy result.  - will CTM.      Asymptomatic bacteriuria- (present on admission)  Assessment & Plan  - Patient has no urinary symptoms despite +LE and bacteria  @Plan:  - No treatment at this time

## 2020-03-16 NOTE — CARE PLAN
Problem: Communication  Goal: The ability to communicate needs accurately and effectively will improve  Outcome: PROGRESSING AS EXPECTED     services in use. Patient able to communicate needs to staff. Call light within reach, patient educated to call for assistance. Patient calls appropriately. Will continue to assess.      Problem: Safety  Goal: Will remain free from falls  Outcome: PROGRESSING AS EXPECTED     Patient educated to call for assistance. Precautions in place; Call light within reach. Bed alarm in use.  Bed locked and in lowest position. Treaded socks in place. Hourly rounding. Will continue to monitor.

## 2020-03-16 NOTE — PROCEDURES
VIDEO ELECTROENCEPHALOGRAM REPORT        Referring provider: Dr. Ho.      DOS: 3/15/2020 (total recording of 24 hours and 21 minutes).      INDICATION:  Jennifer Lopez 34 y.o. female presenting with seizures.      CURRENT ANTIEPILEPTIC REGIMEN: Briviact 100 mg q 12 hrs was added, Vimpat 200 mg q 12 hrs, Fycompa 2 mg qhs during the study.      TECHNIQUE: 30 channel video electroencephalogram (EEG) was performed in accordance with the international 10-20 system. The study was reviewed in bipolar and referential montages. The recording examined the patient during wakeful and drowsy/sleep state(s).      DESCRIPTION OF THE RECORD:  During the wakefulness, the background showed a symmetrical 10 Hz alpha activity posteriorly with amplitude of 70 mV.  There was reactivity to eye closure/opening.  A normal anterior-posterior gradient was noted with faster beta frequencies seen anteriorly.  During drowsiness, theta/delta frequencies were seen.     During the sleep state, background shows diffuse high-amplitude 4-5 Hz delta activity.  Symmetrical high-amplitude sleep spindles and vertex sharps were seen in the leads over the central regions.      ACTIVATION PROCEDURES:   Not performed.      ICTAL AND/OR INTERICTAL FINDINGS:   Frequent, independent bitemporal sharps and intermittent bitemporal slowing noted. Initially, frequent, mostly brief runs of rhythmic or Lateralized Periodic Discharges (LPDs) on either right or left temporal regions, sometimes independently but may be synchronous. Frequent, brief non convulsive seizures with onset on either right or left temporal chains, with seizures spreading bitemporally fast, but typically becoming more prominent on the right temporal chain. There were no clear clinical changes associated with the seizures, however the patient was not assessed during these otherwise not clinically detected spells. With further adjustments in anti-seizure medication, seizures have become  rare and brief, with considerable improvement of the eeg.      EKG: sampling of the EKG recording demonstrated sinus rhythm.      EVENTS:  None marked for review.      INTERPRETATION:  This is an abnormal video EEG recording in the awake, drowsy, and sleep state(s).  Frequent, independent bitemporal sharps and intermittent bitemporal slowing noted. Initially, frequent, mostly brief runs of rhythmic or Lateralized Periodic Discharges (LPDs) on either right or left temporal regions, sometimes independently but may be synchronous. Frequent, brief non convulsive seizures with onset on either right or left temporal chains, with seizures spreading fast bitemporally, but typically becoming more prominent on the right temporal chain were captured. There were no clear clinical changes associated with the seizures, however the patient's mentation was not assessed during these otherwise not clinically detected spells. With further adjustments in anti-seizure medication, seizures have become rare and brief, with considerable improvement of the eeg. The findings suggest multifocal, refractory epilepsy with bitemporal epileptogenicity. Clinical and radiological correlation is recommended.     Updates provided to Dr. Garcia.            Dimitry Munoz MD   Epilepsy and Neurodiagnostics.   Clinical  of Neurology [facility].   Diplomate in Neurology, Epilepsy, and Electrodiagnostic Medicine.   Office: [phone]  Fax: [phone]

## 2020-03-16 NOTE — CARE PLAN
Problem: Communication  Goal: The ability to communicate needs accurately and effectively will improve  Outcome: PROGRESSING AS EXPECTED  Note:  service in use.     Problem: Safety  Goal: Will remain free from falls  Outcome: PROGRESSING AS EXPECTED  Note: Hourly rounding.  Non-skid socks. Bed locked & in low position. Personal belongings and call light  within reach. .      Problem: Knowledge Deficit  Goal: Knowledge of disease process/condition, treatment plan, diagnostic tests, and medications will improve  Outcome: PROGRESSING AS EXPECTED  Note: Information regarding disease process/condition explained,question answered.  Updated with plan of care, verbalized understanding.

## 2020-03-16 NOTE — PROGRESS NOTES
Patient has been emotional today but has agreed to speak with  regarding finical aspects of her care and other aspects of her care as well. Patient has had pain which has been managed with ordered medications. EEG continous still ongoing MRI pushed until EEG is completed. No other complaints or concerns at this time.

## 2020-03-16 NOTE — DIETARY
"Nutrition services: Day 2 of admit.  Jennifer Lopez is a 34 y.o. female with admitting DX of Leukopenia    RD alerted to pt with BMI <19.    Assessment:  Height: 160 cm (5' 3\")  Weight: 45 kg (99 lb 3.3 oz)  Body mass index is 17.57 kg/m²., BMI classification: Underweight    Diet/Intake: Regular diet in place with intake % of recorded meals per ADLs.    Evaluation:   1. No poor PO intake or weight loss per nutritional admit screen.  2. Current weight is pretty consistent with last admission: 46.5 kg 1/18/2019, 3% loss in 14 months (insignifcant).   3. No RD interventions indicated @ this time.    Malnutrition Risk: None identified.     Recommendations/Plan:  1. Encourage intake of meals and document all PO intake in ADLs to provide interdisciplinary communication across all shifts.   2. Monitor weight.  3. Nutrition Representative will continue to see pt for ongoing meal and snack preferences.     RD available as further indicated.             "

## 2020-03-17 LAB
ANION GAP SERPL CALC-SCNC: 8 MMOL/L (ref 7–16)
BASOPHILS # BLD AUTO: 0 % (ref 0–1.8)
BASOPHILS # BLD: 0 K/UL (ref 0–0.12)
BUN SERPL-MCNC: 18 MG/DL (ref 8–22)
CALCIUM SERPL-MCNC: 8.6 MG/DL (ref 8.5–10.5)
CHLORIDE SERPL-SCNC: 110 MMOL/L (ref 96–112)
CO2 SERPL-SCNC: 24 MMOL/L (ref 20–33)
CREAT SERPL-MCNC: 0.81 MG/DL (ref 0.5–1.4)
EOSINOPHIL # BLD AUTO: 0 K/UL (ref 0–0.51)
EOSINOPHIL NFR BLD: 0 % (ref 0–6.9)
ERYTHROCYTE [DISTWIDTH] IN BLOOD BY AUTOMATED COUNT: 44.2 FL (ref 35.9–50)
GLUCOSE SERPL-MCNC: 110 MG/DL (ref 65–99)
HCT VFR BLD AUTO: 32.1 % (ref 37–47)
HGB BLD-MCNC: 9.8 G/DL (ref 12–16)
IMM GRANULOCYTES # BLD AUTO: 0.01 K/UL (ref 0–0.11)
IMM GRANULOCYTES NFR BLD AUTO: 0.3 % (ref 0–0.9)
LYMPHOCYTES # BLD AUTO: 1.21 K/UL (ref 1–4.8)
LYMPHOCYTES NFR BLD: 38.2 % (ref 22–41)
MCH RBC QN AUTO: 22.6 PG (ref 27–33)
MCHC RBC AUTO-ENTMCNC: 30.5 G/DL (ref 33.6–35)
MCV RBC AUTO: 74.1 FL (ref 81.4–97.8)
MONOCYTES # BLD AUTO: 0.27 K/UL (ref 0–0.85)
MONOCYTES NFR BLD AUTO: 8.5 % (ref 0–13.4)
NEUTROPHILS # BLD AUTO: 1.68 K/UL (ref 2–7.15)
NEUTROPHILS NFR BLD: 53 % (ref 44–72)
NRBC # BLD AUTO: 0 K/UL
NRBC BLD-RTO: 0 /100 WBC
PLATELET # BLD AUTO: 166 K/UL (ref 164–446)
PMV BLD AUTO: 9.1 FL (ref 9–12.9)
POTASSIUM SERPL-SCNC: 3.9 MMOL/L (ref 3.6–5.5)
RBC # BLD AUTO: 4.33 M/UL (ref 4.2–5.4)
SODIUM SERPL-SCNC: 142 MMOL/L (ref 135–145)
WBC # BLD AUTO: 3.2 K/UL (ref 4.8–10.8)

## 2020-03-17 PROCEDURE — A9270 NON-COVERED ITEM OR SERVICE: HCPCS | Performed by: STUDENT IN AN ORGANIZED HEALTH CARE EDUCATION/TRAINING PROGRAM

## 2020-03-17 PROCEDURE — 99232 SBSQ HOSP IP/OBS MODERATE 35: CPT | Mod: GC | Performed by: INTERNAL MEDICINE

## 2020-03-17 PROCEDURE — 700102 HCHG RX REV CODE 250 W/ 637 OVERRIDE(OP): Performed by: STUDENT IN AN ORGANIZED HEALTH CARE EDUCATION/TRAINING PROGRAM

## 2020-03-17 PROCEDURE — A9270 NON-COVERED ITEM OR SERVICE: HCPCS | Performed by: PSYCHIATRY & NEUROLOGY

## 2020-03-17 PROCEDURE — 85025 COMPLETE CBC W/AUTO DIFF WBC: CPT

## 2020-03-17 PROCEDURE — 700111 HCHG RX REV CODE 636 W/ 250 OVERRIDE (IP): Performed by: PSYCHIATRY & NEUROLOGY

## 2020-03-17 PROCEDURE — 99232 SBSQ HOSP IP/OBS MODERATE 35: CPT | Performed by: PSYCHIATRY & NEUROLOGY

## 2020-03-17 PROCEDURE — 770001 HCHG ROOM/CARE - MED/SURG/GYN PRIV*

## 2020-03-17 PROCEDURE — C9254 INJECTION, LACOSAMIDE: HCPCS | Performed by: PSYCHIATRY & NEUROLOGY

## 2020-03-17 PROCEDURE — 700102 HCHG RX REV CODE 250 W/ 637 OVERRIDE(OP): Performed by: PSYCHIATRY & NEUROLOGY

## 2020-03-17 PROCEDURE — 36415 COLL VENOUS BLD VENIPUNCTURE: CPT

## 2020-03-17 PROCEDURE — 80048 BASIC METABOLIC PNL TOTAL CA: CPT

## 2020-03-17 PROCEDURE — 700101 HCHG RX REV CODE 250: Performed by: PSYCHIATRY & NEUROLOGY

## 2020-03-17 PROCEDURE — 700105 HCHG RX REV CODE 258: Performed by: PSYCHIATRY & NEUROLOGY

## 2020-03-17 RX ADMIN — ACETAMINOPHEN 650 MG: 325 TABLET, FILM COATED ORAL at 06:27

## 2020-03-17 RX ADMIN — POLYETHYLENE GLYCOL 3350 1 PACKET: 17 POWDER, FOR SOLUTION ORAL at 06:28

## 2020-03-17 RX ADMIN — PERAMPANEL 2 MG: 2 TABLET ORAL at 20:15

## 2020-03-17 RX ADMIN — SODIUM CHLORIDE 200 MG: 9 INJECTION, SOLUTION INTRAVENOUS at 06:27

## 2020-03-17 RX ADMIN — BRIVARACETAM 100 MG: 50 INJECTION, SUSPENSION INTRAVENOUS at 09:04

## 2020-03-17 RX ADMIN — SODIUM CHLORIDE 200 MG: 9 INJECTION, SOLUTION INTRAVENOUS at 16:47

## 2020-03-17 RX ADMIN — SENNOSIDES AND DOCUSATE SODIUM 2 TABLET: 8.6; 5 TABLET ORAL at 06:27

## 2020-03-17 RX ADMIN — BRIVARACETAM 100 MG: 50 INJECTION, SUSPENSION INTRAVENOUS at 16:42

## 2020-03-17 RX ADMIN — ACETAMINOPHEN 650 MG: 325 TABLET, FILM COATED ORAL at 22:26

## 2020-03-17 RX ADMIN — FERROUS SULFATE TAB 325 MG (65 MG ELEMENTAL FE) 325 MG: 325 (65 FE) TAB at 09:02

## 2020-03-17 ASSESSMENT — ENCOUNTER SYMPTOMS
CHILLS: 0
HEMOPTYSIS: 0
FEVER: 0
DIARRHEA: 0
DOUBLE VISION: 0
COUGH: 0
ABDOMINAL PAIN: 1
SENSORY CHANGE: 0
HEADACHES: 0
HALLUCINATIONS: 0
WEAKNESS: 0
BACK PAIN: 0
VOMITING: 0
NAUSEA: 0
NECK PAIN: 0
SHORTNESS OF BREATH: 0
BLOOD IN STOOL: 0
DEPRESSION: 0
SEIZURES: 0
CLAUDICATION: 0
WEIGHT LOSS: 0
PALPITATIONS: 0

## 2020-03-17 NOTE — CARE PLAN
Problem: Safety  Goal: Will remain free from injury  Outcome: PROGRESSING AS EXPECTED  Intervention: Educate patient and significant other/support system about adaptive mobility strategies and safe transfers  Note: Round on patient hourly, bed locked and in lowest position, call light at reach, advised to call for assistance.       Problem: Infection  Goal: Will remain free from infection  Outcome: PROGRESSING AS EXPECTED  Intervention: Assess signs and symptoms of infection  Note: Patient educated regarding infection control including hand hygiene. Patient educated regarding s/s of infection including pain, redness, and swelling. Patient encouraged to discuss any concerns with care team.

## 2020-03-17 NOTE — PSYCHIATRY
"PSYCHOLOGICAL FOLLOW-UP:  Reason for admission: Seizure (HCC)  Supervising Physician: Unique Coleman MD    Length of Visit: 20 Mins    Legal status: Legal Status: Involuntary    Chief Complaint: \"I had a seizure\"    HPI: 33 year old female with history of seizure disorder presented to hospital after experiencing multiple seizures. Patient also fell and struck the right side of her head. CT head negative. Patient was reportedly disoriented and agitated following this episode. While in the hospital, pt expressed suicidal ideation to a  and was placed on a legal hold.      Using the  ipad, we discussed her current situation. She states she no longer has suicidal thoughts and just wants to go home and take care of her children. She denies a depressed mood and rates her mood 9/10. She says her  is currently taking the kids to school, driving to [employer] for work, picking the kids up , and repeating daily. She also states she is eating and sleeping well. Denies hallucinations, delusions, homicidal thoughts, or suicidal thoughts. At the moment the patient does not have any insurance, and their are no beds available for her to occupy short or long term.     Psychiatric Examination:  Vitals: Blood pressure 104/60, pulse 65, temperature 36.4 °C (97.6 °F), resp. rate 16, height 1.626 m (5' 4.02\"), weight 48.3 kg (106 lb 7.7 oz), last menstrual period 09/18/2018, SpO2 98 %, not currently breastfeeding.  Musculoskeletal: Normal psychomotor activity, no tics or unusual mannerisms noted  Appearance and Eye Contact: Ms. Ruiz is ppropriate dress and grooming. Behavior is calm, cooperative,  appropriate eye contact  Attention/Alertness: Alert  Thought Process: Linear, Logical and Goal Directed    Thought Content: Normal  Speech: Clear with normal rate and rhythm  Mood: \"Brighter\"            Affect: Congruent with mood         SI/HI: Denies    Memory: Recent and remote memory " Lov: 2/4/20 appear intact    Orientation: alert, oriented to person, place and time  Insight into symptoms: fair  Judgement into symptoms:poor    ASSESSMENT:       DSM5 Diagnostic Considerations:   Adjustment disorder with depressed mood    PLAN:  Legal status: Involuntary  Anticipate F/U within 48 hours.   Records reviewed: Yes  Discussed patient with other provider: Yes  Will continue to follow     JORY Block

## 2020-03-17 NOTE — PROGRESS NOTES
Daily Progress Note:     Date of Service: 3/17/2020  Primary Team: [facility] IM Green Team   Attending: Fabian Quinn M.D.   Senior Resident: Dr. Alcaraz  Intern: Dr. De La Torre.  Contact:  [phone]    Chief Complaint:   Abnormal labs (WBCs of 1.9).    Patient ID:  ---------------  34 years old female with past medical history significant for seizure disorder(temporal lobe epilepsy) on Keppra and Zonisamide, anxiety/depression on Escitalopram, HIT.  Admitted to the hospital after she was found to have pancytopenia with WBCs of 1.9/ANC 0.81, patient was recently switched from lamotrigine to zonisamide after she developed side effect(rash).  Patient has had frequent convulsion despite being on medication.patient states that for the last week she has not been taking her medications as prescribed.  Patient had 2 episodes of seizure last week(03/09 and 03/11).  Patient is admitted for further evaluation of pancytopenia and antiepileptic medication adjustment.    Subjective:  - No acute events overnight  - VSS.  - patient was complaining of abdominal pain this morning 5/10 in intensity which got better after she had BM.  - patient didn't experience any seizure episodes overnight.  - labs remarkable for WBCs trending up to 3.2 K.   - MRI brain with no changes from previous one.   - Pending BM biopsy result.  - Neurology on board. Awaiting recommendations regarding antieplitic meds adjustment.     Consultants/Specialty:  - Neurology.  - Heme/onc.    Review of Systems:    Review of Systems   Constitutional: Negative for chills, fever and weight loss.   Eyes: Negative for double vision.   Respiratory: Negative for cough, hemoptysis and shortness of breath.    Cardiovascular: Negative for palpitations, claudication and leg swelling.   Gastrointestinal: Positive for abdominal pain. Negative for blood in stool, diarrhea, nausea and vomiting.   Genitourinary: Negative for dysuria, frequency, hematuria and urgency.   Musculoskeletal:  Negative for back pain and neck pain.   Skin: Negative for itching and rash.   Neurological: Negative for sensory change (pain in the left side of the body.), seizures, weakness and headaches.   Psychiatric/Behavioral: Negative for depression, hallucinations and suicidal ideas.       Objective Data:   Physical Exam:   Vitals:   Temp:  [36.5 °C (97.7 °F)-36.8 °C (98.3 °F)] 36.8 °C (98.3 °F)  Pulse:  [59-67] 59  Resp:  [16-18] 17  BP: (102-115)/(57-63) 106/57  SpO2:  [96 %-100 %] 100 %     Physical Exam  Vitals signs and nursing note reviewed.   Constitutional:       General: She is not in acute distress.     Appearance: Normal appearance. She is not ill-appearing.   HENT:      Head: Normocephalic and atraumatic.      Nose: Nose normal. No congestion or rhinorrhea.   Eyes:      Extraocular Movements: Extraocular movements intact.      Conjunctiva/sclera: Conjunctivae normal.      Pupils: Pupils are equal, round, and reactive to light.   Cardiovascular:      Rate and Rhythm: Normal rate and regular rhythm.      Pulses: Normal pulses.      Heart sounds: Normal heart sounds. No murmur. No friction rub. No gallop.    Pulmonary:      Effort: Pulmonary effort is normal. No respiratory distress.      Breath sounds: Normal breath sounds. No wheezing or rales.   Abdominal:      General: Abdomen is flat. Bowel sounds are normal. There is no distension.      Palpations: Abdomen is soft.      Tenderness: There is no abdominal tenderness.   Musculoskeletal:      Right lower leg: No edema.      Left lower leg: No edema.   Skin:     Coloration: Skin is not jaundiced.      Findings: No bruising.   Neurological:      General: No focal deficit present.      Mental Status: She is alert and oriented to person, place, and time.      Cranial Nerves: No cranial nerve deficit.   Psychiatric:      Comments: Tearful.           Labs:   Lab Results   Component Value Date/Time    WBC 3.2 (L) 03/17/2020 12:45 AM    RBC 4.33 03/17/2020 12:45 AM     HEMOGLOBIN 9.8 (L) 03/17/2020 12:45 AM    HEMATOCRIT 32.1 (L) 03/17/2020 12:45 AM    MCV 74.1 (L) 03/17/2020 12:45 AM    MCH 22.6 (L) 03/17/2020 12:45 AM    MCHC 30.5 (L) 03/17/2020 12:45 AM    MPV 9.1 03/17/2020 12:45 AM    NEUTSPOLYS 53.00 03/17/2020 12:45 AM    LYMPHOCYTES 38.20 03/17/2020 12:45 AM    MONOCYTES 8.50 03/17/2020 12:45 AM    EOSINOPHILS 0.00 03/17/2020 12:45 AM    BASOPHILS 0.00 03/17/2020 12:45 AM    HYPOCHROMIA 1+ 02/04/2013 05:48 AM    ANISOCYTOSIS 3+ 03/12/2020 06:35 AM      Lab Results   Component Value Date/Time    SODIUM 142 03/17/2020 12:53 AM    POTASSIUM 3.9 03/17/2020 12:53 AM    CHLORIDE 110 03/17/2020 12:53 AM    CO2 24 03/17/2020 12:53 AM    GLUCOSE 110 (H) 03/17/2020 12:53 AM    BUN 18 03/17/2020 12:53 AM    CREATININE 0.81 03/17/2020 12:53 AM    CREATININE 0.8 12/04/2008 04:45 PM      Lab Results   Component Value Date/Time    PROTHROMBTM 13.2 03/13/2020 08:47 AM    INR 0.99 03/13/2020 08:47 AM     Imaging:   MR-BRAIN-W/O   Final Result      1.  Left hippocampal sclerosis (mesial temporal sclerosis) with no significant change from 1/18/2019   2.  No acute findings or significant interval change compared with 1/18/2019.      EC-ECHOCARDIOGRAM COMPLETE W/O CONT   Final Result      CT-HEAD W/O   Final Result      No evidence of acute intracranial process.      DX-CHEST-PORTABLE (1 VIEW)   Final Result      No evidence of acute cardiopulmonary process.            * Pancytopenia (HCC)- (present on admission)  Assessment & Plan  - Patient has noted mild anemia, leukopenia, mild thrombocytopenia  - Likely acute-subacute related to medication effect with zonisamide but can not rule out hematologic malignancy or infiltrative bone marrow process.  - HIV, hepatitis panel , B12/Folate all WNL.  - Iron studies c/w iron deficiency anaemia.  -No peripheral smear abnormalities concerning for malignancy but paucicellular per discussion with pathologist Dr. Marcelino who recommends bone marrow  biopsy.  - pending BM biopsy report.  - oncology on board, appreciate their recs.  @Plan:  - Continue to hold zonisamide  - CTM for s/s.    Seizure disorder (HCC)- (present on admission)  Assessment & Plan  - Patient on keppra and zonisamide( previously on lamotrigine ,stopped on 01/29 due to side effects).  - Frequent seizures despite being on medication.  - Neurology on board, appreciate recommendations.   - differential include true epileptic seizure vs. Psychogenic seizure.  - TTE with bubble with no evidence of shunt.  - MRI brain with no changes from previous one.  @Plan:  - Seizure precautions  - Ativan PRN for breakthrough.  - Lacosamide 200 mg IVPB q12hr and brivaracetam 100 mg IV BID + Fyconda.  - stop zonisamide and keppra.    Depression, major, recurrent, moderate (HCC)- (present on admission)  Assessment & Plan  - Patient has depression, per notes has refused psychiatry/psychology   - previously on lexapro, patient stated she continues to take but not listed on medication reconciliation    Iron defeciency anemia- (present on admission)  Assessment & Plan  - Hgb 10 on admission.  - iron studies C/W iron deficiency anaemia.  - patient denies nay heavy menstrual periods,bleeding from other orifices or Phx of similar condition.  - Folate/B12 levels WNL.   @Plan:-  - PO iron supplements.  - Pending BM biopsy result.  - will CTM.      Asymptomatic bacteriuria- (present on admission)  Assessment & Plan  - Patient has no urinary symptoms despite +LE and bacteria  @Plan:  - No treatment at this time

## 2020-03-17 NOTE — CARE PLAN
Problem: Communication  Goal: The ability to communicate needs accurately and effectively will improve  Outcome: PROGRESSING AS EXPECTED  Note:  service in use, call light at reach.     Problem: Knowledge Deficit  Goal: Knowledge of disease process/condition, treatment plan, diagnostic tests, and medications will improve  Outcome: PROGRESSING AS EXPECTED  Note: Information regarding disease process/condition explained,question answered.  Updated with plan of care, verbalized understanding.

## 2020-03-18 LAB
BASOPHILS # BLD AUTO: 0 % (ref 0–1.8)
BASOPHILS # BLD: 0 K/UL (ref 0–0.12)
EOSINOPHIL # BLD AUTO: 0 K/UL (ref 0–0.51)
EOSINOPHIL NFR BLD: 0 % (ref 0–6.9)
ERYTHROCYTE [DISTWIDTH] IN BLOOD BY AUTOMATED COUNT: 44.4 FL (ref 35.9–50)
HCT VFR BLD AUTO: 30.3 % (ref 37–47)
HGB BLD-MCNC: 9.3 G/DL (ref 12–16)
IMM GRANULOCYTES # BLD AUTO: 0 K/UL (ref 0–0.11)
IMM GRANULOCYTES NFR BLD AUTO: 0 % (ref 0–0.9)
LYMPHOCYTES # BLD AUTO: 1.44 K/UL (ref 1–4.8)
LYMPHOCYTES NFR BLD: 43.5 % (ref 22–41)
MCH RBC QN AUTO: 22.9 PG (ref 27–33)
MCHC RBC AUTO-ENTMCNC: 30.7 G/DL (ref 33.6–35)
MCV RBC AUTO: 74.6 FL (ref 81.4–97.8)
MONOCYTES # BLD AUTO: 0.27 K/UL (ref 0–0.85)
MONOCYTES NFR BLD AUTO: 8.2 % (ref 0–13.4)
NEUTROPHILS # BLD AUTO: 1.6 K/UL (ref 2–7.15)
NEUTROPHILS NFR BLD: 48.3 % (ref 44–72)
NRBC # BLD AUTO: 0 K/UL
NRBC BLD-RTO: 0 /100 WBC
PLATELET # BLD AUTO: 137 K/UL (ref 164–446)
PMV BLD AUTO: 9.4 FL (ref 9–12.9)
RBC # BLD AUTO: 4.06 M/UL (ref 4.2–5.4)
WBC # BLD AUTO: 3.3 K/UL (ref 4.8–10.8)

## 2020-03-18 PROCEDURE — 99232 SBSQ HOSP IP/OBS MODERATE 35: CPT | Performed by: PSYCHIATRY & NEUROLOGY

## 2020-03-18 PROCEDURE — 700102 HCHG RX REV CODE 250 W/ 637 OVERRIDE(OP): Performed by: PSYCHIATRY & NEUROLOGY

## 2020-03-18 PROCEDURE — 700102 HCHG RX REV CODE 250 W/ 637 OVERRIDE(OP): Performed by: STUDENT IN AN ORGANIZED HEALTH CARE EDUCATION/TRAINING PROGRAM

## 2020-03-18 PROCEDURE — A9270 NON-COVERED ITEM OR SERVICE: HCPCS | Performed by: PSYCHIATRY & NEUROLOGY

## 2020-03-18 PROCEDURE — 770001 HCHG ROOM/CARE - MED/SURG/GYN PRIV*

## 2020-03-18 PROCEDURE — 700111 HCHG RX REV CODE 636 W/ 250 OVERRIDE (IP): Performed by: STUDENT IN AN ORGANIZED HEALTH CARE EDUCATION/TRAINING PROGRAM

## 2020-03-18 PROCEDURE — C9254 INJECTION, LACOSAMIDE: HCPCS | Performed by: PSYCHIATRY & NEUROLOGY

## 2020-03-18 PROCEDURE — 700105 HCHG RX REV CODE 258: Performed by: STUDENT IN AN ORGANIZED HEALTH CARE EDUCATION/TRAINING PROGRAM

## 2020-03-18 PROCEDURE — 700111 HCHG RX REV CODE 636 W/ 250 OVERRIDE (IP): Performed by: PSYCHIATRY & NEUROLOGY

## 2020-03-18 PROCEDURE — 700105 HCHG RX REV CODE 258: Performed by: PSYCHIATRY & NEUROLOGY

## 2020-03-18 PROCEDURE — 85025 COMPLETE CBC W/AUTO DIFF WBC: CPT

## 2020-03-18 PROCEDURE — 36415 COLL VENOUS BLD VENIPUNCTURE: CPT

## 2020-03-18 PROCEDURE — 700101 HCHG RX REV CODE 250: Performed by: PSYCHIATRY & NEUROLOGY

## 2020-03-18 PROCEDURE — A9270 NON-COVERED ITEM OR SERVICE: HCPCS | Performed by: STUDENT IN AN ORGANIZED HEALTH CARE EDUCATION/TRAINING PROGRAM

## 2020-03-18 PROCEDURE — 99233 SBSQ HOSP IP/OBS HIGH 50: CPT | Mod: GC | Performed by: INTERNAL MEDICINE

## 2020-03-18 RX ORDER — LACOSAMIDE 100 MG/1
200 TABLET ORAL 2 TIMES DAILY
Status: DISCONTINUED | OUTPATIENT
Start: 2020-03-18 | End: 2020-03-21 | Stop reason: HOSPADM

## 2020-03-18 RX ADMIN — SENNOSIDES AND DOCUSATE SODIUM 2 TABLET: 8.6; 5 TABLET ORAL at 04:41

## 2020-03-18 RX ADMIN — BRIVARACETAM 100 MG: 50 TABLET, FILM COATED ORAL at 17:28

## 2020-03-18 RX ADMIN — SODIUM CHLORIDE 125 MG: 9 INJECTION, SOLUTION INTRAVENOUS at 10:59

## 2020-03-18 RX ADMIN — FERROUS SULFATE TAB 325 MG (65 MG ELEMENTAL FE) 325 MG: 325 (65 FE) TAB at 09:01

## 2020-03-18 RX ADMIN — LACOSAMIDE 200 MG: 100 TABLET, FILM COATED ORAL at 17:28

## 2020-03-18 RX ADMIN — BRIVARACETAM 100 MG: 50 INJECTION, SUSPENSION INTRAVENOUS at 04:40

## 2020-03-18 RX ADMIN — SODIUM CHLORIDE 200 MG: 9 INJECTION, SOLUTION INTRAVENOUS at 04:41

## 2020-03-18 RX ADMIN — ACETAMINOPHEN 650 MG: 325 TABLET, FILM COATED ORAL at 17:28

## 2020-03-18 RX ADMIN — PERAMPANEL 2 MG: 2 TABLET ORAL at 20:05

## 2020-03-18 ASSESSMENT — ENCOUNTER SYMPTOMS
DEPRESSION: 0
WEIGHT LOSS: 0
CHILLS: 0
DOUBLE VISION: 0
FEVER: 0
VOMITING: 0
BACK PAIN: 0
WEAKNESS: 0
CLAUDICATION: 0
HEADACHES: 0
HALLUCINATIONS: 0
PALPITATIONS: 0
ABDOMINAL PAIN: 1
SENSORY CHANGE: 0
NAUSEA: 0
SEIZURES: 0
COUGH: 0
DIARRHEA: 0
HEMOPTYSIS: 0
SHORTNESS OF BREATH: 0
NECK PAIN: 0
BLOOD IN STOOL: 0

## 2020-03-18 NOTE — DISCHARGE PLANNING
[facility] Transitional Care Coordination     Referral from:  Dr. De La Torre  Facesheet indicates: [place] Emergency Medicaid Pending  Potential Rehab Diagnosis:  Seizures    Chart review indicates patient has on going medical management.  Chart does not yet demonstrate therapy needs to possibly meet inpatient rehab facility criteria with the goal of returning to community.    D/C support:  Needs clarification     Physiatry consultation pended per protocol while waiting for additional information to determine appropriateness for inpatient rehab.  Would welcome PT/OT evals - as clinically appropriate - to assist with determination for post acute care needs.  TCC following.     Please note - [place] Emergency Medicaid is not a provider for [facility].       Thank you for the referral.

## 2020-03-18 NOTE — CARE PLAN
Problem: Psychosocial Needs:  Goal: Level of anxiety will decrease  Outcome: PROGRESSING AS EXPECTED  Note: Allowed patient to talk about her feelings, educated on deep breathing techniques to cope with the anxiety. Encouraged family to call patient frequently  for emotional support.      Problem: Communication  Goal: The ability to communicate needs accurately and effectively will improve  Outcome: PROGRESSING AS EXPECTED  Note:  service in use.     Problem: Safety  Goal: Will remain free from falls  Outcome: PROGRESSING AS EXPECTED  Note: Hourly rounding.  Non-skid socks. Bed locked & in low position. Personal belongings and call light  within reach. .
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
## 2020-03-18 NOTE — PROGRESS NOTES
Daily Progress Note:     Date of Service: 3/18/2020  Primary Team: [facility] IM Green Team   Attending: Fabian Quinn M.D.   Senior Resident: Dr. Alcaraz  Intern: Dr. De La Torre.  Contact:  [phone]    Chief Complaint:   Abnormal labs (WBCs of 1.9).    Patient ID:  ---------------  34 years old female with past medical history significant for seizure disorder(temporal lobe epilepsy) on Keppra and Zonisamide, anxiety/depression on Escitalopram, HIT.  Admitted to the hospital after she was found to have pancytopenia with WBCs of 1.9/ANC 0.81, patient was recently switched from lamotrigine to zonisamide after she developed side effect(rash).  Patient has had frequent convulsion despite being on medication.patient states that for the last week she has not been taking her medications as prescribed.  Patient had 2 episodes of seizure last week(03/09 and 03/11).  Patient is admitted for further evaluation of pancytopenia and antiepileptic medication adjustment.    Subjective:  - No acute events overnight  - VSS.  - patient didn't experience any seizure episodes overnight.  - labs remarkable for WBCs trending up to 3.3 K.   - BM biopsy unremarkable except for findings C/W iron store depletion.  - patient didn't tolerate oral iron, will start patient on IV iron and D/C oral iron.  - Neurology on board. Awaiting recommendations regarding antieplitic meds adjustment.  - psychiatry consulted, appreciate recommendations.    Consultants/Specialty:  - Neurology.  - psychiatry.    Review of Systems:    Review of Systems   Constitutional: Negative for chills, fever and weight loss.   Eyes: Negative for double vision.   Respiratory: Negative for cough, hemoptysis and shortness of breath.    Cardiovascular: Negative for palpitations, claudication and leg swelling.   Gastrointestinal: Positive for abdominal pain. Negative for blood in stool, diarrhea, nausea and vomiting.   Genitourinary: Negative for dysuria, frequency, hematuria and  urgency.   Musculoskeletal: Negative for back pain and neck pain.   Skin: Negative for itching and rash.   Neurological: Negative for sensory change (pain in the left side of the body.), seizures, weakness and headaches.   Psychiatric/Behavioral: Negative for depression, hallucinations and suicidal ideas.       Objective Data:   Physical Exam:   Vitals:   Temp:  [36.3 °C (97.4 °F)-37 °C (98.6 °F)] 37 °C (98.6 °F)  Pulse:  [60-85] 85  Resp:  [16-18] 16  BP: (104-106)/(56-79) 105/79  SpO2:  [99 %-100 %] 100 %     Physical Exam  Vitals signs and nursing note reviewed.   Constitutional:       General: She is not in acute distress.     Appearance: Normal appearance. She is not ill-appearing.   HENT:      Head: Normocephalic and atraumatic.      Nose: Nose normal. No congestion or rhinorrhea.   Eyes:      Extraocular Movements: Extraocular movements intact.      Conjunctiva/sclera: Conjunctivae normal.      Pupils: Pupils are equal, round, and reactive to light.   Cardiovascular:      Rate and Rhythm: Normal rate and regular rhythm.      Pulses: Normal pulses.      Heart sounds: Normal heart sounds. No murmur. No friction rub. No gallop.    Pulmonary:      Effort: Pulmonary effort is normal. No respiratory distress.      Breath sounds: Normal breath sounds. No wheezing or rales.   Abdominal:      General: Abdomen is flat. Bowel sounds are normal. There is no distension.      Palpations: Abdomen is soft.      Tenderness: There is no abdominal tenderness.   Musculoskeletal:      Right lower leg: No edema.      Left lower leg: No edema.   Skin:     Coloration: Skin is not jaundiced.      Findings: No bruising.   Neurological:      General: No focal deficit present.      Mental Status: She is alert and oriented to person, place, and time.      Cranial Nerves: No cranial nerve deficit.   Psychiatric:      Comments: Tearful.           Labs:   Lab Results   Component Value Date/Time    WBC 3.3 (L) 03/18/2020 03:05 AM    RBC 4.06  (L) 03/18/2020 03:05 AM    HEMOGLOBIN 9.3 (L) 03/18/2020 03:05 AM    HEMATOCRIT 30.3 (L) 03/18/2020 03:05 AM    MCV 74.6 (L) 03/18/2020 03:05 AM    MCH 22.9 (L) 03/18/2020 03:05 AM    MCHC 30.7 (L) 03/18/2020 03:05 AM    MPV 9.4 03/18/2020 03:05 AM    NEUTSPOLYS 48.30 03/18/2020 03:05 AM    LYMPHOCYTES 43.50 (H) 03/18/2020 03:05 AM    MONOCYTES 8.20 03/18/2020 03:05 AM    EOSINOPHILS 0.00 03/18/2020 03:05 AM    BASOPHILS 0.00 03/18/2020 03:05 AM    HYPOCHROMIA 1+ 02/04/2013 05:48 AM    ANISOCYTOSIS 3+ 03/12/2020 06:35 AM      Lab Results   Component Value Date/Time    SODIUM 142 03/17/2020 12:53 AM    POTASSIUM 3.9 03/17/2020 12:53 AM    CHLORIDE 110 03/17/2020 12:53 AM    CO2 24 03/17/2020 12:53 AM    GLUCOSE 110 (H) 03/17/2020 12:53 AM    BUN 18 03/17/2020 12:53 AM    CREATININE 0.81 03/17/2020 12:53 AM    CREATININE 0.8 12/04/2008 04:45 PM      Lab Results   Component Value Date/Time    PROTHROMBTM 13.2 03/13/2020 08:47 AM    INR 0.99 03/13/2020 08:47 AM     Imaging:   MR-BRAIN-W/O   Final Result      1.  Left hippocampal sclerosis (mesial temporal sclerosis) with no significant change from 1/18/2019   2.  No acute findings or significant interval change compared with 1/18/2019.      EC-ECHOCARDIOGRAM COMPLETE W/O CONT   Final Result      CT-HEAD W/O   Final Result      No evidence of acute intracranial process.      DX-CHEST-PORTABLE (1 VIEW)   Final Result      No evidence of acute cardiopulmonary process.            * Pancytopenia (HCC)- (present on admission)  Assessment & Plan  - Patient has noted mild anemia, leukopenia, mild thrombocytopenia  - Likely acute-subacute related to medication effect with zonisamide but can not rule out hematologic malignancy or infiltrative bone marrow process.  - HIV, hepatitis panel , B12/Folate all WNL.  - Iron studies c/w iron deficiency anaemia.  -No peripheral smear abnormalities concerning for malignancy but paucicellular per discussion with pathologist Dr. Marcelino who  recommends bone marrow biopsy.  - pending BM biopsy report.  @Plan:  - Continue to hold zonisamide  - CTM for s/s.    Seizure disorder (HCC)- (present on admission)  Assessment & Plan  - Patient on keppra and zonisamide( previously on lamotrigine ,stopped on 01/29 due to side effects).  - Frequent seizures despite being on medication.  - Neurology on board, appreciate recommendations.   - differential include true epileptic seizure vs. Psychogenic seizure.  - TTE with bubble with no evidence of shunt.  - MRI brain with no changes from previous one.  @Plan:  - Seizure precautions  - Ativan PRN for breakthrough.  - Lacosamide 200 mg IVPB q12hr and brivaracetam 100 mg IV BID + [place].  - stop zonisamide and keppra.    Depression, major, recurrent, moderate (HCC)- (present on admission)  Assessment & Plan  - Patient has depression, per notes has refused psychiatry/psychology   - previously on lexapro, patient stated she continues to take but not listed on medication reconciliation.  - psychiatry consult.    Iron defeciency anemia- (present on admission)  Assessment & Plan  - Hgb 10 on admission.  - iron studies C/W iron deficiency anaemia.  - most likely from heavy menstrual periods.  - Folate/B12 levels WNL.   - BM biopsy unremarkable except for findings C/W iron store depletion.  @Plan:-  - IV iron replacement..  - will CTM.      Asymptomatic bacteriuria- (present on admission)  Assessment & Plan  - Patient has no urinary symptoms despite +LE and bacteria  @Plan:  - No treatment at this time
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
## 2020-03-19 PROBLEM — N30.90 CYSTITIS: Status: ACTIVE | Noted: 2020-03-13

## 2020-03-19 PROBLEM — N30.90 CYSTITIS: Status: ACTIVE | Noted: 2020-03-19

## 2020-03-19 LAB
ANNOTATION COMMENT IMP: NOT DETECTED
APPEARANCE UR: ABNORMAL
B19V DNA SERPL NAA+PROBE-ACNC: <100 IU/ML
B19V DNA SERPL NAA+PROBE-LOG IU: <2 LOG IU/ML
BACTERIA #/AREA URNS HPF: ABNORMAL /HPF
BASOPHILS # BLD AUTO: 0 % (ref 0–1.8)
BASOPHILS # BLD: 0 K/UL (ref 0–0.12)
BILIRUB UR QL STRIP.AUTO: NEGATIVE
COLOR UR: YELLOW
EOSINOPHIL # BLD AUTO: 0 K/UL (ref 0–0.51)
EOSINOPHIL NFR BLD: 0 % (ref 0–6.9)
EPI CELLS #/AREA URNS HPF: ABNORMAL /HPF
ERYTHROCYTE [DISTWIDTH] IN BLOOD BY AUTOMATED COUNT: 45.8 FL (ref 35.9–50)
GLUCOSE UR STRIP.AUTO-MCNC: NEGATIVE MG/DL
HCT VFR BLD AUTO: 30.9 % (ref 37–47)
HGB BLD-MCNC: 9.4 G/DL (ref 12–16)
HYALINE CASTS #/AREA URNS LPF: ABNORMAL /LPF
IMM GRANULOCYTES # BLD AUTO: 0.01 K/UL (ref 0–0.11)
IMM GRANULOCYTES NFR BLD AUTO: 0.3 % (ref 0–0.9)
KETONES UR STRIP.AUTO-MCNC: NEGATIVE MG/DL
LEUKOCYTE ESTERASE UR QL STRIP.AUTO: ABNORMAL
LYMPHOCYTES # BLD AUTO: 0.87 K/UL (ref 1–4.8)
LYMPHOCYTES NFR BLD: 24.9 % (ref 22–41)
MCH RBC QN AUTO: 23 PG (ref 27–33)
MCHC RBC AUTO-ENTMCNC: 30.4 G/DL (ref 33.6–35)
MCV RBC AUTO: 75.6 FL (ref 81.4–97.8)
MICRO URNS: ABNORMAL
MONOCYTES # BLD AUTO: 0.27 K/UL (ref 0–0.85)
MONOCYTES NFR BLD AUTO: 7.7 % (ref 0–13.4)
NEUTROPHILS # BLD AUTO: 2.35 K/UL (ref 2–7.15)
NEUTROPHILS NFR BLD: 67.1 % (ref 44–72)
NITRITE UR QL STRIP.AUTO: POSITIVE
NRBC # BLD AUTO: 0 K/UL
NRBC BLD-RTO: 0 /100 WBC
PH UR STRIP.AUTO: 6 [PH] (ref 5–8)
PLATELET # BLD AUTO: 141 K/UL (ref 164–446)
PMV BLD AUTO: 9.5 FL (ref 9–12.9)
PROT UR QL STRIP: NEGATIVE MG/DL
RBC # BLD AUTO: 4.09 M/UL (ref 4.2–5.4)
RBC # URNS HPF: ABNORMAL /HPF
RBC UR QL AUTO: ABNORMAL
SP GR UR STRIP.AUTO: 1.02
SPECIMEN SOURCE: NORMAL
UROBILINOGEN UR STRIP.AUTO-MCNC: 0.2 MG/DL
WBC # BLD AUTO: 3.5 K/UL (ref 4.8–10.8)
WBC #/AREA URNS HPF: ABNORMAL /HPF

## 2020-03-19 PROCEDURE — A9270 NON-COVERED ITEM OR SERVICE: HCPCS | Performed by: PSYCHIATRY & NEUROLOGY

## 2020-03-19 PROCEDURE — 700102 HCHG RX REV CODE 250 W/ 637 OVERRIDE(OP): Performed by: STUDENT IN AN ORGANIZED HEALTH CARE EDUCATION/TRAINING PROGRAM

## 2020-03-19 PROCEDURE — 99254 IP/OBS CNSLTJ NEW/EST MOD 60: CPT | Mod: GC | Performed by: PSYCHIATRY & NEUROLOGY

## 2020-03-19 PROCEDURE — 81001 URINALYSIS AUTO W/SCOPE: CPT

## 2020-03-19 PROCEDURE — 99232 SBSQ HOSP IP/OBS MODERATE 35: CPT | Mod: GC | Performed by: INTERNAL MEDICINE

## 2020-03-19 PROCEDURE — A9270 NON-COVERED ITEM OR SERVICE: HCPCS | Performed by: STUDENT IN AN ORGANIZED HEALTH CARE EDUCATION/TRAINING PROGRAM

## 2020-03-19 PROCEDURE — 85025 COMPLETE CBC W/AUTO DIFF WBC: CPT

## 2020-03-19 PROCEDURE — 700102 HCHG RX REV CODE 250 W/ 637 OVERRIDE(OP): Performed by: PSYCHIATRY & NEUROLOGY

## 2020-03-19 PROCEDURE — 700105 HCHG RX REV CODE 258: Performed by: STUDENT IN AN ORGANIZED HEALTH CARE EDUCATION/TRAINING PROGRAM

## 2020-03-19 PROCEDURE — 36415 COLL VENOUS BLD VENIPUNCTURE: CPT

## 2020-03-19 PROCEDURE — 700111 HCHG RX REV CODE 636 W/ 250 OVERRIDE (IP): Performed by: STUDENT IN AN ORGANIZED HEALTH CARE EDUCATION/TRAINING PROGRAM

## 2020-03-19 PROCEDURE — 770001 HCHG ROOM/CARE - MED/SURG/GYN PRIV*

## 2020-03-19 RX ORDER — AMOXICILLIN AND CLAVULANATE POTASSIUM 875; 125 MG/1; MG/1
1 TABLET, FILM COATED ORAL EVERY 12 HOURS
Status: DISCONTINUED | OUTPATIENT
Start: 2020-03-19 | End: 2020-03-21

## 2020-03-19 RX ORDER — FLUOXETINE HYDROCHLORIDE 20 MG/1
20 CAPSULE ORAL DAILY
Status: DISCONTINUED | OUTPATIENT
Start: 2020-03-20 | End: 2020-03-21 | Stop reason: HOSPADM

## 2020-03-19 RX ADMIN — LACOSAMIDE 200 MG: 100 TABLET, FILM COATED ORAL at 06:10

## 2020-03-19 RX ADMIN — LACOSAMIDE 200 MG: 100 TABLET, FILM COATED ORAL at 17:26

## 2020-03-19 RX ADMIN — SODIUM CHLORIDE 125 MG: 9 INJECTION, SOLUTION INTRAVENOUS at 06:10

## 2020-03-19 RX ADMIN — SENNOSIDES AND DOCUSATE SODIUM 2 TABLET: 8.6; 5 TABLET ORAL at 17:26

## 2020-03-19 RX ADMIN — AMOXICILLIN AND CLAVULANATE POTASSIUM 1 TABLET: 875; 125 TABLET, FILM COATED ORAL at 13:25

## 2020-03-19 RX ADMIN — BRIVARACETAM 100 MG: 50 TABLET, FILM COATED ORAL at 06:10

## 2020-03-19 RX ADMIN — AMOXICILLIN AND CLAVULANATE POTASSIUM 1 TABLET: 875; 125 TABLET, FILM COATED ORAL at 23:56

## 2020-03-19 RX ADMIN — SENNOSIDES AND DOCUSATE SODIUM 2 TABLET: 8.6; 5 TABLET ORAL at 06:10

## 2020-03-19 RX ADMIN — BRIVARACETAM 100 MG: 50 TABLET, FILM COATED ORAL at 17:26

## 2020-03-19 RX ADMIN — PERAMPANEL 2 MG: 2 TABLET ORAL at 20:38

## 2020-03-19 ASSESSMENT — ENCOUNTER SYMPTOMS
DOUBLE VISION: 0
BLOOD IN STOOL: 0
BLURRED VISION: 0
VOMITING: 0
HEMOPTYSIS: 0
WEAKNESS: 0
SHORTNESS OF BREATH: 0
PALPITATIONS: 0
DIARRHEA: 0
HEADACHES: 0
BRUISES/BLEEDS EASILY: 0
COUGH: 0
DIZZINESS: 0
BACK PAIN: 0
SEIZURES: 0
MYALGIAS: 0
CHILLS: 0
NECK PAIN: 0
FEVER: 0
SENSORY CHANGE: 0
NAUSEA: 0
CLAUDICATION: 0
WEIGHT LOSS: 0
ABDOMINAL PAIN: 1
DEPRESSION: 0
HALLUCINATIONS: 0

## 2020-03-19 ASSESSMENT — LIFESTYLE VARIABLES: SUBSTANCE_ABUSE: 0

## 2020-03-19 NOTE — PROGRESS NOTES
Daily Progress Note:     Date of Service: 3/19/2020  Primary Team: [facility] Green Team   Attending: Fabian Quinn M.D.   Senior Resident: Dr. Alcaraz  Intern: Dr. De La Torre.  Contact:  [phone]    Chief Complaint:   Abnormal labs (WBCs of 1.9).    Patient ID:  ---------------  34 years old female with past medical history significant for seizure disorder(temporal lobe epilepsy) on Keppra and Zonisamide, anxiety/depression on Escitalopram, HIT.  Admitted to the hospital after she was found to have pancytopenia with WBCs of 1.9/ANC 0.81, patient was recently switched from lamotrigine to zonisamide after she developed side effect(rash).  Patient has had frequent convulsion despite being on medication.patient states that for the last week she has not been taking her medications as prescribed.  Patient had 2 episodes of seizure last week(03/09 and 03/11).  Patient is admitted for further evaluation of pancytopenia and antiepileptic medication adjustment.    Subjective:  - No acute events overnight.Patient complains of Dysuria with frquency. Will treat Accordingly based on urinalysis.  - VSS.  - patient didn't experience any seizure episodes overnight.  - labs remarkable for WBCs trending up to 3.5 K.   - patient receiving IV iron   - Neurology on board. Stable for Discharge from their perspective   - psychiatry consulted, appreciate recommendations.  - Patient has meds to beds for antiepileptics. Medicaid is pending    Consultants/Specialty:  - Neurology.  - psychiatry.    Review of Systems:    Review of Systems   Constitutional: Negative for chills, fever and weight loss.   Eyes: Negative for double vision.   Respiratory: Negative for cough, hemoptysis and shortness of breath.    Cardiovascular: Negative for palpitations, claudication and leg swelling.   Gastrointestinal: Positive for abdominal pain. Negative for blood in stool, diarrhea, nausea and vomiting.   Genitourinary: Negative for dysuria, frequency, hematuria  and urgency.   Musculoskeletal: Negative for back pain and neck pain.   Skin: Negative for itching and rash.   Neurological: Negative for sensory change (pain in the left side of the body.), seizures, weakness and headaches.   Psychiatric/Behavioral: Negative for depression, hallucinations and suicidal ideas.       Objective Data:   Physical Exam:   Vitals:   Temp:  [36.2 °C (97.2 °F)-36.7 °C (98.1 °F)] 36.7 °C (98.1 °F)  Pulse:  [60-88] 61  Resp:  [16-17] 17  BP: ()/(56-96) 99/58  SpO2:  [99 %-100 %] 100 %     Physical Exam  Vitals signs and nursing note reviewed. Exam conducted with a chaperone present.   Constitutional:       General: She is not in acute distress.     Appearance: Normal appearance. She is not ill-appearing.   HENT:      Head: Normocephalic and atraumatic.      Nose: Nose normal. No congestion or rhinorrhea.   Eyes:      Extraocular Movements: Extraocular movements intact.      Conjunctiva/sclera: Conjunctivae normal.      Pupils: Pupils are equal, round, and reactive to light.   Cardiovascular:      Rate and Rhythm: Normal rate and regular rhythm.      Pulses: Normal pulses.      Heart sounds: Normal heart sounds. No murmur. No friction rub. No gallop.    Pulmonary:      Effort: Pulmonary effort is normal. No respiratory distress.      Breath sounds: Normal breath sounds. No wheezing or rales.   Abdominal:      General: Abdomen is flat. Bowel sounds are normal. There is no distension.      Palpations: Abdomen is soft.      Tenderness: There is no abdominal tenderness.   Musculoskeletal:      Right lower leg: No edema.      Left lower leg: No edema.   Skin:     Coloration: Skin is not jaundiced.      Findings: No bruising.   Neurological:      General: No focal deficit present.      Mental Status: She is alert and oriented to person, place, and time.      Cranial Nerves: No cranial nerve deficit.   Psychiatric:      Comments: Tearful.       Labs:   Lab Results   Component Value Date/Time     WBC 3.5 (L) 03/19/2020 04:59 AM    RBC 4.09 (L) 03/19/2020 04:59 AM    HEMOGLOBIN 9.4 (L) 03/19/2020 04:59 AM    HEMATOCRIT 30.9 (L) 03/19/2020 04:59 AM    MCV 75.6 (L) 03/19/2020 04:59 AM    MCH 23.0 (L) 03/19/2020 04:59 AM    MCHC 30.4 (L) 03/19/2020 04:59 AM    MPV 9.5 03/19/2020 04:59 AM    NEUTSPOLYS 67.10 03/19/2020 04:59 AM    LYMPHOCYTES 24.90 03/19/2020 04:59 AM    MONOCYTES 7.70 03/19/2020 04:59 AM    EOSINOPHILS 0.00 03/19/2020 04:59 AM    BASOPHILS 0.00 03/19/2020 04:59 AM    HYPOCHROMIA 1+ 02/04/2013 05:48 AM    ANISOCYTOSIS 3+ 03/12/2020 06:35 AM      Lab Results   Component Value Date/Time    SODIUM 142 03/17/2020 12:53 AM    POTASSIUM 3.9 03/17/2020 12:53 AM    CHLORIDE 110 03/17/2020 12:53 AM    CO2 24 03/17/2020 12:53 AM    GLUCOSE 110 (H) 03/17/2020 12:53 AM    BUN 18 03/17/2020 12:53 AM    CREATININE 0.81 03/17/2020 12:53 AM    CREATININE 0.8 12/04/2008 04:45 PM      Lab Results   Component Value Date/Time    PROTHROMBTM 13.2 03/13/2020 08:47 AM    INR 0.99 03/13/2020 08:47 AM     Imaging:   MR-BRAIN-W/O   Final Result      1.  Left hippocampal sclerosis (mesial temporal sclerosis) with no significant change from 1/18/2019   2.  No acute findings or significant interval change compared with 1/18/2019.      EC-ECHOCARDIOGRAM COMPLETE W/O CONT   Final Result      CT-HEAD W/O   Final Result      No evidence of acute intracranial process.      DX-CHEST-PORTABLE (1 VIEW)   Final Result      No evidence of acute cardiopulmonary process.            * Pancytopenia (HCC)- (present on admission)  Assessment & Plan  - Patient has noted mild anemia, leukopenia, mild thrombocytopenia  - Likely acute-subacute related to medication effect with zonisamide but can not rule out hematologic malignancy or infiltrative bone marrow process.  - HIV, hepatitis panel , B12/Folate all WNL.  - Iron studies c/w iron deficiency anaemia.  - BM biopsy reported : iron deficiency anemia  .  @Plan:  - Continue to hold  zonisamide  - on IV iron -- discharge with PO every other day iron     Cystitis- (present on admission)  Assessment & Plan  - Patient has symptoms of Dysuria with frequency   - Urinalysis is pending    Seizure disorder (HCC)- (present on admission)  Assessment & Plan  - Patient on keppra and zonisamide( previously on lamotrigine ,stopped on 01/29 due to side effects).  - Frequent seizures despite being on medication.  - Neurology on board, appreciate recommendations.   - differential include true epileptic seizure vs. Psychogenic seizure.  - TTE with bubble with no evidence of shunt.  - MRI brain with no changes from previous one.  @Plan:  - Seizure precautions  - Ativan PRN for breakthrough.  - Neurology changed IV to PO Lacosamide 200 mg, Brivaracetam 100 mg, Perampanel 2 mg      Depression, major, recurrent, moderate (HCC)- (present on admission)  Assessment & Plan  - Patient has depression, per notes has refused psychiatry/psychology   - previously on lexapro, patient stated she continues to take but not listed on medication reconciliation.  - psychiatry consult.    Iron defeciency anemia- (present on admission)  Assessment & Plan  - Hgb 10 on admission.  - iron studies C/W iron deficiency anaemia.  - most likely from heavy menstrual periods.  - Folate/B12 levels WNL.   - BM biopsy unremarkable except for findings C/W iron store depletion.  @Plan:-  - IV iron replacement..  - will CTM.

## 2020-03-19 NOTE — PROGRESS NOTES
Patient states that she's experiencing burning pain when voiding. Paged [facility] green to provide update.   Received verbal order for a UA from Fabian Quinn MD.

## 2020-03-19 NOTE — CARE PLAN
Problem: Communication  Goal: The ability to communicate needs accurately and effectively will improve  Outcome: PROGRESSING AS EXPECTED   This RN is a qualified  & the patient has been able to express her needs. All needs met at this time.     Problem: Psychosocial Needs:  Goal: Level of anxiety will decrease  Outcome: PROGRESSING AS EXPECTED  Patient states that she feels more at ease since being here. Patient also stated different coping mechanisms when she is feeling anxious or sad, prayer being her primary coping mechanism.

## 2020-03-20 ENCOUNTER — APPOINTMENT (OUTPATIENT)
Dept: RADIOLOGY | Facility: MEDICAL CENTER | Age: 35
DRG: 809 | End: 2020-03-20
Attending: STUDENT IN AN ORGANIZED HEALTH CARE EDUCATION/TRAINING PROGRAM
Payer: MEDICAID

## 2020-03-20 LAB — EKG IMPRESSION: NORMAL

## 2020-03-20 PROCEDURE — A9270 NON-COVERED ITEM OR SERVICE: HCPCS | Performed by: STUDENT IN AN ORGANIZED HEALTH CARE EDUCATION/TRAINING PROGRAM

## 2020-03-20 PROCEDURE — 700102 HCHG RX REV CODE 250 W/ 637 OVERRIDE(OP): Performed by: STUDENT IN AN ORGANIZED HEALTH CARE EDUCATION/TRAINING PROGRAM

## 2020-03-20 PROCEDURE — A9270 NON-COVERED ITEM OR SERVICE: HCPCS | Performed by: PSYCHIATRY & NEUROLOGY

## 2020-03-20 PROCEDURE — 700105 HCHG RX REV CODE 258: Performed by: STUDENT IN AN ORGANIZED HEALTH CARE EDUCATION/TRAINING PROGRAM

## 2020-03-20 PROCEDURE — 93005 ELECTROCARDIOGRAM TRACING: CPT | Performed by: STUDENT IN AN ORGANIZED HEALTH CARE EDUCATION/TRAINING PROGRAM

## 2020-03-20 PROCEDURE — 770001 HCHG ROOM/CARE - MED/SURG/GYN PRIV*

## 2020-03-20 PROCEDURE — 700102 HCHG RX REV CODE 250 W/ 637 OVERRIDE(OP): Performed by: PSYCHIATRY & NEUROLOGY

## 2020-03-20 PROCEDURE — 700111 HCHG RX REV CODE 636 W/ 250 OVERRIDE (IP): Performed by: STUDENT IN AN ORGANIZED HEALTH CARE EDUCATION/TRAINING PROGRAM

## 2020-03-20 PROCEDURE — 74176 CT ABD & PELVIS W/O CONTRAST: CPT

## 2020-03-20 PROCEDURE — 71046 X-RAY EXAM CHEST 2 VIEWS: CPT

## 2020-03-20 PROCEDURE — 93010 ELECTROCARDIOGRAM REPORT: CPT | Performed by: INTERNAL MEDICINE

## 2020-03-20 PROCEDURE — 99233 SBSQ HOSP IP/OBS HIGH 50: CPT | Mod: GC | Performed by: INTERNAL MEDICINE

## 2020-03-20 PROCEDURE — 99232 SBSQ HOSP IP/OBS MODERATE 35: CPT | Performed by: PSYCHIATRY & NEUROLOGY

## 2020-03-20 RX ORDER — MORPHINE SULFATE 4 MG/ML
2 INJECTION, SOLUTION INTRAMUSCULAR; INTRAVENOUS ONCE
Status: COMPLETED | OUTPATIENT
Start: 2020-03-20 | End: 2020-03-20

## 2020-03-20 RX ORDER — OXYCODONE HYDROCHLORIDE 5 MG/1
5 TABLET ORAL EVERY 4 HOURS PRN
Status: DISCONTINUED | OUTPATIENT
Start: 2020-03-20 | End: 2020-03-21 | Stop reason: HOSPADM

## 2020-03-20 RX ORDER — ONDANSETRON 2 MG/ML
4 INJECTION INTRAMUSCULAR; INTRAVENOUS EVERY 4 HOURS PRN
Status: DISCONTINUED | OUTPATIENT
Start: 2020-03-20 | End: 2020-03-21 | Stop reason: HOSPADM

## 2020-03-20 RX ORDER — PHENAZOPYRIDINE HYDROCHLORIDE 200 MG/1
200 TABLET, FILM COATED ORAL
Status: DISCONTINUED | OUTPATIENT
Start: 2020-03-20 | End: 2020-03-21 | Stop reason: HOSPADM

## 2020-03-20 RX ADMIN — LACOSAMIDE 200 MG: 100 TABLET, FILM COATED ORAL at 06:01

## 2020-03-20 RX ADMIN — LACOSAMIDE 200 MG: 100 TABLET, FILM COATED ORAL at 17:58

## 2020-03-20 RX ADMIN — ONDANSETRON 4 MG: 2 INJECTION INTRAMUSCULAR; INTRAVENOUS at 08:03

## 2020-03-20 RX ADMIN — AMOXICILLIN AND CLAVULANATE POTASSIUM 1 TABLET: 875; 125 TABLET, FILM COATED ORAL at 13:40

## 2020-03-20 RX ADMIN — PHENAZOPYRIDINE HYDROCHLORIDE 200 MG: 200 TABLET ORAL at 13:40

## 2020-03-20 RX ADMIN — BRIVARACETAM 100 MG: 50 TABLET, FILM COATED ORAL at 06:01

## 2020-03-20 RX ADMIN — SENNOSIDES AND DOCUSATE SODIUM 2 TABLET: 8.6; 5 TABLET ORAL at 17:58

## 2020-03-20 RX ADMIN — AMOXICILLIN AND CLAVULANATE POTASSIUM 1 TABLET: 875; 125 TABLET, FILM COATED ORAL at 22:10

## 2020-03-20 RX ADMIN — PHENAZOPYRIDINE HYDROCHLORIDE 200 MG: 200 TABLET ORAL at 17:59

## 2020-03-20 RX ADMIN — FLUOXETINE HYDROCHLORIDE 20 MG: 20 CAPSULE ORAL at 06:01

## 2020-03-20 RX ADMIN — BRIVARACETAM 100 MG: 50 TABLET, FILM COATED ORAL at 17:58

## 2020-03-20 RX ADMIN — SODIUM CHLORIDE 250 MG: 9 INJECTION, SOLUTION INTRAVENOUS at 06:01

## 2020-03-20 RX ADMIN — ACETAMINOPHEN 650 MG: 325 TABLET, FILM COATED ORAL at 22:10

## 2020-03-20 RX ADMIN — MORPHINE SULFATE 2 MG: 4 INJECTION INTRAVENOUS at 08:03

## 2020-03-20 RX ADMIN — PERAMPANEL 2 MG: 2 TABLET ORAL at 22:10

## 2020-03-20 ASSESSMENT — ENCOUNTER SYMPTOMS
BLOOD IN STOOL: 0
HEADACHES: 0
WEAKNESS: 0
CLAUDICATION: 0
PALPITATIONS: 0
NECK PAIN: 0
ABDOMINAL PAIN: 1
HEMOPTYSIS: 0
SEIZURES: 0
DIARRHEA: 0
NAUSEA: 0
FEVER: 0
DEPRESSION: 0
VOMITING: 0
SENSORY CHANGE: 0
DOUBLE VISION: 0
COUGH: 0
CHILLS: 0
SHORTNESS OF BREATH: 0
BACK PAIN: 0
HALLUCINATIONS: 0
WEIGHT LOSS: 0

## 2020-03-20 NOTE — PROGRESS NOTES
Daily Progress Note:     Date of Service: 3/20/2020  Primary Team: [facility] Green Team   Attending: Fabian Quinn M.D.   Senior Resident: Dr. Alcaraz  Intern: Dr. De La Torre.  Contact:  [phone]    Chief Complaint:   Abnormal labs (WBCs of 1.9).    Patient ID:  ---------------  34 years old female with past medical history significant for seizure disorder(temporal lobe epilepsy) on Keppra and Zonisamide, anxiety/depression on Escitalopram, HIT.  Admitted to the hospital after she was found to have pancytopenia with WBCs of 1.9/ANC 0.81, patient was recently switched from lamotrigine to zonisamide after she developed side effect(rash).  Patient has had frequent convulsion despite being on medication.patient states that for the last week she has not been taking her medications as prescribed.  Patient had 2 episodes of seizure last week(03/09 and 03/11).  Patient is admitted for further evaluation of pancytopenia and antiepileptic medication adjustment.    Subjective:  - No acute events overnight.  - this morning patient was complaining of severe left sided pain especially in her chest and flank.EKG ordered and was unremarkable,pateint vitals were stable.patient was medicated with Iv morphine and Zofran for nausea. CXR and CT renal protocol were ordered.  - will continue with PO Augmentin for treatment of UTI.  - PO pyridium for dysuria and PRN oxycodone for pain.   - Patient was started on Prozac per psychiatry recs.    Consultants/Specialty:  - Neurology.  - psychiatry.    Review of Systems:    Review of Systems   Constitutional: Negative for chills, fever and weight loss.   Eyes: Negative for double vision.   Respiratory: Negative for cough, hemoptysis and shortness of breath.    Cardiovascular: Negative for palpitations, claudication and leg swelling.   Gastrointestinal: Positive for abdominal pain. Negative for blood in stool, diarrhea, nausea and vomiting.   Genitourinary: Negative for dysuria, frequency,  hematuria and urgency.   Musculoskeletal: Negative for back pain and neck pain.   Skin: Negative for itching and rash.   Neurological: Negative for sensory change (pain in the left side of the body.), seizures, weakness and headaches.   Psychiatric/Behavioral: Negative for depression, hallucinations and suicidal ideas.       Objective Data:   Physical Exam:   Vitals:   Temp:  [36.7 °C (98 °F)-37.4 °C (99.4 °F)] 36.7 °C (98 °F)  Pulse:  [63-67] 63  Resp:  [16-17] 17  BP: (100-111)/(56-60) 100/60  SpO2:  [96 %-100 %] 97 %     Physical Exam  Vitals signs and nursing note reviewed. Exam conducted with a chaperone present.   Constitutional:       General: She is not in acute distress.     Appearance: Normal appearance. She is not ill-appearing.   HENT:      Head: Normocephalic and atraumatic.      Nose: Nose normal. No congestion or rhinorrhea.   Eyes:      Extraocular Movements: Extraocular movements intact.      Conjunctiva/sclera: Conjunctivae normal.      Pupils: Pupils are equal, round, and reactive to light.   Cardiovascular:      Rate and Rhythm: Normal rate and regular rhythm.      Pulses: Normal pulses.      Heart sounds: Normal heart sounds. No murmur. No friction rub. No gallop.    Pulmonary:      Effort: Pulmonary effort is normal. No respiratory distress.      Breath sounds: Normal breath sounds. No wheezing or rales.   Abdominal:      General: Abdomen is flat. Bowel sounds are normal. There is no distension.      Palpations: Abdomen is soft.      Tenderness: There is no abdominal tenderness.   Musculoskeletal:      Right lower leg: No edema.      Left lower leg: No edema.   Skin:     Coloration: Skin is not jaundiced.      Findings: No bruising.   Neurological:      General: No focal deficit present.      Mental Status: She is alert and oriented to person, place, and time.      Cranial Nerves: No cranial nerve deficit.   Psychiatric:      Comments: Tearful.       Labs:   Lab Results   Component Value  Date/Time    WBC 3.5 (L) 03/19/2020 04:59 AM    RBC 4.09 (L) 03/19/2020 04:59 AM    HEMOGLOBIN 9.4 (L) 03/19/2020 04:59 AM    HEMATOCRIT 30.9 (L) 03/19/2020 04:59 AM    MCV 75.6 (L) 03/19/2020 04:59 AM    MCH 23.0 (L) 03/19/2020 04:59 AM    MCHC 30.4 (L) 03/19/2020 04:59 AM    MPV 9.5 03/19/2020 04:59 AM    NEUTSPOLYS 67.10 03/19/2020 04:59 AM    LYMPHOCYTES 24.90 03/19/2020 04:59 AM    MONOCYTES 7.70 03/19/2020 04:59 AM    EOSINOPHILS 0.00 03/19/2020 04:59 AM    BASOPHILS 0.00 03/19/2020 04:59 AM    HYPOCHROMIA 1+ 02/04/2013 05:48 AM    ANISOCYTOSIS 3+ 03/12/2020 06:35 AM      Lab Results   Component Value Date/Time    SODIUM 142 03/17/2020 12:53 AM    POTASSIUM 3.9 03/17/2020 12:53 AM    CHLORIDE 110 03/17/2020 12:53 AM    CO2 24 03/17/2020 12:53 AM    GLUCOSE 110 (H) 03/17/2020 12:53 AM    BUN 18 03/17/2020 12:53 AM    CREATININE 0.81 03/17/2020 12:53 AM    CREATININE 0.8 12/04/2008 04:45 PM      Lab Results   Component Value Date/Time    PROTHROMBTM 13.2 03/13/2020 08:47 AM    INR 0.99 03/13/2020 08:47 AM     Imaging:   DX-CHEST-2 VIEWS   Final Result      1.  Cannot exclude trace pleural effusion.   2.  No evidence of consolidation or pneumothorax.      MR-BRAIN-W/O   Final Result      1.  Left hippocampal sclerosis (mesial temporal sclerosis) with no significant change from 1/18/2019   2.  No acute findings or significant interval change compared with 1/18/2019.      EC-ECHOCARDIOGRAM COMPLETE W/O CONT   Final Result      CT-HEAD W/O   Final Result      No evidence of acute intracranial process.      DX-CHEST-PORTABLE (1 VIEW)   Final Result      No evidence of acute cardiopulmonary process.      CT-RENAL COLIC EVALUATION(A/P W/O)    (Results Pending)         * Pancytopenia (HCC)- (present on admission)  Assessment & Plan  - Patient has noted mild anemia, leukopenia, mild thrombocytopenia  - Likely acute-subacute related to medication effect with zonisamide but can not rule out hematologic malignancy or  infiltrative bone marrow process.  - HIV, hepatitis panel , B12/Folate all WNL.  - Iron studies c/w iron deficiency anaemia.  - BM biopsy reported : iron deficiency anemia  .  @Plan:  - Continue to hold zonisamide  - on IV iron -- discharge with PO every other day iron     Cystitis- (present on admission)  Assessment & Plan  - Patient has symptoms of Dysuria with frequency   - Urinalysis with evidence of UTI.  - Pending urine culture.  - patient developed acute right sided pain this morning=>will order CT renal protocol.  - will treat with Augmentin+ pyridium for dysuria + PRN Oxycodone for lank pain.    Seizure disorder (HCC)- (present on admission)  Assessment & Plan  - Patient on keppra and zonisamide( previously on lamotrigine ,stopped on 01/29 due to side effects).  - Frequent seizures despite being on medication.  - Neurology on board, appreciate recommendations.   - differential include true epileptic seizure vs. Psychogenic seizure.  - TTE with bubble with no evidence of shunt.  - MRI brain with no changes from previous one.  @Plan:  - Seizure precautions  - Ativan PRN for breakthrough.  - Neurology changed IV to PO Lacosamide 200 mg, Brivaracetam 100 mg, Perampanel 2 mg      Depression, major, recurrent, moderate (HCC)- (present on admission)  Assessment & Plan  - Patient has depression, per notes has refused psychiatry/psychology   - previously on lexapro, patient stated she continues to take but not listed on medication reconciliation.  - Prozac 20 mg.    Iron defeciency anemia- (present on admission)  Assessment & Plan  - Hgb 10 on admission.  - iron studies C/W iron deficiency anaemia.  - most likely from heavy menstrual periods.  - Folate/B12 levels WNL.   - BM biopsy unremarkable except for findings C/W iron store depletion.  @Plan:-  - IV iron replacement..  - will CTM.
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
## 2020-03-20 NOTE — PROGRESS NOTES
Pt is currently stating that she is experiencing severe left flank pain that radiates to her right side. Pain began roughly 15 minutes again. Upon assessment, the patient described it as a sore, shooting pain & that she is having difficulty taking a deep breath because it makes her pain worse. Patient states that it does not hurt when site is palpitated. She is currently moaning & crying. VSS: O2 100% on RA, 66 HR, Temp 98.5 F, /60, respiration 17. Paged [facility] green to provide update.     0745: MD at bedside.

## 2020-03-20 NOTE — CARE PLAN
Problem: Safety  Goal: Will remain free from injury  Outcome: PROGRESSING AS EXPECTED  Intervention: Educate patient and significant other/support system about adaptive mobility strategies and safe transfers  Note: Round on patient hourly, bed locked and in lowest position, call light at reach, advised to call for assistance.       Problem: Pain Management  Goal: Pain level will decrease to patient's comfort goal  Outcome: PROGRESSING AS EXPECTED  Intervention: Educate and implement non-pharmacologic comfort measures. Examples: relaxation, distration, play therapy, activity therapy, massage, etc.  Note: Educate and implement non-pharmacologic comfort measures and give PRN pain medication per MAR

## 2020-03-20 NOTE — PROGRESS NOTES
Pt medicated appropriately per MAR w/IV Morphine 2 mg for pain & w/IV Zofran 4 mg for nausea. EKG completed: sinus rhythm & no significant changes noted compared to EKG done on 3/12/2020. Chest x-ray scheduled for 0930. Patient is now currently sleeping & does not appear to be in any distress.   Will continue to monitor.

## 2020-03-21 ENCOUNTER — PATIENT OUTREACH (OUTPATIENT)
Dept: HEALTH INFORMATION MANAGEMENT | Facility: OTHER | Age: 35
End: 2020-03-21

## 2020-03-21 VITALS
WEIGHT: 99.21 LBS | RESPIRATION RATE: 18 BRPM | HEART RATE: 63 BPM | BODY MASS INDEX: 17.58 KG/M2 | SYSTOLIC BLOOD PRESSURE: 110 MMHG | DIASTOLIC BLOOD PRESSURE: 57 MMHG | HEIGHT: 63 IN | TEMPERATURE: 98.6 F | OXYGEN SATURATION: 99 %

## 2020-03-21 LAB
BASOPHILS # BLD AUTO: 0.3 % (ref 0–1.8)
BASOPHILS # BLD: 0.01 K/UL (ref 0–0.12)
EOSINOPHIL # BLD AUTO: 0 K/UL (ref 0–0.51)
EOSINOPHIL NFR BLD: 0 % (ref 0–6.9)
ERYTHROCYTE [DISTWIDTH] IN BLOOD BY AUTOMATED COUNT: 47.4 FL (ref 35.9–50)
HCT VFR BLD AUTO: 32.3 % (ref 37–47)
HGB BLD-MCNC: 9.8 G/DL (ref 12–16)
IMM GRANULOCYTES # BLD AUTO: 0.01 K/UL (ref 0–0.11)
IMM GRANULOCYTES NFR BLD AUTO: 0.3 % (ref 0–0.9)
LYMPHOCYTES # BLD AUTO: 0.82 K/UL (ref 1–4.8)
LYMPHOCYTES NFR BLD: 23 % (ref 22–41)
MCH RBC QN AUTO: 23.4 PG (ref 27–33)
MCHC RBC AUTO-ENTMCNC: 30.3 G/DL (ref 33.6–35)
MCV RBC AUTO: 77.3 FL (ref 81.4–97.8)
MONOCYTES # BLD AUTO: 0.24 K/UL (ref 0–0.85)
MONOCYTES NFR BLD AUTO: 6.7 % (ref 0–13.4)
NEUTROPHILS # BLD AUTO: 2.48 K/UL (ref 2–7.15)
NEUTROPHILS NFR BLD: 69.7 % (ref 44–72)
NRBC # BLD AUTO: 0 K/UL
NRBC BLD-RTO: 0 /100 WBC
PLATELET # BLD AUTO: 181 K/UL (ref 164–446)
PMV BLD AUTO: 10.3 FL (ref 9–12.9)
RBC # BLD AUTO: 4.18 M/UL (ref 4.2–5.4)
WBC # BLD AUTO: 3.6 K/UL (ref 4.8–10.8)

## 2020-03-21 PROCEDURE — 85025 COMPLETE CBC W/AUTO DIFF WBC: CPT

## 2020-03-21 PROCEDURE — 700105 HCHG RX REV CODE 258: Performed by: STUDENT IN AN ORGANIZED HEALTH CARE EDUCATION/TRAINING PROGRAM

## 2020-03-21 PROCEDURE — A9270 NON-COVERED ITEM OR SERVICE: HCPCS | Performed by: STUDENT IN AN ORGANIZED HEALTH CARE EDUCATION/TRAINING PROGRAM

## 2020-03-21 PROCEDURE — 700102 HCHG RX REV CODE 250 W/ 637 OVERRIDE(OP): Performed by: STUDENT IN AN ORGANIZED HEALTH CARE EDUCATION/TRAINING PROGRAM

## 2020-03-21 PROCEDURE — 700111 HCHG RX REV CODE 636 W/ 250 OVERRIDE (IP): Performed by: STUDENT IN AN ORGANIZED HEALTH CARE EDUCATION/TRAINING PROGRAM

## 2020-03-21 PROCEDURE — 700102 HCHG RX REV CODE 250 W/ 637 OVERRIDE(OP): Performed by: PSYCHIATRY & NEUROLOGY

## 2020-03-21 PROCEDURE — 99239 HOSP IP/OBS DSCHRG MGMT >30: CPT | Mod: GC | Performed by: INTERNAL MEDICINE

## 2020-03-21 PROCEDURE — A9270 NON-COVERED ITEM OR SERVICE: HCPCS | Performed by: PSYCHIATRY & NEUROLOGY

## 2020-03-21 PROCEDURE — 36415 COLL VENOUS BLD VENIPUNCTURE: CPT

## 2020-03-21 RX ORDER — SULFAMETHOXAZOLE AND TRIMETHOPRIM 800; 160 MG/1; MG/1
1 TABLET ORAL EVERY 12 HOURS
Qty: 10 TAB | Refills: 0 | Status: SHIPPED | OUTPATIENT
Start: 2020-03-21 | End: 2020-03-26

## 2020-03-21 RX ORDER — LACOSAMIDE 200 MG/1
200 TABLET ORAL 2 TIMES DAILY
Qty: 60 TAB | Refills: 2 | Status: CANCELLED | OUTPATIENT
Start: 2020-03-21 | End: 2020-04-20

## 2020-03-21 RX ORDER — FERROUS SULFATE 325(65) MG
325 TABLET ORAL
Qty: 15 TAB | Refills: 1 | Status: SHIPPED | OUTPATIENT
Start: 2020-03-21 | End: 2020-04-20

## 2020-03-21 RX ORDER — SULFAMETHOXAZOLE AND TRIMETHOPRIM 800; 160 MG/1; MG/1
1 TABLET ORAL EVERY 12 HOURS
Status: DISCONTINUED | OUTPATIENT
Start: 2020-03-21 | End: 2020-03-21 | Stop reason: HOSPADM

## 2020-03-21 RX ORDER — ACETAMINOPHEN 325 MG/1
650 TABLET ORAL EVERY 6 HOURS PRN
Qty: 30 TAB | Refills: 0 | Status: SHIPPED | OUTPATIENT
Start: 2020-03-21 | End: 2021-03-22

## 2020-03-21 RX ORDER — FLUOXETINE HYDROCHLORIDE 20 MG/1
20 CAPSULE ORAL DAILY
Qty: 30 CAP | Refills: 2 | Status: SHIPPED
Start: 2020-03-22 | End: 2020-04-14

## 2020-03-21 RX ADMIN — SODIUM CHLORIDE 250 MG: 9 INJECTION, SOLUTION INTRAVENOUS at 06:23

## 2020-03-21 RX ADMIN — LACOSAMIDE 200 MG: 100 TABLET, FILM COATED ORAL at 06:23

## 2020-03-21 RX ADMIN — PHENAZOPYRIDINE HYDROCHLORIDE 200 MG: 200 TABLET ORAL at 11:45

## 2020-03-21 RX ADMIN — BRIVARACETAM 100 MG: 50 TABLET, FILM COATED ORAL at 11:44

## 2020-03-21 RX ADMIN — SULFAMETHOXAZOLE AND TRIMETHOPRIM 1 TABLET: 800; 160 TABLET ORAL at 09:07

## 2020-03-21 RX ADMIN — FLUOXETINE HYDROCHLORIDE 20 MG: 20 CAPSULE ORAL at 06:23

## 2020-03-21 RX ADMIN — SENNOSIDES AND DOCUSATE SODIUM 2 TABLET: 8.6; 5 TABLET ORAL at 06:23

## 2020-03-21 RX ADMIN — PHENAZOPYRIDINE HYDROCHLORIDE 200 MG: 200 TABLET ORAL at 09:07

## 2020-03-21 ASSESSMENT — ENCOUNTER SYMPTOMS
HALLUCINATIONS: 0
BLURRED VISION: 0
SORE THROAT: 0
MYALGIAS: 1
ABDOMINAL PAIN: 1
DIZZINESS: 0
DIARRHEA: 0
FEVER: 0
NAUSEA: 0
DEPRESSION: 1
BRUISES/BLEEDS EASILY: 0
CONSTIPATION: 0
COUGH: 0

## 2020-03-21 ASSESSMENT — LIFESTYLE VARIABLES: SUBSTANCE_ABUSE: 0

## 2020-03-21 NOTE — DISCHARGE SUMMARY
Discharge Summary    Date of Admission: 3/12/2020  Date of Discharge: 03/21/2020  Discharging Attending: Fabian Quinn M.D.   Discharging Senior Resident: Dr. Alcaraz.  Discharging Intern: Dr. De La Torre.    CHIEF COMPLAINT ON ADMISSION  Chief Complaint   Patient presents with   • Sent by MD     pt getting testing by neurologist with new dx for epilesy.   • Abnormal Labs     low WBC 1.5 on 3/12,        Reason for Admission  Pancytopenia.  Breakthrough seizures.    Admission Date  3/12/2020    CODE STATUS  Full Code    HPI & HOSPITAL COURSE  This is a 34 y.o. female with PMHx significant for seizure disorder(temporal lobe epilepsy) was on Keppra and Zonisamide, anxiety/depression on Escitalopram, HIT. Who was admitted to the hospital for an abnormal labs ( pancytopenia) with WBCs at 1.5 K/ANC of 0.81 and frequent seizures despite being on 2 antiepileptic medications.  * Pancytopenia:  ----------------------  - initial workup which include ( Hepatitis panel,B12/Folate,HIV , Parvovirus B12 ,CRP , and iron panel) were unremarkable except for iron deficiency anaemia. Heme/Onc consulted and they recommended bone marrow biopsy which showed no evidence of malignancy ,myelodysplastic disease or abnormal cells lineage and again it confirmed the presence of iron deficiency.  Patient initially started on PO iron supplements and later switched to IV iron because of non tolerance. WBCs continued to improve during hospital stay and it trended up to 3.6 K on discharge.    *Recurret seizures:  ---------------------------  -Neurology consulted, 24 video EEG and brain MRI were obtained which showed evidence of non-convulsive seizure activity and Left hippocampal sclerosis (mesial temporal sclerosis) ,Respectively.  -Patient Keppra and Zinosamide were stopped and she was started on IV antiepileptic medications ( 1- Lacosamide  2- Perapamel  3- Brivaracetam . Which later switched to PO form.  Patient didn't experienced any episodes of  seizures while on theses medication and was sent home on 30 days supply of meds and she will follow up with neurology on April 14.     *UTI:  -------  Patient with asymptomatic bacteruria on admission. However later during hospital stay patient started developing some symptoms and was started on Augmentin PO, CT abdomen renal protocol showed no evidence of hydronephrosis or acute issues. Patient was switched to Bactrim DS after her urine culture showed growth of ampicillin resistant E.Coli.    patient was sent home on PO antiepileptic medications, PO bactrim DS ,Fluxotine and PO ferrous sulfate.          Therefore, she is discharged in good and stable condition to home with close outpatient follow-up.    The patient met 2-midnight criteria for an inpatient stay at the time of discharge.    Discharge Date  03/21/2020    FOLLOW UP ITEMS POST DISCHARGE  - Follow up with neurology.    DISCHARGE DIAGNOSES  Principal Problem:    Pancytopenia (HCC) POA: Yes  Active Problems:    Seizure disorder (HCC) POA: Yes    Cystitis POA: Yes    Depression, major, recurrent, moderate (HCC) POA: Yes    Iron defeciency anemia POA: Yes  Resolved Problems:    * No resolved hospital problems. *      FOLLOW UP  Future Appointments   Date Time Provider Department Center   4/15/2020  3:00 PM [facility] R[facility] None     No follow-up provider specified.    MEDICATIONS ON DISCHARGE     Medication List      START taking these medications      Instructions   acetaminophen 325 MG Tabs  Commonly known as:  TYLENOL   Take 2 Tabs by mouth every 6 hours as needed (Mild Pain; (Pain scale 1-3); Temp greater than 100.5 F).  Dose:  650 mg     ferrous sulfate 325 (65 Fe) MG tablet   Take 1 Tab by mouth every 48 hours for 30 days.  Dose:  325 mg     FLUoxetine 20 MG Caps  Start taking on:  March 22, 2020  Commonly known as:  PROZAC   Take 1 Cap by mouth every day for 30 days.  Dose:  20 mg     sulfamethoxazole-trimethoprim 800-160 MG  tablet  Commonly known as:  BACTRIM DS   Take 1 Tab by mouth every 12 hours for 5 days.  Dose:  1 Tab        CONTINUE taking these medications      Instructions   Cholecalciferol 125 MCG (5000 UT) Tabs   Take 5,000 Units by mouth every day.  Dose:  5,000 Units        STOP taking these medications    ibuprofen 200 MG Tabs  Commonly known as:  MOTRIN     Keppra 1000 MG tablet  Generic drug:  levetiracetam     zonisamide 100 MG Caps  Commonly known as:  ZONEGRAN            Allergies  No Known Allergies    DIET  Orders Placed This Encounter   Procedures   • Diet Order Regular     Standing Status:   Standing     Number of Occurrences:   1     Order Specific Question:   Diet:     Answer:   Regular [1]       ACTIVITY  As tolerated.  Weight bearing as tolerated    CONSULTATIONS   ( Edy Garcia and Mikey Ho) with Neurology Service consulted.  Treatment options were discussed and plan of care agreed upon.   *Dr.Nicole JEFFREY Coleman with Psychiatry service consulted. Treatment options were discussed and plan of care agreed upon.   *Dr.Aaron Jarrett with Oncology service consulted.Treatment options were discussed and plan of care agreed upon.       PROCEDURES  - Bone marrow biopsy 03/14/2020   - 24 Video EEG.

## 2020-03-21 NOTE — PSYCHIATRY
PSYCHIATRIC FOLLOW-UP:(established)  *Reason for admission:     Pancytopenia  *Legal Hold Status on Admission:    No legal hold        Supervising Psychiatrist:   Dr. Carranza           *HPI:          Patient is a 34-year-old female Kazakh speaker with a history of major depressive disorder, and seizure disorder who presented to the hospital with pancytopenia on March 12.    Patient again interviewed with  on iPad.  Patient reported significant left sided pain and shortness of breath this morning.  Episode occurred sometime after taking multiple pills and swallowing it with milk.  Pain resolved after she burped multiple times.  She denies current nausea, has mild abdominal pain, but is eating well.  She denies a history of irritable bowel syndrome denies recent constipation or diarrhea.  She continues to deny suicidal ideation, wants to live for her children, and shows good future orientation.  She continues to feel safe to go home.  She agrees to continue to take Prozac at this time but agrees to follow-up with her primary care provider.  She will continue to monitor for repeated abdominal pain and understands that she can stop Prozac at any time without the need to taper.    Medical ROS (as pertinent):                    Review of Systems   Constitutional: Negative for fever.   HENT: Negative for sore throat.    Eyes: Negative for blurred vision.   Respiratory: Negative for cough.    Cardiovascular: Negative for chest pain.   Gastrointestinal: Positive for abdominal pain. Negative for constipation, diarrhea and nausea.   Genitourinary: Negative for dysuria.   Musculoskeletal: Positive for myalgias.   Skin: Negative for rash.   Neurological: Negative for dizziness.   Endo/Heme/Allergies: Does not bruise/bleed easily.   Psychiatric/Behavioral: Positive for depression. Negative for hallucinations, substance abuse and suicidal ideas.     *Psychiatric Examination:  Vitals:    03/21/20 0800   BP: 110/57  "  Pulse: 63   Resp: 18   Temp: 37 °C (98.6 °F)   SpO2: 99%     General Appearance: Patient appears her stated age, well kempt, good hygiene, sitting in a hospital bed.  She displays good eye contact, is tearful at times during interview, and is cooperative.  Abnormal Movements: No tremor or dystonia or dyskinesia.  Gait and Posture: Normal posture.  Speech: Low volume, normal rate, normal tone.  Spontaneous speech that is nonpressured.  Thought Process: Linear and goal-directed.  Associations: No loose or clang associations.  Abnormal or Psychotic Thoughts: Denies auditory and visual hallucinations.  Denies delusions and paranoia.  Does not appear to be responding to internal stimuli.  Judgement and Insight: Fair, fair.  Orientation: Alert and oriented to person place time and event.  Recent and Remote Memory: Grossly intact  Attention Span and Concentration: Grossly intact.  Language: Fluent speaker of Wolof.  Fund of Knowledge: Adequate.  Mood and Affect: \"Pretty good.\"  Dysthymic but full range, non-irritable, non-labile.  SI/HI: Denies current suicidal ideation or plan.  Denies homicidal ideation.  MMSE score and/or clock drawing:       *ASSESSMENT:  Patient is a 34-year-old female Wolof speaker with a history of major depressive disorder, and seizure disorder who presented to the hospital with pancytopenia on March 12.    Patient continues to be dysthymic but denies SI and shows good future orientation.  Legal hold not indicated at this time.  Patient seems to be tolerating Prozac well.  Episode of abdominal pain likely unrelated to Prozac as GI symptoms related to SSRIs is usually not acute and then resolving.  However we will continue to monitor as SSRIs can exacerbate irritable bowel syndrome which can have a waxing and waning presentation.     Dx:  MDD by history  Seizure disorder     PLAN:  -Patient denies suicidal ideation, is future oriented, and does not meet criteria for legal hold.  -Continue " Prozac 20 mg daily for mood and anxiety.  -We will continue to follow.  You for the consult.  Patient seen on rounds with Dr. Carranza, attending psychiatrist, and treatment plan was discussed.

## 2020-03-21 NOTE — PROGRESS NOTES
Discharge instructions provided & reviewed w/patient. Patient instructed on where to  her prescriptions. All questions answered. Pt left w/hospital escort via wheel chair. All belongings taken.

## 2020-03-21 NOTE — CARE PLAN
Problem: Communication  Goal: The ability to communicate needs accurately and effectively will improve  Outcome: PROGRESSING AS EXPECTED     Problem: Psychosocial Needs:  Goal: Level of anxiety will decrease  Outcome: PROGRESSING AS EXPECTED

## 2020-03-21 NOTE — DISCHARGE INSTRUCTIONS
Discharge Instructions    Discharged to home by car with relative. Discharged via wheelchair, hospital escort: Yes.  Special equipment needed: Not Applicable    Be sure to schedule a follow-up appointment with your primary care doctor or any specialists as instructed.     Discharge Plan:   Diet Plan: Discussed  Activity Level: Discussed  Confirmed Follow up Appointment: Patient to Call and Schedule Appointment  Confirmed Symptoms Management: Discussed  Medication Reconciliation Updated: Yes  Influenza Vaccine Indication: Patient Refuses    I understand that a diet low in cholesterol, fat, and sodium is recommended for good health. Unless I have been given specific instructions below for another diet, I accept this instruction as my diet prescription.   Other diet: Regular     Special Instructions  #English:  - we started you on a new medications to control your seizures.Your new seizure medications are :-  1- Lacosamide ( Vimpat ).  2- Perapamel (Fycompa).  3- Brivaracetam ( Briviact).  - we stopped your old seizure medications: Keppra and Zonisamide.   - Take iron supplements (ferrous sulfate) every other day.  - Take Bactrim DS twice daily for a total of 5 days.  - Follow up with Neurology as scheduled.       #Nepali:  - Comenzamos nuevos medicamentos para controlar adrian convulsiones. Adrian nuevos medicamentos para las convulsiones son: -  1- Lacosamida (Vimpat).  2- Perapamel (Fycompa).  3- Brivaracetam (Briviact).  - Detuvimos adrian medicamentos anticonvulsivos: Keppra y Zonisamida.  - [place] suplementos de juliano (sulfato ferroso) cada dos días.  - [place] Bactrim DS dos veces al día jose un total de 5 días.  - Seguimiento con Neurología según lo programado.      · Is patient discharged on Warfarin / Coumadin?   No     Depression / Suicide Risk    As you are discharged from this [facility] facility, it is important to learn how to keep safe from harming yourself.    Recognize the warning signs:  · Abrupt changes  in personality, positive or negative- including increase in energy   · Giving away possessions  · Change in eating patterns- significant weight changes-  positive or negative  · Change in sleeping patterns- unable to sleep or sleeping all the time   · Unwillingness or inability to communicate  · Depression  · Unusual sadness, discouragement and loneliness  · Talk of wanting to die  · Neglect of personal appearance   · Rebelliousness- reckless behavior  · Withdrawal from people/activities they love  · Confusion- inability to concentrate     If you or a loved one observes any of these behaviors or has concerns about self-harm, here's what you can do:  · Talk about it- your feelings and reasons for harming yourself  · Remove any means that you might use to hurt yourself (examples: pills, rope, extension cords, firearm)  · Get professional help from the community (Mental Health, Substance Abuse, psychological counseling)  · Do not be alone:Call your Safe Contact- someone whom you trust who will be there for you.  · Call your local CRISIS HOTLINE 673-3083 or 273-589-3991  · Call your local Children's Mobile Crisis Response Team [place] (483) 286-5054 or www.Appsindep  · Call the toll free National Suicide Prevention Hotlines   · National Suicide Prevention Lifeline 937-409-BCYN (3985)  · National Hope Line Network 800-SUICIDE (906-0463)

## 2020-03-24 ENCOUNTER — TELEPHONE (OUTPATIENT)
Dept: NEUROLOGY | Facility: MEDICAL CENTER | Age: 35
End: 2020-03-24

## 2020-03-24 NOTE — TELEPHONE ENCOUNTER
Patients  stated pt has been vomiting frequently since yesterday, she is having body aches and is disoriented. He is not sure if this is normal and due to her medications. Please advise.

## 2020-03-25 ENCOUNTER — TELEPHONE (OUTPATIENT)
Dept: NEUROLOGY | Facility: MEDICAL CENTER | Age: 35
End: 2020-03-25

## 2020-03-25 NOTE — TELEPHONE ENCOUNTER
Does she also have flu-like symptoms? If this continues she may need to go to the ER to get evaluated.     Ofe translated to pt, pt stated that she has been taking pedia light and feels a little better. Per pt she will go to the ed if she feels worse.

## 2020-04-13 ENCOUNTER — TELEPHONE (OUTPATIENT)
Dept: NEUROLOGY | Facility: MEDICAL CENTER | Age: 35
End: 2020-04-13

## 2020-04-13 NOTE — TELEPHONE ENCOUNTER
Pt called this morning, she stated she has a rash on her arms and legs since last Friday. Pt will be coming in tomorrow for a f/v at 8 am. Pt confirmed.
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
## 2020-04-14 ENCOUNTER — TELEPHONE (OUTPATIENT)
Dept: NEUROLOGY | Facility: MEDICAL CENTER | Age: 35
End: 2020-04-14

## 2020-04-14 ENCOUNTER — OFFICE VISIT (OUTPATIENT)
Dept: NEUROLOGY | Facility: MEDICAL CENTER | Age: 35
End: 2020-04-14

## 2020-04-14 VITALS
SYSTOLIC BLOOD PRESSURE: 100 MMHG | WEIGHT: 108.03 LBS | DIASTOLIC BLOOD PRESSURE: 60 MMHG | OXYGEN SATURATION: 100 % | BODY MASS INDEX: 19.14 KG/M2 | HEART RATE: 67 BPM | TEMPERATURE: 98.7 F

## 2020-04-14 DIAGNOSIS — R21 RASH OF BODY: ICD-10-CM

## 2020-04-14 DIAGNOSIS — Z13.31 SCREENING FOR DEPRESSION: ICD-10-CM

## 2020-04-14 DIAGNOSIS — Z30.09 ENCOUNTER FOR FEMALE FAMILY PLANNING COUNSELING: ICD-10-CM

## 2020-04-14 DIAGNOSIS — Z00.00 HEALTHCARE MAINTENANCE: ICD-10-CM

## 2020-04-14 DIAGNOSIS — G40.109 TEMPORAL LOBE EPILEPSY (HCC): ICD-10-CM

## 2020-04-14 DIAGNOSIS — F39 MOOD DISORDER (HCC): ICD-10-CM

## 2020-04-14 DIAGNOSIS — G93.81 MESIAL TEMPORAL SCLEROSIS: ICD-10-CM

## 2020-04-14 DIAGNOSIS — G40.919 REFRACTORY EPILEPSY (HCC): ICD-10-CM

## 2020-04-14 DIAGNOSIS — Z30.09 ENCOUNTER FOR COUNSELING REGARDING CONTRACEPTION: ICD-10-CM

## 2020-04-14 DIAGNOSIS — R41.3 MEMORY LOSS: ICD-10-CM

## 2020-04-14 PROCEDURE — 99215 OFFICE O/P EST HI 40 MIN: CPT | Performed by: NURSE PRACTITIONER

## 2020-04-14 RX ORDER — ESCITALOPRAM OXALATE 10 MG/1
10 TABLET ORAL DAILY
Qty: 30 TAB | Refills: 5 | Status: SHIPPED | OUTPATIENT
Start: 2020-04-14 | End: 2020-10-19

## 2020-04-14 ASSESSMENT — FIBROSIS 4 INDEX: FIB4 SCORE: 0.99

## 2020-04-14 NOTE — TELEPHONE ENCOUNTER
MEGAN Thakur translated to pt per Aylin she would like pt to stop taking the prozac that that is the reason for pt's rash all over body. Also per Aylin she has prescribed pt lexapro and we scheduled a follow with pt to see Aylin in clinic in 4 weeks. Pt verbally understood.

## 2020-04-16 ENCOUNTER — TELEPHONE (OUTPATIENT)
Dept: NEUROLOGY | Facility: MEDICAL CENTER | Age: 35
End: 2020-04-16

## 2020-04-16 NOTE — TELEPHONE ENCOUNTER
She said she decreased the dose to 100mg BID (vimpat) because she was having dizziness with this that she even went to the ER for it. If she is taking Vimpat 200mg BID without issues, then she may continue. They can  samples next week if needed. Thanks     JH       Oh I totally misread it. I saw discharge note saying vimpat 200mg BID. Luis Enrique why shes taking 400mg BID. That's a high dose

## 2020-04-16 NOTE — TELEPHONE ENCOUNTER
NEIL  Called pt sulaiman Grubbs, spoke with her, pt wanted to let Aylin know when she came in for her appt she misunderstood the dosage when she told Aylin how she is taking her Vimpat.  When at the hospital she said she was taking Vimpat 200mg 2 po BID. She is not taking 100mg BID.  She is taking Vimpat 200mg 1 po BID and doing well

## 2020-04-28 ENCOUNTER — TELEPHONE (OUTPATIENT)
Dept: NEUROLOGY | Facility: MEDICAL CENTER | Age: 35
End: 2020-04-28

## 2020-04-28 NOTE — TELEPHONE ENCOUNTER
Pt stated she was prescribed fycompa 2 mg qhs by chris but after she started taking the medication she was experiencing allergies and her and her  decided to cut the 2mg tab in half and start take the 1mg to see if her allergies would go away. Per pt its been a week on the 1 mg and her allergies are gone and no sz. Chris verbally is ok with pt just taking 1 mg fycompa.
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
## 2020-05-12 ENCOUNTER — OFFICE VISIT (OUTPATIENT)
Dept: NEUROLOGY | Facility: MEDICAL CENTER | Age: 35
End: 2020-05-12

## 2020-05-12 VITALS
SYSTOLIC BLOOD PRESSURE: 102 MMHG | RESPIRATION RATE: 14 BRPM | OXYGEN SATURATION: 98 % | WEIGHT: 108.47 LBS | DIASTOLIC BLOOD PRESSURE: 48 MMHG | TEMPERATURE: 96.6 F | BODY MASS INDEX: 19.21 KG/M2 | HEART RATE: 64 BPM

## 2020-05-12 DIAGNOSIS — G40.919 REFRACTORY EPILEPSY (HCC): ICD-10-CM

## 2020-05-12 DIAGNOSIS — Z30.09 ENCOUNTER FOR FEMALE FAMILY PLANNING COUNSELING: ICD-10-CM

## 2020-05-12 DIAGNOSIS — G93.81 MESIAL TEMPORAL SCLEROSIS: ICD-10-CM

## 2020-05-12 DIAGNOSIS — R41.3 MEMORY LOSS: ICD-10-CM

## 2020-05-12 DIAGNOSIS — F39 MOOD DISORDER (HCC): ICD-10-CM

## 2020-05-12 DIAGNOSIS — G40.109 TEMPORAL LOBE EPILEPSY (HCC): ICD-10-CM

## 2020-05-12 DIAGNOSIS — Z30.09 ENCOUNTER FOR COUNSELING REGARDING CONTRACEPTION: ICD-10-CM

## 2020-05-12 DIAGNOSIS — Z13.31 SCREENING FOR DEPRESSION: ICD-10-CM

## 2020-05-12 PROCEDURE — 99214 OFFICE O/P EST MOD 30 MIN: CPT | Performed by: NURSE PRACTITIONER

## 2020-05-12 RX ORDER — FOLIC ACID 1 MG/1
2 TABLET ORAL DAILY
Qty: 60 TAB | Refills: 11 | Status: SHIPPED | OUTPATIENT
Start: 2020-05-12 | End: 2021-06-08 | Stop reason: SDUPTHER

## 2020-05-12 ASSESSMENT — FIBROSIS 4 INDEX: FIB4 SCORE: 1.02

## 2020-05-19 DIAGNOSIS — G40.919 REFRACTORY EPILEPSY (HCC): ICD-10-CM

## 2020-05-19 RX ORDER — LACOSAMIDE 100 MG/1
100 TABLET ORAL 2 TIMES DAILY
Qty: 180 TAB | Refills: 3 | Status: SHIPPED | OUTPATIENT
Start: 2020-05-19 | End: 2020-05-29 | Stop reason: SDUPTHER

## 2020-05-28 ENCOUNTER — TELEPHONE (OUTPATIENT)
Dept: NEUROLOGY | Facility: MEDICAL CENTER | Age: 35
End: 2020-05-28

## 2020-05-28 NOTE — TELEPHONE ENCOUNTER
Pt called regarding her medication she seemed confused about her Vimpat. She states she was going to be out of it, she asked if we had sent the order to [facility] pt assistance, advise pt to call to have them send her the meds if she qualified if not to let us know to give her samples so she doesn't stop taking this med. Pt agreed, and will let us know.

## 2020-05-29 ENCOUNTER — TELEPHONE (OUTPATIENT)
Dept: NEUROLOGY | Facility: MEDICAL CENTER | Age: 35
End: 2020-05-29

## 2020-05-29 DIAGNOSIS — G40.919 REFRACTORY EPILEPSY (HCC): ICD-10-CM

## 2020-05-29 RX ORDER — LACOSAMIDE 200 MG/1
200 TABLET ORAL 2 TIMES DAILY
Qty: 180 TAB | Refills: 3 | Status: SHIPPED | OUTPATIENT
Start: 2020-05-29 | End: 2020-12-04 | Stop reason: SDUPTHER

## 2020-05-29 NOTE — TELEPHONE ENCOUNTER
----- Message from Humera Badillo, Med Ass't sent at 5/29/2020  9:57 AM PDT -----  Regarding: medication  Hello,    Would you be able to write a script for pt's vimpat 200 mg po bid? Aylin has already prescribed this medication to pt but pt has applied with the [facility] patient assistance program for the vimpat and was approved only issue is that they will not take a verbal and are requesting a paper script to be faxed also they want it from an MD.    Humera

## 2020-06-01 ENCOUNTER — TELEPHONE (OUTPATIENT)
Dept: NEUROLOGY | Facility: MEDICAL CENTER | Age: 35
End: 2020-06-01

## 2020-06-01 NOTE — TELEPHONE ENCOUNTER
Ofe called pt to translate in Beninese.  Patient stated she has a few pills left of the vimpat but that she spoke with the [facility] pt assistance program and was told the vimpat will delivered to her home tomorrow on 06/02 but that it will be the script that Dr Munoz wrote which was not correct but will still deliver so pt is not out of the medication. I will have Dr Oconnor sign the correct vimpat script of 200 mg po bid tomorrow when he is back in the office so I can fax and pt's next refill will be dispensed correctly. Ofe did notify pt that id she is about to run out of the vimpat and has not received a refill from [facility] in the future to come to our office to  samples. Pt verbally understood all information given.

## 2020-06-18 DIAGNOSIS — G40.909 SEIZURE DISORDER (HCC): ICD-10-CM

## 2020-06-18 NOTE — TELEPHONE ENCOUNTER
Received request via: Patient    Was the patient seen in the last year in this department? Yes    Does the patient have an active prescription (recently filled or refills available) for medication(s) requested? yes       Can you please print a script out this pt's Briviact. [facility] pharmacy requires a paper script faxed and must be an MD. Thank you.

## 2020-06-25 DIAGNOSIS — G40.909 SEIZURE DISORDER (HCC): ICD-10-CM

## 2020-06-25 DIAGNOSIS — G40.919 REFRACTORY EPILEPSY (HCC): ICD-10-CM

## 2020-06-29 ENCOUNTER — TELEPHONE (OUTPATIENT)
Dept: NEUROLOGY | Facility: MEDICAL CENTER | Age: 35
End: 2020-06-29

## 2020-06-29 DIAGNOSIS — G40.919 REFRACTORY EPILEPSY (HCC): ICD-10-CM

## 2020-06-29 NOTE — TELEPHONE ENCOUNTER
Returning pt call she states she was confirming that she could go to the pharmacy and give the records/form Aylin told pt to show to get the Fycompa.  Asked pt if she had the form or record Aylin told her to show to get Rx, she states she did. Advised to go to the pharmacy and get the medication the way she was advised and if she needs discount she can try the ALCOHOOT kalie, she states she has it and will try it, will let us know which pharmacy to send Rx to, if needed.

## 2020-07-15 ENCOUNTER — TELEPHONE (OUTPATIENT)
Dept: NEUROLOGY | Facility: MEDICAL CENTER | Age: 35
End: 2020-07-15

## 2020-07-15 NOTE — TELEPHONE ENCOUNTER
perampanel (FYCOMPA) 2 MG Tab tablet  15 Tab  5/5 6/29/2020 12/26/2020     Sig - Route: Take 0.5 Tabs by mouth every bedtime for 180 days. - Oral       I spoke with pharmacist Cortes and gave verbal to refill medication. Pt was notified.
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
## 2020-08-31 ENCOUNTER — OFFICE VISIT (OUTPATIENT)
Dept: NEUROLOGY | Facility: MEDICAL CENTER | Age: 35
End: 2020-08-31

## 2020-08-31 VITALS
WEIGHT: 112.66 LBS | RESPIRATION RATE: 14 BRPM | SYSTOLIC BLOOD PRESSURE: 116 MMHG | OXYGEN SATURATION: 100 % | TEMPERATURE: 96.9 F | HEART RATE: 52 BPM | DIASTOLIC BLOOD PRESSURE: 68 MMHG | BODY MASS INDEX: 19.96 KG/M2

## 2020-08-31 DIAGNOSIS — R41.3 MEMORY LOSS: ICD-10-CM

## 2020-08-31 DIAGNOSIS — G40.919 PHARMACORESISTANT INTRACTABLE EPILEPSY (HCC): ICD-10-CM

## 2020-08-31 DIAGNOSIS — G93.81 MESIAL TEMPORAL SCLEROSIS: ICD-10-CM

## 2020-08-31 DIAGNOSIS — Z13.31 SCREENING FOR DEPRESSION: ICD-10-CM

## 2020-08-31 DIAGNOSIS — Z30.09 ENCOUNTER FOR COUNSELING REGARDING CONTRACEPTION: ICD-10-CM

## 2020-08-31 DIAGNOSIS — G40.909 SEIZURE DISORDER (HCC): ICD-10-CM

## 2020-08-31 DIAGNOSIS — Z30.09 ENCOUNTER FOR FEMALE FAMILY PLANNING COUNSELING: ICD-10-CM

## 2020-08-31 DIAGNOSIS — F39 MOOD DISORDER (HCC): ICD-10-CM

## 2020-08-31 PROCEDURE — 99214 OFFICE O/P EST MOD 30 MIN: CPT | Performed by: NURSE PRACTITIONER

## 2020-08-31 RX ORDER — BRIVARACETAM 75 MG/1
150 TABLET, FILM COATED ORAL 2 TIMES DAILY
Qty: 360 TAB | Refills: 3 | Status: SHIPPED | OUTPATIENT
Start: 2020-08-31 | End: 2020-12-04 | Stop reason: SDUPTHER

## 2020-08-31 RX ORDER — BRIVARACETAM 75 MG/1
150 TABLET, FILM COATED ORAL 2 TIMES DAILY
Qty: 120 TAB | Refills: 5 | Status: SHIPPED | OUTPATIENT
Start: 2020-08-31 | End: 2020-08-31 | Stop reason: SDUPTHER

## 2020-08-31 ASSESSMENT — FIBROSIS 4 INDEX: FIB4 SCORE: 1.02

## 2020-10-13 ENCOUNTER — TELEPHONE (OUTPATIENT)
Dept: NEUROLOGY | Facility: MEDICAL CENTER | Age: 35
End: 2020-10-13

## 2020-10-19 RX ORDER — ESCITALOPRAM OXALATE 10 MG/1
TABLET ORAL
Qty: 30 TAB | Refills: 5 | Status: SHIPPED | OUTPATIENT
Start: 2020-10-19 | End: 2020-12-04 | Stop reason: SDUPTHER
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
## 2020-11-30 ENCOUNTER — APPOINTMENT (OUTPATIENT)
Dept: NEUROLOGY | Facility: MEDICAL CENTER | Age: 35
End: 2020-11-30
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
## 2020-12-04 ENCOUNTER — OFFICE VISIT (OUTPATIENT)
Dept: NEUROLOGY | Facility: MEDICAL CENTER | Age: 35
End: 2020-12-04

## 2020-12-04 VITALS
BODY MASS INDEX: 19.57 KG/M2 | HEART RATE: 57 BPM | HEIGHT: 64 IN | TEMPERATURE: 97.8 F | OXYGEN SATURATION: 100 % | WEIGHT: 114.64 LBS | SYSTOLIC BLOOD PRESSURE: 100 MMHG | DIASTOLIC BLOOD PRESSURE: 62 MMHG

## 2020-12-04 DIAGNOSIS — G40.919 PHARMACORESISTANT INTRACTABLE EPILEPSY (HCC): ICD-10-CM

## 2020-12-04 DIAGNOSIS — F32.A DEPRESSION, UNSPECIFIED DEPRESSION TYPE: ICD-10-CM

## 2020-12-04 DIAGNOSIS — Z00.00 HEALTHCARE MAINTENANCE: ICD-10-CM

## 2020-12-04 DIAGNOSIS — Z13.31 SCREENING FOR DEPRESSION: ICD-10-CM

## 2020-12-04 DIAGNOSIS — Z30.09 ENCOUNTER FOR COUNSELING REGARDING CONTRACEPTION: ICD-10-CM

## 2020-12-04 DIAGNOSIS — E55.9 VITAMIN D DEFICIENCY: ICD-10-CM

## 2020-12-04 DIAGNOSIS — G93.81 MESIAL TEMPORAL SCLEROSIS: ICD-10-CM

## 2020-12-04 DIAGNOSIS — Z30.09 ENCOUNTER FOR FEMALE FAMILY PLANNING COUNSELING: ICD-10-CM

## 2020-12-04 PROCEDURE — 99215 OFFICE O/P EST HI 40 MIN: CPT | Performed by: NURSE PRACTITIONER

## 2020-12-04 RX ORDER — LACOSAMIDE 200 MG/1
200 TABLET ORAL 2 TIMES DAILY
Qty: 60 TAB | Refills: 5 | Status: SHIPPED | OUTPATIENT
Start: 2020-12-04 | End: 2021-06-08 | Stop reason: SDUPTHER

## 2020-12-04 RX ORDER — LACOSAMIDE 200 MG/1
200 TABLET ORAL 2 TIMES DAILY
Qty: 180 TAB | Refills: 3 | Status: SHIPPED | OUTPATIENT
Start: 2020-12-04 | End: 2020-12-04 | Stop reason: SDUPTHER

## 2020-12-04 RX ORDER — BRIVARACETAM 75 MG/1
150 TABLET, FILM COATED ORAL 2 TIMES DAILY
Qty: 120 TAB | Refills: 5 | Status: SHIPPED | OUTPATIENT
Start: 2020-12-04 | End: 2020-12-04 | Stop reason: SDUPTHER

## 2020-12-04 RX ORDER — BRIVARACETAM 75 MG/1
150 TABLET, FILM COATED ORAL 2 TIMES DAILY
Qty: 120 TAB | Refills: 5 | Status: SHIPPED | OUTPATIENT
Start: 2020-12-04 | End: 2021-06-08 | Stop reason: SDUPTHER

## 2020-12-04 RX ORDER — LACOSAMIDE 200 MG/1
200 TABLET ORAL 2 TIMES DAILY
Qty: 60 TAB | Refills: 5 | Status: SHIPPED | OUTPATIENT
Start: 2020-12-04 | End: 2020-12-04 | Stop reason: SDUPTHER

## 2020-12-04 RX ORDER — ESCITALOPRAM OXALATE 10 MG/1
10 TABLET ORAL
Qty: 30 TAB | Refills: 5 | Status: SHIPPED | OUTPATIENT
Start: 2020-12-04 | End: 2021-04-07 | Stop reason: SDUPTHER

## 2020-12-04 ASSESSMENT — FIBROSIS 4 INDEX: FIB4 SCORE: 1.02

## 2020-12-04 NOTE — PATIENT INSTRUCTIONS
-Referral to PCP. Discuss birth control options and manage and monitor anemia    -blood work to be done 1-2 weeks before next appointment. Before taking vimpat in am    - cotinue Vit D and folic daily

## 2020-12-08 ENCOUNTER — TELEPHONE (OUTPATIENT)
Dept: SCHEDULING | Facility: IMAGING CENTER | Age: 35
End: 2020-12-08

## 2020-12-16 ENCOUNTER — OFFICE VISIT (OUTPATIENT)
Dept: MEDICAL GROUP | Facility: PHYSICIAN GROUP | Age: 35
End: 2020-12-16

## 2020-12-16 VITALS
DIASTOLIC BLOOD PRESSURE: 60 MMHG | TEMPERATURE: 98.1 F | HEART RATE: 61 BPM | WEIGHT: 117.8 LBS | RESPIRATION RATE: 16 BRPM | SYSTOLIC BLOOD PRESSURE: 102 MMHG | BODY MASS INDEX: 20.11 KG/M2 | OXYGEN SATURATION: 98 % | HEIGHT: 64 IN

## 2020-12-16 DIAGNOSIS — G40.909 SEIZURE DISORDER (HCC): ICD-10-CM

## 2020-12-16 DIAGNOSIS — D50.0 IRON DEFICIENCY ANEMIA DUE TO CHRONIC BLOOD LOSS: ICD-10-CM

## 2020-12-16 DIAGNOSIS — F33.1 DEPRESSION, MAJOR, RECURRENT, MODERATE (HCC): ICD-10-CM

## 2020-12-16 PROBLEM — F41.9 ANXIETY: Status: RESOLVED | Noted: 2019-01-19 | Resolved: 2020-12-16

## 2020-12-16 PROBLEM — D61.818 PANCYTOPENIA (HCC): Status: RESOLVED | Noted: 2020-03-13 | Resolved: 2020-12-16

## 2020-12-16 PROBLEM — N30.90 CYSTITIS: Status: RESOLVED | Noted: 2020-03-13 | Resolved: 2020-12-16

## 2020-12-16 PROCEDURE — 99204 OFFICE O/P NEW MOD 45 MIN: CPT | Performed by: FAMILY MEDICINE

## 2020-12-16 ASSESSMENT — ENCOUNTER SYMPTOMS
FEVER: 0
CONSTITUTIONAL NEGATIVE: 1
GASTROINTESTINAL NEGATIVE: 1
COUGH: 0
EYES NEGATIVE: 1
DIZZINESS: 0
PALPITATIONS: 0
NAUSEA: 0
HEADACHES: 0
MUSCULOSKELETAL NEGATIVE: 1
BRUISES/BLEEDS EASILY: 0
DOUBLE VISION: 0
PSYCHIATRIC NEGATIVE: 1
CARDIOVASCULAR NEGATIVE: 1
MYALGIAS: 0
RESPIRATORY NEGATIVE: 1
BLURRED VISION: 0
SEIZURES: 1
HEMOPTYSIS: 0
CHILLS: 0
TINGLING: 0
DEPRESSION: 0
HEARTBURN: 0

## 2020-12-16 ASSESSMENT — FIBROSIS 4 INDEX: FIB4 SCORE: 1.02

## 2020-12-17 NOTE — PROGRESS NOTES
Subjective:      Jennifer Lopez is a 35 y.o. female who presents with Establish Care and Seizure            New patient visit. She is seen by neurology for her pharmacoresistant sz disorder. On many medications and still with some sx  She is a  and has been advised by neurology that her meds are potentially teratogenic  She and her  have been using condoms only  She also has a history of iron deficient anemia due to heavy menses  Due to all of these concerns discussed with her today the need to take ocp  She agrees to do so and will f/u f/u in 4 weeks for efficacy  Will get previsit labs for eval    1. Seizure disorder (HCC)    - Comp Metabolic Panel; Future  - CBC WITH DIFFERENTIAL; Future  - norethindrone-ethinyl estradiol (NORINYL 1+35, 28,) 1-35 MG-MCG per tablet; Take 1 Tab by mouth every day.  Dispense: 28 Tab; Refill: 11    2. Iron deficiency anemia due to chronic blood loss  Start ocp  - Comp Metabolic Panel; Future  - CBC WITH DIFFERENTIAL; Future    3. Depression, major, recurrent, moderate (HCC)  The patient's current medical issue is well controlled on the current therapy with no new symptoms or worsening    - Comp Metabolic Panel; Future  - CBC WITH DIFFERENTIAL; Future    Past Medical History:  2013: Anemia  2019: Anxiety  2018: Depression, major, recurrent, moderate (HCC)  No date: Epilepsy associated with specific stimuli (HCC)  No date: Head ache  No date: Seizure (HCC)  No past surgical history on file.  Social History    Tobacco Use      Smoking status: Never Smoker      Smokeless tobacco: Never Used    Alcohol use: No    Drug use: No    Review of patient's family history indicates:  Problem: Hypertension      Relation: Father          Age of Onset: (Not Specified)      Current Outpatient Medications: •  norethindrone-ethinyl estradiol (NORINYL 1+35, 28,) 1-35 MG-MCG per tablet, Take 1 Tab by mouth every day., Disp: 28 Tab, Rfl: 11•  perampanel (FYCOMPA) 2 MG  "Tab tablet, Take 0.5 Tabs by mouth every bedtime for 180 days., Disp: 15 Tab, Rfl: 5•  escitalopram (LEXAPRO) 10 MG Tab, Take 1 Tab by mouth every day., Disp: 30 Tab, Rfl: 5•  lacosamide (VIMPAT) 200 MG Tab tablet, Take 200 mg by mouth 2 Times a Day for 182 days., Disp: 60 Tab, Rfl: 5•  Brivaracetam (BRIVIACT) 75 MG Tab, Take 150 mg by mouth 2 Times a Day for 180 days., Disp: 120 Tab, Rfl: 5•  folic acid (FOLVITE) 1 MG Tab, Take 2 Tabs by mouth every day., Disp: 60 Tab, Rfl: 11•  acetaminophen (TYLENOL) 325 MG Tab, Take 2 Tabs by mouth every 6 hours as needed (Mild Pain; (Pain scale 1-3); Temp greater than 100.5 F)., Disp: 30 Tab, Rfl: 0    Patient was instructed on the use of medications, either prescriptions or OTC and informed on when the appropriate follow up time period should be. In addition, patient was also instructed that should any acute worsening occur that they should notify this clinic asap or call 911.           Review of Systems   Constitutional: Negative.  Negative for chills and fever.   HENT: Negative.  Negative for hearing loss.    Eyes: Negative.  Negative for blurred vision and double vision.   Respiratory: Negative.  Negative for cough and hemoptysis.    Cardiovascular: Negative.  Negative for chest pain and palpitations.   Gastrointestinal: Negative.  Negative for heartburn and nausea.   Genitourinary: Negative.  Negative for dysuria.   Musculoskeletal: Negative.  Negative for myalgias.   Skin: Negative.  Negative for rash.   Neurological: Positive for seizures. Negative for dizziness, tingling and headaches.   Endo/Heme/Allergies: Negative.  Does not bruise/bleed easily.   Psychiatric/Behavioral: Negative.  Negative for depression and suicidal ideas.   All other systems reviewed and are negative.         Objective:     /60   Pulse 61   Temp 36.7 °C (98.1 °F) (Temporal)   Resp 16   Ht 1.626 m (5' 4\")   Wt 53.4 kg (117 lb 12.8 oz)   SpO2 98%   BMI 20.22 kg/m²      Physical " Exam  Vitals signs and nursing note reviewed.   Constitutional:       General: She is not in acute distress.     Appearance: She is well-developed. She is not diaphoretic.   HENT:      Head: Normocephalic and atraumatic.      Mouth/Throat:      Pharynx: No oropharyngeal exudate.   Eyes:      Pupils: Pupils are equal, round, and reactive to light.   Cardiovascular:      Rate and Rhythm: Normal rate and regular rhythm.      Heart sounds: Normal heart sounds. No murmur. No friction rub. No gallop.    Pulmonary:      Effort: Pulmonary effort is normal. No respiratory distress.      Breath sounds: Normal breath sounds. No wheezing or rales.   Chest:      Chest wall: No tenderness.   Neurological:      Mental Status: She is alert and oriented to person, place, and time.   Psychiatric:         Behavior: Behavior normal.         Thought Content: Thought content normal.         Judgment: Judgment normal.                 Assessment/Plan:        1. Seizure disorder (HCC)    - Comp Metabolic Panel; Future  - CBC WITH DIFFERENTIAL; Future  - norethindrone-ethinyl estradiol (NORINYL 1+35, 28,) 1-35 MG-MCG per tablet; Take 1 Tab by mouth every day.  Dispense: 28 Tab; Refill: 11    2. Iron deficiency anemia due to chronic blood loss    - Comp Metabolic Panel; Future  - CBC WITH DIFFERENTIAL; Future    3. Depression, major, recurrent, moderate (HCC)    - Comp Metabolic Panel; Future  - CBC WITH DIFFERENTIAL; Future

## 2021-03-22 ENCOUNTER — OFFICE VISIT (OUTPATIENT)
Dept: MEDICAL GROUP | Facility: PHYSICIAN GROUP | Age: 36
End: 2021-03-22

## 2021-03-22 VITALS
DIASTOLIC BLOOD PRESSURE: 68 MMHG | WEIGHT: 118.1 LBS | HEIGHT: 64 IN | OXYGEN SATURATION: 96 % | HEART RATE: 70 BPM | RESPIRATION RATE: 20 BRPM | BODY MASS INDEX: 20.16 KG/M2 | SYSTOLIC BLOOD PRESSURE: 122 MMHG | TEMPERATURE: 98.5 F

## 2021-03-22 DIAGNOSIS — G40.909 SEIZURE DISORDER (HCC): ICD-10-CM

## 2021-03-22 DIAGNOSIS — F33.1 DEPRESSION, MAJOR, RECURRENT, MODERATE (HCC): ICD-10-CM

## 2021-03-22 DIAGNOSIS — Z23 NEED FOR VACCINATION: ICD-10-CM

## 2021-03-22 PROCEDURE — 90686 IIV4 VACC NO PRSV 0.5 ML IM: CPT | Performed by: FAMILY MEDICINE

## 2021-03-22 PROCEDURE — 99213 OFFICE O/P EST LOW 20 MIN: CPT | Mod: 25 | Performed by: FAMILY MEDICINE

## 2021-03-22 PROCEDURE — 90471 IMMUNIZATION ADMIN: CPT | Performed by: FAMILY MEDICINE

## 2021-03-22 ASSESSMENT — FIBROSIS 4 INDEX: FIB4 SCORE: 0.99

## 2021-03-22 ASSESSMENT — ENCOUNTER SYMPTOMS
BLURRED VISION: 0
MUSCULOSKELETAL NEGATIVE: 1
DIZZINESS: 0
BRUISES/BLEEDS EASILY: 0
FEVER: 0
HEMOPTYSIS: 0
CHILLS: 0
NEUROLOGICAL NEGATIVE: 1
GASTROINTESTINAL NEGATIVE: 1
TINGLING: 0
RESPIRATORY NEGATIVE: 1
PSYCHIATRIC NEGATIVE: 1
DEPRESSION: 0
PALPITATIONS: 0
EYES NEGATIVE: 1
MYALGIAS: 0
HEADACHES: 0
CONSTITUTIONAL NEGATIVE: 1
COUGH: 0
DOUBLE VISION: 0
NAUSEA: 0
HEARTBURN: 0
CARDIOVASCULAR NEGATIVE: 1

## 2021-04-07 DIAGNOSIS — F32.A DEPRESSION, UNSPECIFIED DEPRESSION TYPE: ICD-10-CM

## 2021-04-08 RX ORDER — ESCITALOPRAM OXALATE 10 MG/1
10 TABLET ORAL
Qty: 30 TABLET | Refills: 5 | Status: SHIPPED | OUTPATIENT
Start: 2021-04-08 | End: 2021-07-15 | Stop reason: SDUPTHER

## 2021-06-08 ENCOUNTER — TELEPHONE (OUTPATIENT)
Dept: NEUROLOGY | Facility: MEDICAL CENTER | Age: 36
End: 2021-06-08

## 2021-06-08 DIAGNOSIS — Z30.09 ENCOUNTER FOR FEMALE FAMILY PLANNING COUNSELING: ICD-10-CM

## 2021-06-08 RX ORDER — FOLIC ACID 1 MG/1
2 TABLET ORAL DAILY
Qty: 60 TABLET | Refills: 11 | Status: SHIPPED | OUTPATIENT
Start: 2021-06-08 | End: 2022-06-14

## 2021-06-08 NOTE — TELEPHONE ENCOUNTER
Pt came in to see Aylin when checked in up front she had a seizure. She was roomed.   We called Butch (nephew) [phone] number given to us by pt to pick her up, advised Butch to come to the  to  pt. Her verbally understood.
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
## 2021-07-15 ENCOUNTER — OFFICE VISIT (OUTPATIENT)
Dept: NEUROLOGY | Facility: MEDICAL CENTER | Age: 36
End: 2021-07-15
Attending: NURSE PRACTITIONER

## 2021-07-15 VITALS
HEART RATE: 62 BPM | WEIGHT: 119 LBS | TEMPERATURE: 95.7 F | SYSTOLIC BLOOD PRESSURE: 106 MMHG | BODY MASS INDEX: 20.43 KG/M2 | RESPIRATION RATE: 12 BRPM | OXYGEN SATURATION: 96 % | DIASTOLIC BLOOD PRESSURE: 68 MMHG

## 2021-07-15 DIAGNOSIS — G40.919 PHARMACORESISTANT INTRACTABLE EPILEPSY (HCC): ICD-10-CM

## 2021-07-15 DIAGNOSIS — Z13.31 SCREENING FOR DEPRESSION: ICD-10-CM

## 2021-07-15 DIAGNOSIS — G93.81 MESIAL TEMPORAL SCLEROSIS: ICD-10-CM

## 2021-07-15 DIAGNOSIS — F32.A DEPRESSION, UNSPECIFIED DEPRESSION TYPE: ICD-10-CM

## 2021-07-15 DIAGNOSIS — F39 MOOD DISORDER (HCC): ICD-10-CM

## 2021-07-15 PROCEDURE — 99215 OFFICE O/P EST HI 40 MIN: CPT | Performed by: NURSE PRACTITIONER

## 2021-07-15 PROCEDURE — 99212 OFFICE O/P EST SF 10 MIN: CPT | Performed by: NURSE PRACTITIONER

## 2021-07-15 RX ORDER — LACOSAMIDE 200 MG/1
200 TABLET ORAL 2 TIMES DAILY
Qty: 180 TABLET | Refills: 3 | Status: SHIPPED | OUTPATIENT
Start: 2021-07-15 | End: 2021-12-29

## 2021-07-15 RX ORDER — ESCITALOPRAM OXALATE 10 MG/1
10 TABLET ORAL
Qty: 30 TABLET | Refills: 5 | Status: SHIPPED | OUTPATIENT
Start: 2021-07-15 | End: 2022-01-31

## 2021-07-15 RX ORDER — BRIVARACETAM 75 MG/1
150 TABLET, FILM COATED ORAL 2 TIMES DAILY
Qty: 460 TABLET | Refills: 3 | Status: SHIPPED | OUTPATIENT
Start: 2021-07-15 | End: 2021-12-29

## 2021-07-15 ASSESSMENT — FIBROSIS 4 INDEX: FIB4 SCORE: 0.92

## 2021-07-15 NOTE — TELEPHONE ENCOUNTER
Received request via: Pharmacy    Was the patient seen in the last year in this department? Yes    Does the patient have an active prescription (recently filled or refills available) for medication(s) requested? yes    PLEASE SEND IN AS A 90 DAY SUPPLY. THANK YOU.    ELIZABETH
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
## 2021-08-19 ENCOUNTER — OFFICE VISIT (OUTPATIENT)
Dept: NEUROLOGY | Facility: MEDICAL CENTER | Age: 36
End: 2021-08-19
Attending: NURSE PRACTITIONER

## 2021-08-19 VITALS
DIASTOLIC BLOOD PRESSURE: 68 MMHG | BODY MASS INDEX: 20.43 KG/M2 | WEIGHT: 119 LBS | TEMPERATURE: 97.1 F | RESPIRATION RATE: 12 BRPM | SYSTOLIC BLOOD PRESSURE: 104 MMHG | HEART RATE: 68 BPM | OXYGEN SATURATION: 98 %

## 2021-08-19 DIAGNOSIS — G40.919 PHARMACORESISTANT INTRACTABLE EPILEPSY (HCC): ICD-10-CM

## 2021-08-19 DIAGNOSIS — F39 MOOD DISORDER (HCC): ICD-10-CM

## 2021-08-19 DIAGNOSIS — G93.81 MESIAL TEMPORAL SCLEROSIS: ICD-10-CM

## 2021-08-19 DIAGNOSIS — Z13.31 SCREENING FOR DEPRESSION: ICD-10-CM

## 2021-08-19 DIAGNOSIS — F32.A DEPRESSION, UNSPECIFIED DEPRESSION TYPE: ICD-10-CM

## 2021-08-19 PROCEDURE — 99211 OFF/OP EST MAY X REQ PHY/QHP: CPT | Performed by: NURSE PRACTITIONER

## 2021-08-19 PROCEDURE — 99214 OFFICE O/P EST MOD 30 MIN: CPT | Performed by: NURSE PRACTITIONER

## 2021-08-19 ASSESSMENT — FIBROSIS 4 INDEX: FIB4 SCORE: 0.83
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
## 2021-10-11 ENCOUNTER — OFFICE VISIT (OUTPATIENT)
Dept: NEUROLOGY | Facility: MEDICAL CENTER | Age: 36
End: 2021-10-11
Attending: NURSE PRACTITIONER

## 2021-10-11 ENCOUNTER — OFFICE VISIT (OUTPATIENT)
Dept: MEDICAL GROUP | Facility: PHYSICIAN GROUP | Age: 36
End: 2021-10-11

## 2021-10-11 VITALS
HEART RATE: 59 BPM | OXYGEN SATURATION: 99 % | WEIGHT: 121.4 LBS | HEIGHT: 66 IN | RESPIRATION RATE: 16 BRPM | BODY MASS INDEX: 19.51 KG/M2 | DIASTOLIC BLOOD PRESSURE: 62 MMHG | SYSTOLIC BLOOD PRESSURE: 112 MMHG | TEMPERATURE: 97.5 F

## 2021-10-11 VITALS
TEMPERATURE: 96.7 F | HEART RATE: 60 BPM | WEIGHT: 120 LBS | DIASTOLIC BLOOD PRESSURE: 62 MMHG | BODY MASS INDEX: 20.6 KG/M2 | OXYGEN SATURATION: 96 % | SYSTOLIC BLOOD PRESSURE: 108 MMHG | RESPIRATION RATE: 12 BRPM

## 2021-10-11 DIAGNOSIS — G40.919 PHARMACORESISTANT INTRACTABLE EPILEPSY (HCC): ICD-10-CM

## 2021-10-11 DIAGNOSIS — G40.909 SEIZURE DISORDER (HCC): ICD-10-CM

## 2021-10-11 DIAGNOSIS — F39 MOOD DISORDER (HCC): ICD-10-CM

## 2021-10-11 DIAGNOSIS — Z30.09 ENCOUNTER FOR FEMALE FAMILY PLANNING COUNSELING: ICD-10-CM

## 2021-10-11 DIAGNOSIS — Z30.09 ENCOUNTER FOR COUNSELING REGARDING CONTRACEPTION: ICD-10-CM

## 2021-10-11 DIAGNOSIS — G93.81 MESIAL TEMPORAL SCLEROSIS: ICD-10-CM

## 2021-10-11 DIAGNOSIS — Z00.00 WELL ADULT EXAM: ICD-10-CM

## 2021-10-11 DIAGNOSIS — Z13.31 SCREENING FOR DEPRESSION: ICD-10-CM

## 2021-10-11 DIAGNOSIS — F33.1 DEPRESSION, MAJOR, RECURRENT, MODERATE (HCC): ICD-10-CM

## 2021-10-11 PROCEDURE — 99211 OFF/OP EST MAY X REQ PHY/QHP: CPT | Performed by: NURSE PRACTITIONER

## 2021-10-11 PROCEDURE — 99215 OFFICE O/P EST HI 40 MIN: CPT | Performed by: NURSE PRACTITIONER

## 2021-10-11 PROCEDURE — 99395 PREV VISIT EST AGE 18-39: CPT | Performed by: FAMILY MEDICINE

## 2021-10-11 ASSESSMENT — ENCOUNTER SYMPTOMS
COUGH: 0
BRUISES/BLEEDS EASILY: 0
DOUBLE VISION: 0
PALPITATIONS: 0
GASTROINTESTINAL NEGATIVE: 1
MYALGIAS: 0
CHILLS: 0
PSYCHIATRIC NEGATIVE: 1
CARDIOVASCULAR NEGATIVE: 1
NAUSEA: 0
HEMOPTYSIS: 0
NEUROLOGICAL NEGATIVE: 1
FEVER: 0
EYES NEGATIVE: 1
HEARTBURN: 0
TINGLING: 0
DIZZINESS: 0
RESPIRATORY NEGATIVE: 1
MUSCULOSKELETAL NEGATIVE: 1
BLURRED VISION: 0
DEPRESSION: 0
CONSTITUTIONAL NEGATIVE: 1
HEADACHES: 0

## 2021-10-11 ASSESSMENT — PATIENT HEALTH QUESTIONNAIRE - PHQ9
6. FEELING BAD ABOUT YOURSELF - OR THAT YOU ARE A FAILURE OR HAVE LET YOURSELF OR YOUR FAMILY DOWN: NOT AL ALL
3. TROUBLE FALLING OR STAYING ASLEEP OR SLEEPING TOO MUCH: NOT AT ALL
SUM OF ALL RESPONSES TO PHQ9 QUESTIONS 1 AND 2: 1
5. POOR APPETITE OR OVEREATING: SEVERAL DAYS
8. MOVING OR SPEAKING SO SLOWLY THAT OTHER PEOPLE COULD HAVE NOTICED. OR THE OPPOSITE, BEING SO FIGETY OR RESTLESS THAT YOU HAVE BEEN MOVING AROUND A LOT MORE THAN USUAL: NOT AT ALL
SUM OF ALL RESPONSES TO PHQ QUESTIONS 1-9: 4
1. LITTLE INTEREST OR PLEASURE IN DOING THINGS: NOT AT ALL
7. TROUBLE CONCENTRATING ON THINGS, SUCH AS READING THE NEWSPAPER OR WATCHING TELEVISION: SEVERAL DAYS
4. FEELING TIRED OR HAVING LITTLE ENERGY: SEVERAL DAYS
2. FEELING DOWN, DEPRESSED, IRRITABLE, OR HOPELESS: SEVERAL DAYS
9. THOUGHTS THAT YOU WOULD BE BETTER OFF DEAD, OR OF HURTING YOURSELF: NOT AT ALL

## 2021-10-11 ASSESSMENT — FIBROSIS 4 INDEX
FIB4 SCORE: 0.83
FIB4 SCORE: 0.83

## 2021-10-11 NOTE — PROGRESS NOTES
Favian Fatima is a 36 y.o. female who presents with Establish Care (No concerns today) and Follow-Up            1. Well adult exam  This patient was here for a preventative medicine visit today. The Health Maintenance issues that are appropriate for this patient's age and sex were discussed and any that they agreed to were ordered. The patient was also give age and sex appropriate counseling for preventative matters such as diet, exercise, medication usage, and social determinants.      2. Seizure disorder (HCC)  The patient's current medical issue is well controlled on the current therapy with no new symptoms or worsening      3. Depression, major, recurrent, moderate (HCC)  The patient's current medical issue is well controlled on the current therapy with no new symptoms or worsening      Past Medical History:  1/30/2013: Anemia  1/19/2019: Anxiety  9/26/2018: Depression, major, recurrent, moderate (HCC)  No date: Epilepsy associated with specific stimuli (HCC)  No date: Head ache  No date: Seizure (HCC)  History reviewed. No pertinent surgical history.  Social History    Tobacco Use      Smoking status: Never Smoker      Smokeless tobacco: Never Used    Vaping Use      Vaping Use: Never used    Alcohol use: No    Drug use: No    Review of patient's family history indicates:  Problem: Hypertension      Relation: Father          Age of Onset: (Not Specified)      Current Outpatient Medications: •  perampanel (FYCOMPA) 2 MG Tab tablet, Take 1 tablet by mouth at bedtime for 180 days., Disp: 30 tablet, Rfl: 5•  escitalopram (LEXAPRO) 10 MG Tab, Take 1 tablet by mouth every day., Disp: 30 tablet, Rfl: 5•  lacosamide (VIMPAT) 200 MG Tab tablet, Take 1 tablet by mouth 2 times a day for 182 days., Disp: 180 tablet, Rfl: 3•  Brivaracetam (BRIVIACT) 75 MG Tab, Take 150 mg by mouth 2 times a day for 180 days., Disp: 460 tablet, Rfl: 3•  folic acid (FOLVITE) 1 MG Tab, Take 2 Tablets by mouth  "every day., Disp: 60 tablet, Rfl: 11    Patient was instructed on the use of medications, either prescriptions or OTC and informed on when the appropriate follow up time period should be. In addition, patient was also instructed that should any acute worsening occur that they should notify this clinic asap or call 911.          Review of Systems   Constitutional: Negative.  Negative for chills and fever.   HENT: Negative.  Negative for hearing loss.    Eyes: Negative.  Negative for blurred vision and double vision.   Respiratory: Negative.  Negative for cough and hemoptysis.    Cardiovascular: Negative.  Negative for chest pain and palpitations.   Gastrointestinal: Negative.  Negative for heartburn and nausea.   Genitourinary: Negative.  Negative for dysuria.   Musculoskeletal: Negative.  Negative for myalgias.   Skin: Negative.  Negative for rash.   Neurological: Negative.  Negative for dizziness, tingling and headaches.   Endo/Heme/Allergies: Negative.  Does not bruise/bleed easily.   Psychiatric/Behavioral: Negative.  Negative for depression and suicidal ideas.   All other systems reviewed and are negative.             Objective     /62 (BP Location: Left arm, Patient Position: Sitting, BP Cuff Size: Adult)   Pulse (!) 59   Temp 36.4 °C (97.5 °F) (Temporal)   Resp 16   Ht 1.676 m (5' 6\")   Wt 55.1 kg (121 lb 6.4 oz)   SpO2 99%   BMI 19.59 kg/m²      Physical Exam  Vitals and nursing note reviewed.   Constitutional:       General: She is not in acute distress.     Appearance: She is well-developed. She is not diaphoretic.   HENT:      Head: Normocephalic and atraumatic.      Mouth/Throat:      Pharynx: No oropharyngeal exudate.   Eyes:      Pupils: Pupils are equal, round, and reactive to light.   Cardiovascular:      Rate and Rhythm: Normal rate and regular rhythm.      Heart sounds: Normal heart sounds. No murmur heard.   No friction rub. No gallop.    Pulmonary:      Effort: Pulmonary effort is " normal. No respiratory distress.      Breath sounds: Normal breath sounds. No wheezing or rales.   Chest:      Chest wall: No tenderness.   Neurological:      Mental Status: She is alert and oriented to person, place, and time.   Psychiatric:         Behavior: Behavior normal.         Thought Content: Thought content normal.         Judgment: Judgment normal.                             Assessment & Plan        1. Well adult exam      2. Seizure disorder (HCC)      3. Depression, major, recurrent, moderate (HCC)
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
## 2021-12-14 ENCOUNTER — OFFICE VISIT (OUTPATIENT)
Dept: NEUROLOGY | Facility: MEDICAL CENTER | Age: 36
End: 2021-12-14
Attending: NURSE PRACTITIONER

## 2021-12-14 VITALS
HEIGHT: 66 IN | OXYGEN SATURATION: 98 % | DIASTOLIC BLOOD PRESSURE: 60 MMHG | WEIGHT: 119.93 LBS | HEART RATE: 60 BPM | BODY MASS INDEX: 19.27 KG/M2 | SYSTOLIC BLOOD PRESSURE: 106 MMHG | TEMPERATURE: 97.6 F

## 2021-12-14 DIAGNOSIS — Z13.31 SCREENING FOR DEPRESSION: ICD-10-CM

## 2021-12-14 DIAGNOSIS — G93.81 MESIAL TEMPORAL SCLEROSIS: ICD-10-CM

## 2021-12-14 DIAGNOSIS — F39 MOOD DISORDER (HCC): ICD-10-CM

## 2021-12-14 DIAGNOSIS — G40.919 PHARMACORESISTANT INTRACTABLE EPILEPSY (HCC): ICD-10-CM

## 2021-12-14 PROCEDURE — 99212 OFFICE O/P EST SF 10 MIN: CPT | Performed by: NURSE PRACTITIONER

## 2021-12-14 PROCEDURE — 99215 OFFICE O/P EST HI 40 MIN: CPT | Performed by: NURSE PRACTITIONER

## 2021-12-14 ASSESSMENT — FIBROSIS 4 INDEX: FIB4 SCORE: 0.83

## 2021-12-28 DIAGNOSIS — G40.919 PHARMACORESISTANT INTRACTABLE EPILEPSY (HCC): ICD-10-CM

## 2021-12-29 RX ORDER — BRIVARACETAM 75 MG/1
TABLET, FILM COATED ORAL
Qty: 360 TABLET | Refills: 3 | Status: SHIPPED | OUTPATIENT
Start: 2021-12-29 | End: 2022-05-12

## 2021-12-29 RX ORDER — LACOSAMIDE 200 MG/1
TABLET, FILM COATED ORAL
Qty: 180 TABLET | Refills: 3 | Status: SHIPPED | OUTPATIENT
Start: 2021-12-29 | End: 2022-06-01

## 2021-12-30 ENCOUNTER — TELEPHONE (OUTPATIENT)
Dept: NEUROLOGY | Facility: MEDICAL CENTER | Age: 36
End: 2021-12-30

## 2021-12-30 NOTE — TELEPHONE ENCOUNTER
Called [facility] pharmacy to return their VM. Left VM saying that this pt's prescription for vimpat and briviact had already been faxed in, and that I did it again today. Left callback number.

## 2022-01-03 ENCOUNTER — OFFICE VISIT (OUTPATIENT)
Dept: MEDICAL GROUP | Facility: PHYSICIAN GROUP | Age: 37
End: 2022-01-03

## 2022-01-03 VITALS
TEMPERATURE: 97.6 F | OXYGEN SATURATION: 99 % | BODY MASS INDEX: 19.19 KG/M2 | HEART RATE: 61 BPM | DIASTOLIC BLOOD PRESSURE: 58 MMHG | WEIGHT: 119.4 LBS | HEIGHT: 66 IN | RESPIRATION RATE: 16 BRPM | SYSTOLIC BLOOD PRESSURE: 110 MMHG

## 2022-01-03 DIAGNOSIS — J06.9 UPPER RESPIRATORY TRACT INFECTION, UNSPECIFIED TYPE: ICD-10-CM

## 2022-01-03 PROCEDURE — 99213 OFFICE O/P EST LOW 20 MIN: CPT | Performed by: PHYSICIAN ASSISTANT

## 2022-01-03 RX ORDER — AMOXICILLIN AND CLAVULANATE POTASSIUM 875; 125 MG/1; MG/1
1 TABLET, FILM COATED ORAL 2 TIMES DAILY
Qty: 14 TABLET | Refills: 0 | Status: SHIPPED | OUTPATIENT
Start: 2022-01-03 | End: 2022-01-10

## 2022-01-03 ASSESSMENT — PATIENT HEALTH QUESTIONNAIRE - PHQ9: CLINICAL INTERPRETATION OF PHQ2 SCORE: 0

## 2022-01-03 ASSESSMENT — FIBROSIS 4 INDEX: FIB4 SCORE: 0.83

## 2022-01-03 NOTE — PROGRESS NOTES
CC:  Chief Complaint   Patient presents with   • Pharyngitis       HISTORY OF PRESENT ILLNESS: Patient is a 36 y.o. female established patient presenting with a lot phlegm in her throat and green nasal discharge for several months. Had a cough about 3 weeks ago. No sore throat. Was previously taking theraflu OTC but not any more. No CP, SOB, fevers. Has previously tested negative for COVID according to previous provider notes.      No problem-specific Assessment & Plan notes found for this encounter.      Patient Active Problem List    Diagnosis Date Noted   • Iron defeciency anemia 03/13/2020   • Other headache syndrome 01/18/2019   • Depression, major, recurrent, moderate (HCC) 09/26/2018   • Seizure disorder ([facility]) 09/18/2018      Allergies:Patient has no known allergies.    Current Outpatient Medications   Medication Sig Dispense Refill   • BRIVIACT 75 MG Tab Take 2 tablets by mouth twice a day as directed by physician. 360 Tablet 3   • VIMPAT 200 MG Tab tablet Take 1 tablet by mouth twice a day as directed by physician. 180 Tablet 3   • VITAMIN D PO Take  by mouth.     • perampanel (FYCOMPA) 2 MG Tab tablet Take 1 Tablet by mouth at bedtime for 180 days. 30 Tablet 5   • escitalopram (LEXAPRO) 10 MG Tab Take 1 tablet by mouth every day. 30 tablet 5   • folic acid (FOLVITE) 1 MG Tab Take 2 Tablets by mouth every day. 60 tablet 11     No current facility-administered medications for this visit.       Social History     Tobacco Use   • Smoking status: Never Smoker   • Smokeless tobacco: Never Used   Vaping Use   • Vaping Use: Never used   Substance Use Topics   • Alcohol use: No   • Drug use: No     Social History     Social History Narrative    ** Merged History Encounter **            Family History   Problem Relation Age of Onset   • Hypertension Father         ROS:     - Constitutional:  Negative for fever, chills, unexpected weight change, and fatigue/generalized weakness.    - HEENT: Positive for sinus  "congestion. Negative for headaches, vision changes, hearing changes, ear pain, ear discharge,  sore throat, and neck pain.      - Respiratory: Negative for cough, sputum production, chest congestion, dyspnea, wheezing, and crackles.      - Cardiovascular: Negative for chest pain, palpitations, orthopnea, and bilateral lower extremity edema.        Exam:    /58   Pulse 61   Temp 36.4 °C (97.6 °F) (Temporal)   Resp 16   Ht 1.676 m (5' 6\")   Wt 54.2 kg (119 lb 6.4 oz)   SpO2 99%  Body mass index is 19.27 kg/m².    General:  Well nourished, well developed female in NAD  Head is grossly normal.  Neck: Supple. Thyroid is not enlarged. No lymphadenopathy  Pulmonary: Clear to auscultation. No ronchi, wheezing or rales  Cardiac: Regular rate and rhythm. No murmurs.  Skin: Warm and dry. No obvious lesions  Neuro: Normal muscle tone. Gait normal. Alert and oriented.  Psych: Normal mood and affect      Please note that this dictation was created using voice recognition software. I have made every reasonable attempt to correct obvious errors, but I expect that there are errors of grammar and possibly content that I did not discover before finalizing the note.        Assessment/Plan:  1. Upper respiratory tract infection, unspecified type  Likely bacterial URI. Will treat with augmentin. Return if no improvement.  - amoxicillin-clavulanate (AUGMENTIN) 875-125 MG Tab; Take 1 Tablet by mouth 2 times a day for 7 days.  Dispense: 14 Tablet; Refill: 0           "

## 2022-01-26 ENCOUNTER — OFFICE VISIT (OUTPATIENT)
Dept: MEDICAL GROUP | Facility: PHYSICIAN GROUP | Age: 37
End: 2022-01-26

## 2022-01-26 VITALS
RESPIRATION RATE: 20 BRPM | TEMPERATURE: 98.1 F | BODY MASS INDEX: 19.77 KG/M2 | HEIGHT: 66 IN | DIASTOLIC BLOOD PRESSURE: 62 MMHG | HEART RATE: 75 BPM | SYSTOLIC BLOOD PRESSURE: 108 MMHG | OXYGEN SATURATION: 98 % | WEIGHT: 123 LBS

## 2022-01-26 DIAGNOSIS — R07.0 CHRONIC THROAT PAIN: ICD-10-CM

## 2022-01-26 DIAGNOSIS — G89.29 CHRONIC THROAT PAIN: ICD-10-CM

## 2022-01-26 PROCEDURE — 99213 OFFICE O/P EST LOW 20 MIN: CPT | Performed by: PHYSICIAN ASSISTANT

## 2022-01-26 ASSESSMENT — FIBROSIS 4 INDEX: FIB4 SCORE: 0.83

## 2022-01-26 NOTE — PROGRESS NOTES
CC:  Chief Complaint   Patient presents with   • Other     throat pain     Note: Language line  used.    HISTORY OF PRESENT ILLNESS: Patient is a 36 y.o. female established patient presenting for recheck of sore throat. Pt states her throat discomfort improved after last visit 2 weeks ago when I started her on augmentin for URI. However it did not fully resolve. She states today that she has had a discomfort in her throat for several years. Was instructed previously to start taking lemon juice but it did not help at all. Discomfort located over superior aspect of throat. No nasal congestion, seasonal allergies, cough.       No problem-specific Assessment & Plan notes found for this encounter.      Patient Active Problem List    Diagnosis Date Noted   • Iron defeciency anemia 03/13/2020   • Other headache syndrome 01/18/2019   • Depression, major, recurrent, moderate (HCC) 09/26/2018   • Seizure disorder ([facility]) 09/18/2018      Allergies:Patient has no known allergies.    Current Outpatient Medications   Medication Sig Dispense Refill   • BRIVIACT 75 MG Tab Take 2 tablets by mouth twice a day as directed by physician. 360 Tablet 3   • VIMPAT 200 MG Tab tablet Take 1 tablet by mouth twice a day as directed by physician. 180 Tablet 3   • VITAMIN D PO Take  by mouth.     • perampanel (FYCOMPA) 2 MG Tab tablet Take 1 Tablet by mouth at bedtime for 180 days. 30 Tablet 5   • escitalopram (LEXAPRO) 10 MG Tab Take 1 tablet by mouth every day. 30 tablet 5   • folic acid (FOLVITE) 1 MG Tab Take 2 Tablets by mouth every day. 60 tablet 11     No current facility-administered medications for this visit.       Social History     Tobacco Use   • Smoking status: Never Smoker   • Smokeless tobacco: Never Used   Vaping Use   • Vaping Use: Never used   Substance Use Topics   • Alcohol use: No   • Drug use: No     Social History     Social History Narrative    ** Merged History Encounter **            Family History   Problem  "Relation Age of Onset   • Hypertension Father         ROS:     - Constitutional:  Negative for fever, chills, unexpected weight change, and fatigue/generalized weakness.    - HEENT: Positive for sore throat.  Negative for headaches, vision changes, hearing changes, ear pain, ear discharge, rhinorrhea, sinus congestion,  and neck pain.      - Respiratory: Negative for cough, sputum production, chest congestion, dyspnea, wheezing, and crackles.          Exam:    /62   Pulse 75   Temp 36.7 °C (98.1 °F) (Temporal)   Resp 20   Ht 1.676 m (5' 6\")   Wt 55.8 kg (123 lb)   SpO2 98%  Body mass index is 19.85 kg/m².    General:  Well nourished, well developed female in NAD  Head is grossly normal.  Throat: no erythema, no tonsil enlargement, no lesions  Neck: Supple. Thyroid is not enlarged.  Skin: Warm and dry. No obvious lesions  Neuro: Normal muscle tone. Gait normal. Alert and oriented.  Psych: Normal mood and affect      Please note that this dictation was created using voice recognition software. I have made every reasonable attempt to correct obvious errors, but I expect that there are errors of grammar and possibly content that I did not discover before finalizing the note.    .    Assessment/Plan:    1. Chronic throat pain  I explained that since this has been so long standing and that abx did not improve it, she would be best served by seeing an ENT with more advanced instruments to visual her pharnyx.   - Referral to ENT         "

## 2022-01-28 DIAGNOSIS — F32.A DEPRESSION, UNSPECIFIED DEPRESSION TYPE: ICD-10-CM

## 2022-01-28 NOTE — TELEPHONE ENCOUNTER
Received request via: Patient    Was the patient seen in the last year in this department? Yes    Does the patient have an active prescription (recently filled or refills available) for medication(s) requested? Yes     Pt LVM asking for refill to be sent to [facility]. Please advise.

## 2022-01-31 ENCOUNTER — TELEPHONE (OUTPATIENT)
Dept: NEUROLOGY | Facility: MEDICAL CENTER | Age: 37
End: 2022-01-31

## 2022-01-31 RX ORDER — ESCITALOPRAM OXALATE 10 MG/1
TABLET ORAL
Qty: 30 TABLET | Refills: 5 | Status: SHIPPED | OUTPATIENT
Start: 2022-01-31 | End: 2022-04-29
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
## 2022-01-31 NOTE — TELEPHONE ENCOUNTER
Called pt to let her know that her refill request was approved. Pt understood and said she was able to pick it up.

## 2022-02-07 ENCOUNTER — OFFICE VISIT (OUTPATIENT)
Dept: NEUROLOGY | Facility: MEDICAL CENTER | Age: 37
End: 2022-02-07
Attending: NURSE PRACTITIONER

## 2022-02-07 VITALS
SYSTOLIC BLOOD PRESSURE: 120 MMHG | DIASTOLIC BLOOD PRESSURE: 62 MMHG | RESPIRATION RATE: 15 BRPM | TEMPERATURE: 98.7 F | HEIGHT: 66 IN | OXYGEN SATURATION: 98 % | HEART RATE: 72 BPM | WEIGHT: 148.9 LBS | BODY MASS INDEX: 23.93 KG/M2

## 2022-02-07 DIAGNOSIS — F32.A DEPRESSION, UNSPECIFIED DEPRESSION TYPE: ICD-10-CM

## 2022-02-07 DIAGNOSIS — Z30.09 ENCOUNTER FOR FEMALE FAMILY PLANNING COUNSELING: ICD-10-CM

## 2022-02-07 DIAGNOSIS — G40.919 PHARMACORESISTANT INTRACTABLE EPILEPSY (HCC): ICD-10-CM

## 2022-02-07 DIAGNOSIS — Z13.31 SCREENING FOR DEPRESSION: ICD-10-CM

## 2022-02-07 DIAGNOSIS — Z30.09 ENCOUNTER FOR COUNSELING REGARDING CONTRACEPTION: ICD-10-CM

## 2022-02-07 DIAGNOSIS — G93.81 MESIAL TEMPORAL SCLEROSIS: ICD-10-CM

## 2022-02-07 PROCEDURE — 99213 OFFICE O/P EST LOW 20 MIN: CPT | Performed by: NURSE PRACTITIONER

## 2022-02-07 PROCEDURE — 99212 OFFICE O/P EST SF 10 MIN: CPT | Performed by: NURSE PRACTITIONER

## 2022-02-07 ASSESSMENT — FIBROSIS 4 INDEX: FIB4 SCORE: 0.83
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
## 2022-03-03 ENCOUNTER — TELEPHONE (OUTPATIENT)
Dept: MEDICAL GROUP | Facility: PHYSICIAN GROUP | Age: 37
End: 2022-03-03

## 2022-03-03 NOTE — TELEPHONE ENCOUNTER
Phone Number Called: [phone] (home)       Call outcome: Spoke to patient regarding message below.    Message: patient called stating that she was not sure on taking the medications that ENT has given her. Patient states that ENT doctor was not specific enough during her visit and did not explain the medications correctly. We advised her to take the medicine that they had prescribed and to follow up if it is not working for her.

## 2022-03-14 ENCOUNTER — OFFICE VISIT (OUTPATIENT)
Dept: NEUROLOGY | Facility: MEDICAL CENTER | Age: 37
End: 2022-03-14
Attending: NURSE PRACTITIONER

## 2022-03-14 VITALS
DIASTOLIC BLOOD PRESSURE: 80 MMHG | OXYGEN SATURATION: 99 % | WEIGHT: 123.9 LBS | RESPIRATION RATE: 16 BRPM | TEMPERATURE: 97.3 F | BODY MASS INDEX: 19.91 KG/M2 | HEIGHT: 66 IN | HEART RATE: 62 BPM | SYSTOLIC BLOOD PRESSURE: 120 MMHG

## 2022-03-14 DIAGNOSIS — Z30.09 ENCOUNTER FOR FEMALE FAMILY PLANNING COUNSELING: ICD-10-CM

## 2022-03-14 DIAGNOSIS — E55.9 VITAMIN D DEFICIENCY: ICD-10-CM

## 2022-03-14 DIAGNOSIS — F32.A DEPRESSION, UNSPECIFIED DEPRESSION TYPE: ICD-10-CM

## 2022-03-14 DIAGNOSIS — G40.919 PHARMACORESISTANT INTRACTABLE EPILEPSY (HCC): ICD-10-CM

## 2022-03-14 DIAGNOSIS — G93.81 MESIAL TEMPORAL SCLEROSIS: ICD-10-CM

## 2022-03-14 DIAGNOSIS — Z13.31 SCREENING FOR DEPRESSION: ICD-10-CM

## 2022-03-14 PROCEDURE — 99212 OFFICE O/P EST SF 10 MIN: CPT | Performed by: NURSE PRACTITIONER

## 2022-03-14 PROCEDURE — 99214 OFFICE O/P EST MOD 30 MIN: CPT | Performed by: NURSE PRACTITIONER

## 2022-03-14 ASSESSMENT — FIBROSIS 4 INDEX: FIB4 SCORE: 0.83

## 2022-04-11 ENCOUNTER — APPOINTMENT (OUTPATIENT)
Dept: MEDICAL GROUP | Facility: PHYSICIAN GROUP | Age: 37
End: 2022-04-11
Payer: MEDICAID

## 2022-04-28 DIAGNOSIS — F32.A DEPRESSION, UNSPECIFIED DEPRESSION TYPE: ICD-10-CM

## 2022-04-29 ENCOUNTER — TELEPHONE (OUTPATIENT)
Dept: NEUROLOGY | Facility: MEDICAL CENTER | Age: 37
End: 2022-04-29
Payer: MEDICAID

## 2022-04-29 RX ORDER — ESCITALOPRAM OXALATE 10 MG/1
TABLET ORAL
Qty: 30 TABLET | Refills: 5 | Status: SHIPPED | OUTPATIENT
Start: 2022-04-29 | End: 2022-09-19 | Stop reason: SDUPTHER

## 2022-04-29 NOTE — TELEPHONE ENCOUNTER
Pt LVM saying she needed a refill of lexapro at [facility] pharmacy. Refill request approved by Aylin. Called pt back and let her know this. Pt understood.
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
## 2022-05-09 ENCOUNTER — TELEPHONE (OUTPATIENT)
Dept: NEUROLOGY | Facility: MEDICAL CENTER | Age: 37
End: 2022-05-09
Payer: MEDICAID

## 2022-05-09 NOTE — TELEPHONE ENCOUNTER
"Called pt and told her the following information: \"Please have pt f/u with her PCP ASAP regrading her lab work results. Not sure this time if those are related to her seizure medications. She might need further work-up with PCP. Thanks \"    Pt understood and confirmed her appt to see Aylin this upcoming Thursday.             "

## 2022-05-12 ENCOUNTER — OFFICE VISIT (OUTPATIENT)
Dept: NEUROLOGY | Facility: MEDICAL CENTER | Age: 37
End: 2022-05-12
Attending: NURSE PRACTITIONER
Payer: MEDICAID

## 2022-05-12 VITALS
DIASTOLIC BLOOD PRESSURE: 78 MMHG | OXYGEN SATURATION: 100 % | WEIGHT: 121.69 LBS | TEMPERATURE: 96.8 F | HEIGHT: 65 IN | BODY MASS INDEX: 20.28 KG/M2 | HEART RATE: 57 BPM | RESPIRATION RATE: 16 BRPM | SYSTOLIC BLOOD PRESSURE: 128 MMHG

## 2022-05-12 DIAGNOSIS — Z13.31 SCREENING FOR DEPRESSION: ICD-10-CM

## 2022-05-12 DIAGNOSIS — Z30.09 ENCOUNTER FOR COUNSELING REGARDING CONTRACEPTION: ICD-10-CM

## 2022-05-12 DIAGNOSIS — G40.919 PHARMACORESISTANT INTRACTABLE EPILEPSY (HCC): ICD-10-CM

## 2022-05-12 DIAGNOSIS — Z30.09 ENCOUNTER FOR FEMALE FAMILY PLANNING COUNSELING: ICD-10-CM

## 2022-05-12 DIAGNOSIS — D61.818 PANCYTOPENIA (HCC): ICD-10-CM

## 2022-05-12 PROCEDURE — 99212 OFFICE O/P EST SF 10 MIN: CPT | Performed by: PSYCHIATRY & NEUROLOGY

## 2022-05-12 PROCEDURE — 99215 OFFICE O/P EST HI 40 MIN: CPT | Performed by: NURSE PRACTITIONER

## 2022-05-12 ASSESSMENT — PAIN SCALES - GENERAL: PAINLEVEL: NO PAIN

## 2022-05-12 ASSESSMENT — FIBROSIS 4 INDEX: FIB4 SCORE: 1.36

## 2022-05-23 ENCOUNTER — OFFICE VISIT (OUTPATIENT)
Dept: MEDICAL GROUP | Facility: PHYSICIAN GROUP | Age: 37
End: 2022-05-23

## 2022-05-23 VITALS
DIASTOLIC BLOOD PRESSURE: 66 MMHG | TEMPERATURE: 97.2 F | SYSTOLIC BLOOD PRESSURE: 116 MMHG | HEART RATE: 63 BPM | WEIGHT: 122.6 LBS | HEIGHT: 66 IN | OXYGEN SATURATION: 100 % | BODY MASS INDEX: 19.7 KG/M2 | RESPIRATION RATE: 16 BRPM

## 2022-05-23 DIAGNOSIS — D61.818 PANCYTOPENIA (HCC): ICD-10-CM

## 2022-05-23 PROCEDURE — 99213 OFFICE O/P EST LOW 20 MIN: CPT | Performed by: PHYSICIAN ASSISTANT

## 2022-05-23 ASSESSMENT — FIBROSIS 4 INDEX: FIB4 SCORE: 1.36

## 2022-05-23 NOTE — PROGRESS NOTES
CC:  Chief Complaint   Patient presents with   • Follow-Up       HISTORY OF PRESENT ILLNESS: Patient is a 37 y.o. female established patient presenting to review labs.  used.   1. Labs indicate new onset pancytopenia. Her platelets have always been slightly low and her WBC count has also been slightly low. Her levels have dropped further since last checked a year ago. She states that she feels fine overall, just stressed at home. She is currently being treated for epilepsy by her neurologist. Pt currently taking Folic acid supplements and has hx of Fe def anemia per her problem list.        Latest Reference Range & Units 05/27/21 09:45 05/07/22 00:00   WBC 4.8 - 10.8 K/uL 3.5 (L) 2.3 (L)   RBC 4.20 - 5.40 M/uL 4.53 4.15 (L)   Hemoglobin 13.0 - 17.0 g/dL 14.2 11.2 (L)   Hematocrit 39.0 - 50.0 % 41.9 36.0 (L)   MCV 81.0 - 99.0 fL 92.5 86.7   MCH 27.0 - 31.0 pg 31.3 (H) 27.0   MCHC 33.0 - 37.0 g/dL 33.9 31.1 (L)   RDW 11.5 - 14.5 % 12.7 13.6   Platelet Count 130 - 400 K/uL 139 128 (L)   MPV 7.4 - 10.4 fL 10.5 (H) 11.0 (H)   Neutrophils Automated 39.0 - 70.0 % 66.9 49.2   Abs Neutrophils Automated 1.8 - 7.7 K/uL 2.4 1.1 (L)   Lymphocytes Automated 21.0 - 50.0 % 26.3 41.3   Abs Lymph Automated 1.2 - 4.8 K/uL 0.9 (L) 1.0 (L)   Eosinophils Automated 0.0 - 5.0 % 0.0 0.0   Basophils Automated 0.0 - 3.0 % 0.0 0.0   Monocytes Automated 2.0 - 9.0 % 6.8 9.1 (H)   (L): Data is abnormally low  (H): Data is abnormally high  No problem-specific Assessment & Plan notes found for this encounter.      Patient Active Problem List    Diagnosis Date Noted   • Iron defeciency anemia 03/13/2020   • Other headache syndrome 01/18/2019   • Depression, major, recurrent, moderate (HCC) 09/26/2018   • Seizure disorder (HCC) 09/18/2018      Allergies:Patient has no known allergies.    Current Outpatient Medications   Medication Sig Dispense Refill   • perampanel (FYCOMPA) 2 MG Tab tablet Take 1 Tablet by mouth at bedtime for 180  "days. 30 Tablet 5   • brivaracetam (BRIVIACT) 100 MG Tab tablet Take 1 Tablet by mouth 2 times a day for 180 days. 180 Tablet 1   • escitalopram (LEXAPRO) 10 MG Tab TAKE ONE TABLET BY MOUTH EVERY DAY 30 Tablet 5   • PANTOPRAZOLE SODIUM PO Take 40 mg by mouth every day.     • VIMPAT 200 MG Tab tablet Take 1 tablet by mouth twice a day as directed by physician. 180 Tablet 3   • VITAMIN D PO Take  by mouth.     • folic acid (FOLVITE) 1 MG Tab Take 2 Tablets by mouth every day. 60 tablet 11     No current facility-administered medications for this visit.       Social History     Tobacco Use   • Smoking status: Never Smoker   • Smokeless tobacco: Never Used   Vaping Use   • Vaping Use: Never used   Substance Use Topics   • Alcohol use: No   • Drug use: No     Social History     Social History Narrative    ** Merged History Encounter **            Family History   Problem Relation Age of Onset   • Hypertension Father         ROS:     - Constitutional:  Negative for fever, chills, unexpected weight change, and fatigue/generalized weakness.     - Musculoskeletal: Negative for myalgias, back pain, and joint pain.         Exam:    /66   Pulse 63   Temp 36.2 °C (97.2 °F) (Temporal)   Resp 16   Ht 1.676 m (5' 6\")   Wt 55.6 kg (122 lb 9.6 oz)   SpO2 100%  Body mass index is 19.79 kg/m².    General:  Well nourished, well developed female in NAD  Head is grossly normal.  Neck: Supple. Thyroid is not enlarged.  Skin: Warm and dry. No obvious lesions  Neuro: Normal muscle tone. Gait normal. Alert and oriented.  Psych: Normal mood and affect      Please note that this dictation was created using voice recognition software. I have made every reasonable attempt to correct obvious errors, but I expect that there are errors of grammar and possibly content that I did not discover before finalizing the note.    LABS: 5/23/2022: Results reviewed and discussed with the patient, questions answered.    Assessment/Plan:  1. " Pancytopenia (HCC)  Will get repeat of CBC and new vitamin/iron levels and call with results. Consider hematology referral pending results.  - CBC WITH DIFFERENTIAL; Future  - FOLATE; Future  - VITAMIN B12; Future  - IRON/TOTAL IRON BIND; Future  - FERRITIN; Future  - VITAMIN D,25 HYDROXY; Future

## 2022-06-02 ENCOUNTER — TELEPHONE (OUTPATIENT)
Dept: NEUROLOGY | Facility: MEDICAL CENTER | Age: 37
End: 2022-06-02

## 2022-06-02 NOTE — TELEPHONE ENCOUNTER
Vimpat 200mg Tablet    Request rec'd via [employer], ran TC via [place], coverage terminated, liaison will call patient for new coverage - 06/02/2022 1:01pm

## 2022-06-07 DIAGNOSIS — G40.919 PHARMACORESISTANT INTRACTABLE EPILEPSY (HCC): ICD-10-CM

## 2022-06-07 NOTE — TELEPHONE ENCOUNTER
Received request via: Patient    Was the patient seen in the last year in this department? Yes    Does the patient have an active prescription (recently filled or refills available) for medication(s) requested? No     Patient called and said Arnold needs MD signature for briviact refill. Please advise.

## 2022-06-09 ENCOUNTER — TELEPHONE (OUTPATIENT)
Dept: MEDICAL GROUP | Facility: PHYSICIAN GROUP | Age: 37
End: 2022-06-09
Payer: MEDICAID

## 2022-06-09 DIAGNOSIS — D50.9 IRON DEFICIENCY ANEMIA, UNSPECIFIED IRON DEFICIENCY ANEMIA TYPE: ICD-10-CM

## 2022-06-09 DIAGNOSIS — D61.818 PANCYTOPENIA (HCC): ICD-10-CM

## 2022-06-09 NOTE — TELEPHONE ENCOUNTER
----- Message from Catrachita Valladares D.O. sent at 6/8/2022  5:07 PM PDT -----  Please schedule patient with me this week to discuss labs.     Catrachita Valladares D.O.

## 2022-06-09 NOTE — TELEPHONE ENCOUNTER
Talk to pt and explain to her that Dr. Valladares would like for her to come in for an appointment to discuss results. Pt is self pay and can't afford to come right away but will call when she can afford an appointment.

## 2022-06-13 DIAGNOSIS — Z30.09 ENCOUNTER FOR FEMALE FAMILY PLANNING COUNSELING: ICD-10-CM

## 2022-06-14 RX ORDER — FOLIC ACID 1 MG/1
TABLET ORAL
Qty: 60 TABLET | Refills: 11 | Status: SHIPPED | OUTPATIENT
Start: 2022-06-14 | End: 2023-08-11 | Stop reason: SDUPTHER

## 2022-06-20 ENCOUNTER — APPOINTMENT (OUTPATIENT)
Dept: NEUROLOGY | Facility: MEDICAL CENTER | Age: 37
End: 2022-06-20
Attending: NURSE PRACTITIONER
Payer: MEDICAID

## 2022-07-06 DIAGNOSIS — G40.919 PHARMACORESISTANT INTRACTABLE EPILEPSY (HCC): ICD-10-CM

## 2022-07-07 RX ORDER — LACOSAMIDE 200 MG/1
200 TABLET ORAL 2 TIMES DAILY
Qty: 180 TABLET | Refills: 1 | Status: SHIPPED | OUTPATIENT
Start: 2022-07-07 | End: 2022-08-09 | Stop reason: SDUPTHER

## 2022-07-12 ENCOUNTER — TELEPHONE (OUTPATIENT)
Dept: NEUROLOGY | Facility: MEDICAL CENTER | Age: 37
End: 2022-07-12
Payer: MEDICAID

## 2022-07-12 NOTE — TELEPHONE ENCOUNTER
Patient called and wanted to know why her medication has not been dispensed. Could you call the pharmacy to see why, she would like to know if she can come in for samples

## 2022-07-22 ENCOUNTER — OFFICE VISIT (OUTPATIENT)
Dept: MEDICAL GROUP | Facility: PHYSICIAN GROUP | Age: 37
End: 2022-07-22

## 2022-07-22 VITALS
SYSTOLIC BLOOD PRESSURE: 96 MMHG | DIASTOLIC BLOOD PRESSURE: 52 MMHG | WEIGHT: 120 LBS | OXYGEN SATURATION: 98 % | HEIGHT: 66 IN | BODY MASS INDEX: 19.29 KG/M2 | HEART RATE: 59 BPM | TEMPERATURE: 98 F

## 2022-07-22 DIAGNOSIS — N92.1 MENOMETRORRHAGIA: ICD-10-CM

## 2022-07-22 DIAGNOSIS — D50.0 IRON DEFICIENCY ANEMIA DUE TO CHRONIC BLOOD LOSS: ICD-10-CM

## 2022-07-22 PROCEDURE — 99213 OFFICE O/P EST LOW 20 MIN: CPT | Performed by: PHYSICIAN ASSISTANT

## 2022-07-22 RX ORDER — FERROUS SULFATE 325(65) MG
325 TABLET ORAL DAILY
Qty: 90 TABLET | Refills: 1 | Status: SHIPPED | OUTPATIENT
Start: 2022-07-22 | End: 2023-01-18

## 2022-07-22 ASSESSMENT — FIBROSIS 4 INDEX: FIB4 SCORE: 1.27

## 2022-07-22 NOTE — PROGRESS NOTES
CC:    Chief Complaint   Patient presents with   • Establish Care   • Anemia     Positive blood test       HISTORY OF THE PRESENT ILLNESS: Patient is a 37 y.o. female presenting to establish primary care.  used.     1. Pt's labs signficant for severe Fe deficiency anemia. Upon further more in depth review of her past labs she has had flow cytometry and bone marrow biopsy done in 2020 with normal results. She admits today that her menstrual cycles have been irregular lately and happening frequently with heavy flow.     No problem-specific Assessment & Plan notes found for this encounter.    Allergies: Patient has no known allergies.    Current Outpatient Medications Ordered in Epic   Medication Sig Dispense Refill   • perampanel (FYCOMPA) 2 MG Tab tablet Take 1 Tablet by mouth at bedtime for 180 days. 30 Tablet 5   • lacosamide (VIMPAT) 200 MG Tab tablet Take 1 Tablet by mouth 2 times a day for 180 days. 180 Tablet 1   • folic acid (FOLVITE) 1 MG Tab TAKE TWO TABLETS BY MOUTH EVERY DAY 60 Tablet 11   • brivaracetam (BRIVIACT) 100 MG Tab tablet Take 1 Tablet by mouth 2 times a day for 180 days. 180 Tablet 1   • escitalopram (LEXAPRO) 10 MG Tab TAKE ONE TABLET BY MOUTH EVERY DAY 30 Tablet 5   • PANTOPRAZOLE SODIUM PO Take 40 mg by mouth every day.     • VITAMIN D PO Take  by mouth.       No current Epic-ordered facility-administered medications on file.       Past Medical History:   Diagnosis Date   • Anemia 1/30/2013   • Anxiety 1/19/2019   • Depression, major, recurrent, moderate (HCC) 9/26/2018   • Epilepsy associated with specific stimuli (HCC)    • Head ache    • Seizure (HCC)        No past surgical history on file.    Social History     Tobacco Use   • Smoking status: Never Smoker   • Smokeless tobacco: Never Used   Vaping Use   • Vaping Use: Never used   Substance Use Topics   • Alcohol use: No   • Drug use: No       Social History     Social History Narrative    ** Merged History Encounter  "**            Family History   Problem Relation Age of Onset   • Hypertension Father        ROS:     - Constitutional: Positive for fatigue.  Negative for fever, chills, unexpected weight change,      - Endo/Heme/Allergies: Does not bruise/bleed easily.        .      Exam: BP (!) 96/52 (BP Location: Left arm, Patient Position: Sitting, BP Cuff Size: Adult)   Pulse (!) 59   Temp 36.7 °C (98 °F) (Temporal)   Ht 1.676 m (5' 6\")   Wt 54.4 kg (120 lb)   SpO2 98%  Body mass index is 19.37 kg/m².    General: Normal appearing. No acute distress.  Skin: Warm and dry.  No obvious lesions.  HEENT: Normocephalic. Eyes conjunctiva clear lids without ptosis, ears normal shape and contour  Neurologic: Grossly nonfocal, A&O x3, gait normal,  Musculoskeletal: No deformity or swelling.   Extremities: No extremity cyanosis, clubbing, or edema.  Psych: Normal mood and affect. Judgment and insight is normal.    Please note that this dictation was created using voice recognition software. I have made every reasonable attempt to correct obvious errors, but I expect that there are errors of grammar and possibly content that I did not discover before finalizing the note.      Assessment/Plan  1. Iron deficiency anemia due to chronic blood loss  Start on iron replacement and recheck labs in 4 months.   - CBC WITH DIFFERENTIAL; Future  - IRON/TOTAL IRON BIND; Future  - FERRITIN; Future  - ferrous sulfate 325 (65 Fe) MG tablet; Take 1 Tablet by mouth every day for 180 days.  Dispense: 90 Tablet; Refill: 1    2. Menometrorrhagia  Will set up with OBGYN to address menstrual irregularlities. Possibly causing anemia?  - Referral to OB/Gyn     "

## 2022-08-09 ENCOUNTER — TELEPHONE (OUTPATIENT)
Dept: NEUROLOGY | Facility: MEDICAL CENTER | Age: 37
End: 2022-08-09
Payer: MEDICAID

## 2022-08-09 DIAGNOSIS — G40.919 PHARMACORESISTANT INTRACTABLE EPILEPSY (HCC): ICD-10-CM

## 2022-08-09 RX ORDER — LACOSAMIDE 200 MG/1
200 TABLET ORAL 2 TIMES DAILY
Qty: 180 TABLET | Refills: 1 | Status: SHIPPED | OUTPATIENT
Start: 2022-08-09 | End: 2022-08-10 | Stop reason: SDUPTHER

## 2022-08-09 NOTE — TELEPHONE ENCOUNTER
Lacosamide (Vimpat) 200mg Tablet    Request rec'd via [facility], unable to RTC as we have no ACTIVE Rx insurance, [facility] on order will have liaison Ivaan Coleman r/o to patient or release. - 08/09/2022 11:44am

## 2022-08-10 RX ORDER — LACOSAMIDE 200 MG/1
200 TABLET ORAL 2 TIMES DAILY
Qty: 180 TABLET | Refills: 1 | Status: SHIPPED | OUTPATIENT
Start: 2022-08-10 | End: 2022-09-19 | Stop reason: SDUPTHER
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
## 2022-08-31 ENCOUNTER — APPOINTMENT (OUTPATIENT)
Dept: NEUROLOGY | Facility: MEDICAL CENTER | Age: 37
End: 2022-08-31
Attending: NURSE PRACTITIONER
Payer: MEDICAID
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
## 2022-09-19 ENCOUNTER — OFFICE VISIT (OUTPATIENT)
Dept: NEUROLOGY | Facility: MEDICAL CENTER | Age: 37
End: 2022-09-19
Attending: NURSE PRACTITIONER
Payer: MEDICAID

## 2022-09-19 VITALS
SYSTOLIC BLOOD PRESSURE: 100 MMHG | DIASTOLIC BLOOD PRESSURE: 60 MMHG | TEMPERATURE: 98.4 F | HEART RATE: 60 BPM | RESPIRATION RATE: 15 BRPM | OXYGEN SATURATION: 98 % | WEIGHT: 117.06 LBS | BODY MASS INDEX: 18.89 KG/M2

## 2022-09-19 DIAGNOSIS — G93.81 MESIAL TEMPORAL SCLEROSIS: ICD-10-CM

## 2022-09-19 DIAGNOSIS — Z30.09 ENCOUNTER FOR FEMALE FAMILY PLANNING COUNSELING: ICD-10-CM

## 2022-09-19 DIAGNOSIS — G40.919 PHARMACORESISTANT INTRACTABLE EPILEPSY (HCC): ICD-10-CM

## 2022-09-19 DIAGNOSIS — F32.A DEPRESSION, UNSPECIFIED DEPRESSION TYPE: ICD-10-CM

## 2022-09-19 DIAGNOSIS — Z13.31 SCREENING FOR DEPRESSION: ICD-10-CM

## 2022-09-19 PROCEDURE — 99212 OFFICE O/P EST SF 10 MIN: CPT | Performed by: NURSE PRACTITIONER

## 2022-09-19 PROCEDURE — 99214 OFFICE O/P EST MOD 30 MIN: CPT | Performed by: NURSE PRACTITIONER

## 2022-09-19 RX ORDER — ESCITALOPRAM OXALATE 10 MG/1
10 TABLET ORAL
Qty: 30 TABLET | Refills: 5 | Status: SHIPPED | OUTPATIENT
Start: 2022-09-19 | End: 2023-03-18

## 2022-09-19 RX ORDER — LACOSAMIDE 200 MG/1
200 TABLET ORAL 2 TIMES DAILY
Qty: 180 TABLET | Refills: 1 | Status: SHIPPED | OUTPATIENT
Start: 2022-09-19 | End: 2023-03-18

## 2022-09-19 RX ORDER — LACOSAMIDE 200 MG/1
200 TABLET ORAL 2 TIMES DAILY
Qty: 180 TABLET | Refills: 1 | Status: SHIPPED | OUTPATIENT
Start: 2022-09-19 | End: 2022-09-19 | Stop reason: SDUPTHER

## 2022-09-19 ASSESSMENT — FIBROSIS 4 INDEX: FIB4 SCORE: 1.27

## 2022-12-19 ENCOUNTER — TELEPHONE (OUTPATIENT)
Dept: NEUROLOGY | Facility: MEDICAL CENTER | Age: 37
End: 2022-12-19

## 2022-12-19 NOTE — TELEPHONE ENCOUNTER
Called patient through . She cannot afford fycopa and is asking for something cheaper. She does not have any insurance. She did state she has 1 more week left. Sent message  to Medical assistant Bethany and Dr Marin.    Please advise    Thanks

## 2022-12-20 NOTE — TELEPHONE ENCOUNTER
Talked to  about the medication for patient . He said to give the patient samples and in the sample is a discount cards that patient can use once a month. Called patient and she will be in tomorrow .

## 2023-03-27 ENCOUNTER — OFFICE VISIT (OUTPATIENT)
Dept: NEUROLOGY | Facility: MEDICAL CENTER | Age: 38
End: 2023-03-27
Attending: PSYCHIATRY & NEUROLOGY

## 2023-03-27 VITALS
SYSTOLIC BLOOD PRESSURE: 122 MMHG | OXYGEN SATURATION: 100 % | HEART RATE: 74 BPM | BODY MASS INDEX: 19.57 KG/M2 | DIASTOLIC BLOOD PRESSURE: 68 MMHG | TEMPERATURE: 97.2 F | WEIGHT: 121.25 LBS

## 2023-03-27 DIAGNOSIS — G40.919 REFRACTORY EPILEPSY (HCC): ICD-10-CM

## 2023-03-27 DIAGNOSIS — F39 MOOD DISORDER (HCC): ICD-10-CM

## 2023-03-27 PROCEDURE — 99215 OFFICE O/P EST HI 40 MIN: CPT | Performed by: PSYCHIATRY & NEUROLOGY

## 2023-03-27 PROCEDURE — 99212 OFFICE O/P EST SF 10 MIN: CPT | Performed by: PSYCHIATRY & NEUROLOGY

## 2023-03-27 RX ORDER — ESCITALOPRAM OXALATE 10 MG/1
10 TABLET ORAL DAILY
COMMUNITY
End: 2023-04-25 | Stop reason: SDUPTHER

## 2023-03-27 RX ORDER — LACOSAMIDE 200 MG/1
200 TABLET ORAL 2 TIMES DAILY
COMMUNITY
End: 2023-04-25 | Stop reason: SDUPTHER

## 2023-03-27 ASSESSMENT — FIBROSIS 4 INDEX: FIB4 SCORE: 1.341689789943705559

## 2023-03-27 NOTE — PROGRESS NOTES
NEUROLOGY OUTPATIENT CLINIC VISIT NOTE        Chief Complaint: Refractory Epilepsy       HISTORY OF PRESENTING COMPLAINT:   is a 37 year old, [place] citizen speaking right handed lady coming to neurology clinic for follow-up of her refractory epilepsy.  She was previously followed in Neurology clinic by JORY Cheatham and last seen in 09/2022. This was her first visit with me. The details of complaints were obtained from review of the previous clinic notes, available medical records and from discussing with the patient.  She was accompanied by a friend for today's clinic was that who has witnessed her most recent episode.   services was used for today's clinic visit.    Patient has kept a diary of for breakthrough events with almost 3-5 episodes per month.  She has not returned the description of each of her event.  From what I can understand from the patient and her friend these episodes involve her staring into space followed by loss of consciousness and shaking for 1 to 2 minutes.  Sometimes these episodes do not lead to falling to the ground, but staring into space and in 1 to 2 minutes, she is back to baseline with no recollection of the event.    In summary, her seizures started in 2004, after delivery. Based on her previous notes, she was not prescribed any AED. They moved to [place] in 2008, had another seizure while pregnant in 2008.  She was on carbamazepine prior to Keppra.  Keppra was increased to 1000 mg twice daily in January 2019 and had a couple of months without seizures. Lamotrigine added, but had rash with this. She was switched to zonisamide but this might have contributed to her low WBC.  Her EEG showed multifocal epileptic foci with bitemporal foci and seizures on EEG.  MRI showed left hippocampal sclerosis.  She continues to have breakthrough events on multiple medications and proven to be medication refractory.  She is currently on  Fycompa 2 mg at bedtime, Lacosamide 200 mg twice daily and Briviact 100 mg twice daily.  She does not have insurance and could not be hospitalized to the EMU to better localize her seizures and capture her ongoing events.  She continues to be on patient assistance program to get her medications.      She continues to follow up with a therapist.  She was still really on multiple occasions during today's clinic visit but denied any suicidal or homicidal ideation.  She is on Lexapro 10 mg daily which per the patient is managed through her primary care physician.  Patient denied any focal weakness in upper or lower extremities, changes to her vision, diplopia, sensory level or falls other than associated with seizures.    Seizure types and semiology:   Type I:  Seizure type:Generalized  Warning/Aura: Feeling of something bad going to happen  Description: Loses focus and staring followed by loss of body tone and curled up in fetal position with body shaking  Loss of awareness: Yes  Convulsive: Yes  Post-ictal symptoms: Confused on waking up   Frequency:  3-4  /month  Duration: Shaking for up to 2 minutes and confused for 10-15 minutes   Triggers: None   Clusters of seizures: None     Type 2:  Seizure type: Focal  Warning/Aura: May or may not have weird feeling  Description: Staring   Loss of awareness: Yes  Convulsive: No   Post-ictal symptoms: None   Frequency:  1-2 episodes/month   Duration: less than 2 minutes   Triggers: None   Clusters of seizures: None      History of status epilepticus: Based on cEEG in , non convulsive status     Rescue medication: None     Epilepsy Risk Factors:  No known risk factors, including  insults, developmental delay, febrile seizures, CNS infections/strokes, head trauma, or family history of epilepsy    Age of seizure onset: at age 18    Previous AED's:carbamazepine, epitol, Lamotrigine (rash), Keppra, zonisamide (leukopenia)    Last seizure:     Current AED  regimen: Briviact 100 mg BID, Lacosamide 200 mg BID, Fycompa 2 mg QHS     Previous Investigations: Based on available records  MRI Brain: 2020: Left hippocampal sclerosis (mesial temporal sclerosis) with no significant change from 2019 .No acute findings or significant interval change compared with 2019.    Continuous EE2020: Abnormal video EEG recording in the awake, drowsy, and sleep state(s).    Frequent, independent bitemporal sharps and intermittent bitemporal slowing noted. Initially, frequent, mostly brief runs of rhythmic or Lateralized Periodic Discharges (LPDs) on either right or left temporal regions, sometimes independently but may be synchronous. Frequent, brief non convulsive seizures with onset on either right or left temporal chains, with seizures spreading fast bitemporally, but typically becoming more prominent on the right temporal chain were captured. There were no clear clinical changes associated with the seizures, however the patient's mentation was not assessed during these otherwise not clinically detected spells. With further adjustments in anti-seizure medication, seizures have become rare and brief, with considerable improvement of the EEG. The findings suggest multifocal, refractory epilepsy with bitemporal epileptogenicity.    EMU : 2019: Active focal cortical irritability / dysfunction over left temporal --this patten is consistent with left temporal lobe seizure    EE2008: Abnormal EEG, revealing predominantly left temporal slowing with sharp wave activity and phase reversal, rare sharp wave activity with phase reversals noted on the right temporal head region and one episode of a generalized burst of poly-spike and slow wave activity.  No events recorded and no video abnormalities are recorded.    CURRENT MEDICATIONS AT THE TIME OF THIS ENCOUNTER:    Current Outpatient Medications:     escitalopram, 10 mg, Oral, DAILY, Taking    perampanel, Take  by  mouth at bedtime., Taking    lacosamide, 200 mg, Oral, BID, Taking    brivaracetam, 100 mg, Oral, BID, Taking    folic acid, TAKE TWO TABLETS BY MOUTH EVERY DAY, Taking    VITAMIN D PO, Take  by mouth., Taking     PAST MEDICAL HISTORY:  Past Medical History:   Diagnosis    Anemia    Anxiety    Depression with anxiety    Refractory epilepsy        FAMILY HISTORY:  Family History   Problem Relation Age of Onset    Hypertension Father         SOCIAL HISTORY:  Social History     Socioeconomic History    Marital status:      Spouse name: Not on file    Number of children: Not on file    Years of education: Not on file    Highest education level: Not on file   Occupational History    Not on file   Tobacco Use    Smoking status: Never    Smokeless tobacco: Never   Vaping Use    Vaping Use: Never used   Substance and Sexual Activity    Alcohol use: No    Drug use: No    Sexual activity: Yes     Partners: Male   Other Topics Concern    Not on file   Social History Narrative    ** Merged History Encounter **          Social Determinants of Health     Financial Resource Strain: Not on file   Food Insecurity: Not on file   Transportation Needs: Not on file   Physical Activity: Not on file   Stress: Not on file   Social Connections: Not on file   Intimate Partner Violence: Not on file   Housing Stability: Not on file          Review of systems:    All other systems were reviewed and negative other than the ones mentioned in HPI.    EXAM:   Ambulatory Vitals:  /68 (BP Location: Left arm, Patient Position: Sitting, BP Cuff Size: Adult)   Pulse 74   Temp 36.2 °C (97.2 °F) (Temporal)   Wt 55 kg (121 lb 4.1 oz)   SpO2 100%        Physical Exam:   Neurological Exam:  Higher Mental Function: per   Awake, alert and oriented to place, person and time  Able to answer questions appropriately and follow commands well  Memory intact to immediate recall and remote events  Speech is clear and language fluent   Mood:  she was teary when she mentioned about no insurance and high cost of medication    Cranial Nerves:  CN II: Pupils equal and reactive, no visual field deficits on confrontation  CN III,IV, VI : EOM intact with no nystagmus  CN V: Facial sensation intact  CN VII: No facial asymmetry  CN VIII: Intact hearing to conversation  CN IX, X: palate elevates symmetrically   CN XI: Symmetric shoulder shrug  CN XII: tongue midline. No signs of tongue biting or fasciculations    Motor: 5/5  strength in bilateral upper and lower extremities, No tremor at rest or on outstretched hands. Tone normal and no fasciculations  Sensory: Intact to light touch, temperature, and vibration in all 4 extremities  Reflexes: 2+ and symmetric in all 4 extremities  Coordination: Intact to finger to nose in both upper extremities, no truncal or appendicular ataxia  Gait: Normal gait, without the use of any assistance. No difficulty getting up from the chair       General:   Patient in no acute distress, pleasant and cooperative.  HEENT: Normocephalic, no signs of acute trauma.   Neck: Supple. There is normal range of motion.         ASSESSMENT AND PLAN:  Refractory epilepsy:   is a 70-year-old right-handed lady with medication refractory temporal lobe epilepsy.  Based on the available clinic notes she has been tried on multiple AED's and currently on a 3 AED regimen with at least 3-5 episodes per month of breakthrough seizures.  Patient does not have insurance and has not had a phase 1 surgical evaluation for her left-sided MTS and medication refractory epilepsy. Previous EEG studies showed bitemporal epileptic foci. EMU admission would be the next step and they will try to obtain insurance to facilitate this. They will continue on the current AED regiment and I will give her some samples of perampanel and try to get her medications the patient assistance program.  She is already on patient assistance program for the Briviact and  Lacosamide.  I gave her a lab slip to check her CBC and comprehensive metabolic panel and went over the side effects of the medication.  She denied any worsening behavioral issues or other concerns other than breakthrough episodes.  I talked about the risk for having ongoing episodes, increased frequency of seizures, and risk for conversion to status epilepticus if seizures are not well controlled.     - Continue AED at current doses: Perampanel 2mg QHS, Briviact 100 mg BID, Lacosamide 200 mg BID    - Discussed side effects of medications and drug interactions       - Discussed avoidance of spell/sz triggers: alcohol, sleep deprivation, energy drinks, benadryl and stress.     - Discussed Vit D supplementation. Recommended taking 2000-5000u daily.     - Patient is not driving. Discussed driving restrictions       -Labs : CBC and CMP ordered    - Risks of AED in reproductive age group: Contraceptive methods and folic acid 1 mg daily     2. Mood disorder:  She is on escitalopram 10 mg daily and continues to follow-up with a therapist.  Patient was teary-eyed on multiple occasions when discussing her lack of insurance and the cost of medication.  She denied any suicidal or homicidal ideation.  I recommended close follow-up with her therapist and she mentioned the refills are being sent through her primary care physician.     She will follow up with me in 6 months. She will try to obtain insurance and continue to follow up closely with her primary care physician and therapist for other medical co morbidities.  If there are any breakthrough episodes of concerns of side effects or new symptoms, they will reach out to our clinic or seek immediate medical attention for any urgent matters.     Total time of the visit was 54 minutes including time spent in precharting, review of the previous history and test results, documentation, discussing medication safety, side effects, ordering labs, reviewing plan of care with the help  of the  and answering patient's questions .      EDUCATION, COUNSELING:    - Education was provided to the patient and/or family regarding diagnosis and prognosis. The chronic and unpredictable nature of the condition were discussed. There is increased risk for additional events, which may carry potential for significant injuries and death. Discussed frequent seizure triggers: sleep deprivation, medication non-compliance, use of illegal drugs/alcohol, stress, and others.     - Reviewed in detail the current antiepileptic regimen. Potential side effects of antiepileptics were discussed at length, including but no limited to: hypersensitivity reactions (rash and others, some of which can be fatal), visual field changes (some of which may be irreversible), glaucoma, diplopia, kidney stones, osteopenia/osteoporosis/ bone fractures, hyperthermia/anhydrosis, hyponatremia, tremors/abnormal movements, ataxia, dizziness, fatigue, increased risk for falls, risk for cardiac arrhythmias and syncope, weight changes, hair loss, gastrointestinal side effects(hepatitis, pancreatitis, gastritis, ulcers, gingival hypertrophy/bleeding, drowsiness, sedation, memory loss, trouble finding words, anxiety/nervousness, increased risk for suicide, increased risk for depression, and psychosis.     - Reviewed drug-drug interactions and their potential effect on seizure control and medication side effects.      -Recommend chronic vitamin D supplementation and regular exercise (if not contraindicated).     -Patient/family educated on risk for SUDEP (Sudden Death in Epilepsy). Counseling was provided on the importance of strict medication and follow up compliance. The patient/family understand the risks associated with non-adherence with the medical plan as outlined, including but not limited to an increased risk for breakthrough seizures, which may contribute to injuries, disability, status epilepticus, and even death.     -Counseling  was also provided on potential effects of alcohol and other drugs, which may lower seizure threshold and/or affect the metabolism of antiepileptic drugs. We recommend avoidance of alcohol and illegal drugs and avoid sleep deprivation.     - Patient is not driving    -Other seizure precautions were discussed at length, including no diving, no skydiving, no climbing or exposure to unprotected heights, no unsupervised swimming, no Jacuzzi or bathing in bathtubs or deep bodies of water. The patient/family have been advised about risks for operating any machinery while suffering from seizures / syncope / epilepsy and/or while taking antiepileptic drugs.     -The patient understands and agrees that due to the complexity of his/her diagnosis, results of any testing and further recommendations will typically be discussed/made during a face to face encounter in office. The patient and/or family further understands it is their responsibility to keep proper follow up.      Patient/family agree with plan, as outlined.       Bony Sommers MD, MHS  [facility] Neurology

## 2023-04-25 ENCOUNTER — TELEPHONE (OUTPATIENT)
Dept: NEUROLOGY | Facility: MEDICAL CENTER | Age: 38
End: 2023-04-25

## 2023-04-25 ENCOUNTER — TELEPHONE (OUTPATIENT)
Dept: SCHEDULING | Facility: IMAGING CENTER | Age: 38
End: 2023-04-25

## 2023-04-25 DIAGNOSIS — R56.9 SEIZURES (HCC): ICD-10-CM

## 2023-04-25 RX ORDER — LACOSAMIDE 200 MG/1
200 TABLET ORAL 2 TIMES DAILY
Qty: 60 TABLET | Refills: 0 | Status: SHIPPED | OUTPATIENT
Start: 2023-04-25 | End: 2023-05-01 | Stop reason: SDUPTHER

## 2023-04-25 RX ORDER — ESCITALOPRAM OXALATE 10 MG/1
10 TABLET ORAL DAILY
Qty: 30 TABLET | Refills: 0 | Status: SHIPPED | OUTPATIENT
Start: 2023-04-25 | End: 2023-05-18 | Stop reason: SDUPTHER

## 2023-04-25 NOTE — TELEPHONE ENCOUNTER
Reviewed the chart and confirmed the doses of Vimpat, Briviact, and Lexapro, as outlined by Dr. Sommers in his note. I provided refills while covering for Dr. Sommers. Thank you.

## 2023-04-25 NOTE — TELEPHONE ENCOUNTER
Patient called and is requesting refill on medications. Stated she is completely out and is waiting for response on MyChart.

## 2023-04-25 NOTE — TELEPHONE ENCOUNTER
MRN: [id number]     PT NAME: LEENA FISHMAN   ISAIAS    PREFERRED PHARM: [facility]     MEDICATION: escitalopram (LEXAPRO) 10 MG Tab   brivaracetam (BRIVIACT) 100 MG  lacosamide (VIMPAT) 200 MG Tab tablet       Patient is reaching out to get medication refills sent to the pharmacy. She said the pharmacy will not refill and she is out of all her medication. She has left a couple voicemails and sent a [employer] message as well.     PT PH: [phone]    Thank you.

## 2023-04-25 NOTE — TELEPHONE ENCOUNTER
Received request via: Patient    Was the patient seen in the last year in this department? Yes    Does the patient have an active prescription (recently filled or refills available) for medication(s) requested? Yes.   Does the patient have [facility] Plus and need 100 day supply (blood pressure, diabetes and cholesterol meds only)? Patient does not have SCP    Patient called and is requesting refill on medications. Stated she is completely out and is waiting for response on MyChart

## 2023-04-26 DIAGNOSIS — R56.9 SEIZURES (HCC): ICD-10-CM

## 2023-04-26 NOTE — TELEPHONE ENCOUNTER
I reviewed the chart and confirmed the dose of Fycompa. I provided refills for Fycompa while covering for Dr. Sommers. Thank you.

## 2023-05-01 DIAGNOSIS — R56.9 SEIZURES (HCC): ICD-10-CM

## 2023-05-01 RX ORDER — LACOSAMIDE 200 MG/1
TABLET, FILM COATED ORAL
Qty: 180 TABLET | Refills: 1 | Status: SHIPPED | OUTPATIENT
Start: 2023-05-01 | End: 2023-05-03 | Stop reason: SDUPTHER

## 2023-05-01 RX ORDER — BRIVARACETAM 100 MG/1
TABLET, FILM COATED ORAL
Qty: 360 TABLET | Refills: 1 | Status: SHIPPED | OUTPATIENT
Start: 2023-05-01 | End: 2023-05-03 | Stop reason: SDUPTHER

## 2023-05-01 NOTE — TELEPHONE ENCOUNTER
Received request via: Pharmacy    Was the patient seen in the last year in this department? Yes    Does the patient have an active prescription (recently filled or refills available) for medication(s) requested? No    Does the patient have alf Plus and need 100 day supply (blood pressure, diabetes and cholesterol meds only)? Medication is not for cholesterol, blood pressure or diabetes    *original script sent to the wrong pharmacy*

## 2023-05-03 RX ORDER — LACOSAMIDE 200 MG/1
TABLET ORAL
Qty: 180 TABLET | Refills: 1 | Status: SHIPPED | OUTPATIENT
Start: 2023-05-03 | End: 2023-05-18 | Stop reason: SDUPTHER

## 2023-05-03 NOTE — TELEPHONE ENCOUNTER
Refilled the following medication(s) below and sent it to the following pharmacy:      Requested Prescriptions     Signed Prescriptions Disp Refills    lacosamide (VIMPAT) 200 MG Tab tablet 180 Tablet 1     Sig: Take 1 tablet by mouth twice a day as directed by physician.     Authorizing Provider: AMANDA MARIN    brivaracetam (BRIVIACT) 100 MG Tab tablet 360 Tablet 1     Sig: Take 2 tablets by mouth twice a day as directed by physician.     Authorizing Provider: AMANDA MARIN            [facility] [address]  [address]  [place] [address]  Phone: [phone] Fax: [phone]          Amanda Marin MD  Department of Neurology at [facility]  General Neurologist and Epileptologist  Director of [facility] Level III Comprehensive Epilepsy Program  Professor of Clinical Neurology, [facility].   Phone: [phone]  Fax: [phone]  E-mail: [email or web address]

## 2023-05-18 ENCOUNTER — TELEPHONE (OUTPATIENT)
Dept: NEUROLOGY | Facility: MEDICAL CENTER | Age: 38
End: 2023-05-18

## 2023-05-18 DIAGNOSIS — R56.9 SEIZURES (HCC): ICD-10-CM

## 2023-05-18 DIAGNOSIS — G40.919 REFRACTORY EPILEPSY (HCC): ICD-10-CM

## 2023-05-18 RX ORDER — ESCITALOPRAM OXALATE 10 MG/1
10 TABLET ORAL DAILY
Qty: 90 TABLET | Refills: 3 | Status: SHIPPED | OUTPATIENT
Start: 2023-05-18 | End: 2024-05-17

## 2023-05-18 RX ORDER — LACOSAMIDE 200 MG/1
200 TABLET ORAL 2 TIMES DAILY
Qty: 180 TABLET | Refills: 3 | Status: SHIPPED | OUTPATIENT
Start: 2023-05-18 | End: 2023-08-15 | Stop reason: SDUPTHER

## 2023-05-18 NOTE — TELEPHONE ENCOUNTER
Called patient on telephone using   More than 18 minutes spent on this phone call answering her questions  She mentioned she has insurance now    Script for medications requested by patient sent to pharmacy  Escitalopram 10 mg daily  Lacosamide 200 mg BID  Briviact 100 mg BID  [facility]    EMU admission ordered, for refractory epilepsy if insurance approves    Also discussed lab results and patient will follow up with her PCP JORY Jewell for follow up

## 2023-05-19 ENCOUNTER — TELEPHONE (OUTPATIENT)
Dept: NEUROLOGY | Facility: MEDICAL CENTER | Age: 38
End: 2023-05-19

## 2023-05-19 NOTE — TELEPHONE ENCOUNTER
,  I spoke with  in regards to scheduling her EMU. She is currently scheduled for 6/19/2023@0600 pending insurance authorization.  While speaking to her she asked that I inform you that the refill sent in 5/18/2023 for her Briviact was sent as 100mg BID, She assures me that she has been taking 200mg BID.I asked her to confirm she is also taking VIMPAT 200mg BID.  I did let her know per our conversation she is to continue to take the dose of Briviact as she has been 200mg BID and you will submit a new refill Monday.  I also explained to her that she is to keep her PCP appointment which is scheduled during her EMU admission for now in the event that her insurance is not approved.    Thank you   Tran

## 2023-08-03 ENCOUNTER — TELEPHONE (OUTPATIENT)
Dept: NEUROLOGY | Facility: MEDICAL CENTER | Age: 38
End: 2023-08-03

## 2023-08-08 ENCOUNTER — OFFICE VISIT (OUTPATIENT)
Dept: NEUROLOGY | Facility: MEDICAL CENTER | Age: 38
End: 2023-08-08
Attending: PSYCHIATRY & NEUROLOGY

## 2023-08-08 VITALS
DIASTOLIC BLOOD PRESSURE: 70 MMHG | TEMPERATURE: 97.1 F | BODY MASS INDEX: 18.75 KG/M2 | OXYGEN SATURATION: 97 % | HEART RATE: 56 BPM | WEIGHT: 116.18 LBS | SYSTOLIC BLOOD PRESSURE: 116 MMHG

## 2023-08-08 DIAGNOSIS — G40.919 REFRACTORY EPILEPSY (HCC): ICD-10-CM

## 2023-08-08 DIAGNOSIS — R56.9 SEIZURES (HCC): ICD-10-CM

## 2023-08-08 DIAGNOSIS — F39 MOOD DISORDER (HCC): ICD-10-CM

## 2023-08-08 PROCEDURE — 3074F SYST BP LT 130 MM HG: CPT | Performed by: PSYCHIATRY & NEUROLOGY

## 2023-08-08 PROCEDURE — 3078F DIAST BP <80 MM HG: CPT | Performed by: PSYCHIATRY & NEUROLOGY

## 2023-08-08 PROCEDURE — 99215 OFFICE O/P EST HI 40 MIN: CPT | Performed by: PSYCHIATRY & NEUROLOGY

## 2023-08-08 PROCEDURE — 99212 OFFICE O/P EST SF 10 MIN: CPT | Performed by: PSYCHIATRY & NEUROLOGY

## 2023-08-08 ASSESSMENT — FIBROSIS 4 INDEX: FIB4 SCORE: 1.03

## 2023-08-08 NOTE — PROGRESS NOTES
NEUROLOGY FOLLOW UP VISIT NOTE       Chief Complaint: Refractory Epilepsy      INTERVAL HISTORY FROM PREVIOUS CLINIC VISIT :    is a 38 year old, Tamazight speaking right handed lady coming to neurology clinic for follow-up of her refractory epilepsy.  She was previously seen in 03/2023 and details of her complaints are documented in my initial consult note from 3/27/2023.  Prior to seeing me she was being followed in Neurology clinic by JORY Cheatham and last seen in 09/2022. The details of complaints were obtained from review of the previous clinic notes, available medical records and from discussing with the patient.  She was accompanied by a friend for today's clinic was that who has witnessed her episodes.   services was used for today's clinic visit.    It was extremely difficult to get direct answers to most questions even with the help of an .  From what I can understand patient continues to have multiple episodes a month.  At least 3-5 /month when she can fall and can have loss of consciousness.  She  also has multiple episodes a week, staring into space for 1 to 2 minutes and then back to baseline without recollection of the events.  I asked her to keep an event diary which she said she has done but did not bring it for today's appointment.    Patient still does not have insurance and we are unable to get any EEG studies completed.  She was referred to epilepsy monitoring unit admission but could not be completed.  Ambulatory EEG study again not covered as she does not have insurance.  Patient is somehow able to get her antiepileptic medications through her pharmacy.  She mentioned having high co-pay.  They are in the process of trying to apply for insurance again with the next open enrollment in October.    She went to ER on 07/21/2023 for continued right arm pain.  She could not straighten her right elbow completely and  her right shoulder hurts.  This was following a fall 2 days prior likely from a seizure and she was diagnosed with closed non displaced fracture of coronoid of right ulna and contusion of right shoulder.  She follows up with her primary care physician for her anemia and low RBC count.    I went over with each of her medications today and patient mentioned being on Briviact 100 mg tablets 2 tablets twice a day, Lexapro 10 mg once daily, folic acid 2 mg daily, lacosamide 200 mg 1 tablet twice daily, perampanel 2 mg once daily at bedtime.    Labs: 2023: RBC count : 2.8, Hb: 10/7, Hct : 34.8, CMP from 2023: WNL    History of status epilepticus: Based on cEEG in , non convulsive status     Rescue medication: None      Epilepsy Risk Factors:  No known risk factors, including  insults, developmental delay, febrile seizures, CNS infections/strokes, head trauma, or family history of epilepsy     Age of seizure onset: at age 18     Previous AED's:carbamazepine, epitol, Lamotrigine (rash), Keppra, zonisamide (leukopenia)     Last seizure: 2023     Current AED regimen: Briviact 200 mg BID, Lacosamide 200 mg BID, Fycompa 2 mg QHS     REVIEW OF HISTORY OF PRESENTING COMPLAINT:  In summary, her seizures started in , after delivery. Based on her previous notes, she was not prescribed any AED. [place] moved to [place] in , had another seizure while pregnant in .  She was on carbamazepine prior to Keppra.  Keppra was increased to 1000 mg twice daily in 2019 and had a couple of months without seizures. Lamotrigine added, but had rash with this. She was switched to zonisamide but this might have contributed to her low WBC.  Her EEG showed multifocal epileptic foci with bitemporal foci and seizures on EEG.  MRI showed left hippocampal sclerosis.  She continues to have breakthrough events on multiple medications and proven to be medication refractory. She does not have insurance and could not be  hospitalized to the EMU to better localize her seizures and capture her ongoing events.  She continues to be on patient assistance program to get her medications.       She continues to follow up with a therapist.  She denied any suicidal or homicidal ideation.  She is on Lexapro 10 mg daily .  Patient denied any focal weakness in upper or lower extremities, other than her right upper extremity pain issues as above.  No changes to her vision, diplopia, sensory level or falls other than associated with seizures.     Seizure types and semiology:   Type I:  Seizure type:Generalized  Warning/Aura: Feeling of something bad going to happen  Description: Loses focus and staring followed by loss of body tone and curled up in fetal position with body shaking  Loss of awareness: Yes  Convulsive: Yes  Post-ictal symptoms: Confused on waking up   Frequency:  3-4  /month  Duration: Shaking for up to 2 minutes and confused for 10-15 minutes   Triggers: None   Clusters of seizures: None      Type 2:  Seizure type: Focal  Warning/Aura: May or may not have weird feeling  Description: Staring   Loss of awareness: Yes  Convulsive: No   Post-ictal symptoms: None   Frequency:  1-2 episodes/week  Duration: less than 2 minutes   Triggers: None   Clusters of seizures: None      Previous Investigations: Based on available records  MRI Brain: 2020: Left hippocampal sclerosis (mesial temporal sclerosis) with no significant change from 2019 .No acute findings or significant interval change compared with 2019.     Continuous EE2020: Abnormal video EEG recording in the awake, drowsy, and sleep state(s).    Frequent, independent bitemporal sharps and intermittent bitemporal slowing noted. Initially, frequent, mostly brief runs of rhythmic or Lateralized Periodic Discharges (LPDs) on either right or left temporal regions, sometimes independently but may be synchronous. Frequent, brief non convulsive seizures with onset on either  right or left temporal chains, with seizures spreading fast bitemporally, but typically becoming more prominent on the right temporal chain were captured. There were no clear clinical changes associated with the seizures, however the patient's mentation was not assessed during these otherwise not clinically detected spells. With further adjustments in anti-seizure medication, seizures have become rare and brief, with considerable improvement of the EEG. The findings suggest multifocal, refractory epilepsy with bitemporal epileptogenicity.     EMU : 2019: Active focal cortical irritability / dysfunction over left temporal --this patten is consistent with left temporal lobe seizure     EE2008: Abnormal EEG, revealing predominantly left temporal slowing with sharp wave activity and phase reversal, rare sharp wave activity with phase reversals noted on the right temporal head region and one episode of a generalized burst of poly-spike and slow wave activity.  No events recorded and no video abnormalities are recorded.      CURRENT MEDICATIONS AT THE TIME OF THIS ENCOUNTER:    Current Outpatient Medications:     brivaracetam, 200 mg, Oral, BID    perampanel, 2 mg, Oral, QHS    Ferrous Sulfate (IRON PO), 1 Tablet, Oral, DAILY, Taking    escitalopram, 10 mg, Oral, DAILY, Taking    lacosamide, 200 mg, Oral, BID, Taking    folic acid, TAKE TWO TABLETS BY MOUTH EVERY DAY (Patient taking differently: 1 mg, Oral, DAILY), Taking    VITAMIN D PO, Take  by mouth., Taking     PAST MEDICAL HISTORY:  Past Medical History:   Diagnosis    Anemia    Anxiety    Depression with anxiety    Refractory epilepsy       PAST SURGICAL HISTORY:  No past surgical history on file.     FAMILY HISTORY:  Family History   Problem Relation Age of Onset    Hypertension Father         SOCIAL HISTORY:  Social History     Tobacco Use    Smoking status: Never    Smokeless tobacco: Never   Vaping Use    Vaping Use: Never used   Substance Use  Topics    Alcohol use: No    Drug use: No          Review of systems:      All other systems are reviewed and negative other than the ones mentioned in HPI.     EXAM:   Ambulatory Vitals:  /70 (BP Location: Right arm, Patient Position: Sitting, BP Cuff Size: Adult)   Pulse (!) 56   Temp 36.2 °C (97.1 °F) (Temporal)   Wt 52.7 kg (116 lb 2.9 oz)   SpO2 97%      Physical Exam:   Neurological Exam:  Higher Mental Function: per   Awake, alert and oriented to place, person and time  Able to answer questions appropriately and follow commands well  Memory intact to immediate recall and remote events  Speech is clear and language fluent      Cranial Nerves:  CN II: Pupils equal and reactive, no visual field deficits on confrontation  CN III,IV, VI : EOM intact with no nystagmus  CN V: Facial sensation intact  CN VII: No facial asymmetry  CN VIII: Intact hearing to conversation  CN IX, X: palate elevates symmetrically   CN XI: Symmetric shoulder shrug  CN XII: tongue midline. No signs of tongue biting or fasciculations     Motor: 5/5  strength in bilateral upper and lower extremities, with some limitation in right UE testing due to pain No tremor at rest or on outstretched hands. Tone normal and no fasciculations  Sensory: Intact to light touch, temperature, and vibration in all 4 extremities  Reflexes: 2+ and symmetric in all 4 extremities  Coordination: Intact to finger to nose in both upper extremities, no truncal or appendicular ataxia  Gait: Normal gait, without the use of any assistance. No difficulty getting up from the chair      General:   Patient in no acute distress, pleasant and cooperative.  HEENT: Normocephalic, no signs of acute trauma.   Neck: Supple. There is normal range of motion.        ASSESSMENT AND PLAN:  Refractory epilepsy:   is a 38 year-old right-handed lady with medication refractory temporal lobe epilepsy. Based on the available clinic notes she has been tried on  multiple AED's and currently on a 3 AED regimen with at least 3-5 episodes per month of breakthrough seizures with loss of consciousness and other episodes of staring almost every week.      Patient still does not have insurance and has not had a phase 1 surgical evaluation for her left-sided MTS and medication refractory epilepsy. Previous EEG studies showed bitemporal epileptic foci. EMU admission would be the next step for evaluation. She will continue on the current AED regimen and I will give her some samples of perampanel and try to get her medications the patient assistance program.  She is already on patient assistance program for the Briviact and Lacosamide.      She denied any worsening behavioral issues or other concerns other than breakthrough episodes.  I talked about the risk for having ongoing episodes, increased frequency of seizures, and risk for conversion to status epilepticus, if seizures are not well controlled.  She will inform our clinic once she gets insurance to order EMU admission again and if not approved at least 2 to 3-day ambulatory EEG study, to determine her seizure burden.     - Continue AED at current doses: Perampanel 2mg QHS, Briviact 200 mg BID, Lacosamide 200 mg BID. Refills for Briviact and Perampanel sent. 28 days samples for Perampanel 2 mg daily also given to patient.     - Discussed side effects of medications and drug interactions       - Discussed avoidance of spell/sz triggers: alcohol, sleep deprivation, energy drinks, benadryl and stress.     - Discussed Vit D supplementation. Recommended taking 2000 IU daily.     - Patient is not driving. Discussed driving restrictions       -Labs : reviewed     - Risks of AED in reproductive age group: Contraceptive methods and folic acid 2 mg daily      2. Mood disorder:  She is on escitalopram 10 mg daily and continues to follow-up with a therapist. She denied any suicidal or homicidal ideation.  I recommended close follow-up with  her therapist      She will follow up with me in 4 months, with plans to obtain insurance soon and get either AEEG or EMU admission completed, prior to her follow up visit with me. She will continue to follow up closely with her primary care physician for monitoring of her anemia and abnormalities on CBC , with orthopedic surgeons for her right arm pain and non displaced ulna fracture and with her therapist for mood issues. If there are any breakthrough episodes, side effects or new symptoms, she will reach out to our clinic or seek immediate medical attention for any urgent matters.  I reminded her to continue to keep an event diary and bring it with her at her next follow-up visit.      Total time of the visit was 56 minutes including time spent in precharting, review of the previous history and test results, documentation, discussing medication safety, side effects, ordering labs, reviewing plan of care with the help of the  and answering patient's questions .  Majority of the time was spent in trying to answer patient's multiple questions, going over medications, discussing need for additional testing before changing her medications and importance of establishing insurance before we can order additional testing.     EDUCATION, COUNSELING:     - Education was provided to the patient and/or family regarding diagnosis and prognosis. The chronic and unpredictable nature of the condition were discussed. There is increased risk for additional events, which may carry potential for significant injuries and death. Discussed frequent seizure triggers: sleep deprivation, medication non-compliance, use of illegal drugs/alcohol, stress, and others.      - Reviewed in detail the current antiepileptic regimen. Potential side effects of antiepileptics were discussed at length, including but no limited to: hypersensitivity reactions (rash and others, some of which can be fatal), visual field changes (some of which may  be irreversible), glaucoma, diplopia, kidney stones, osteopenia/osteoporosis/ bone fractures, hyperthermia/anhydrosis, hyponatremia, tremors/abnormal movements, ataxia, dizziness, fatigue, increased risk for falls, risk for cardiac arrhythmias and syncope, weight changes, hair loss, gastrointestinal side effects(hepatitis, pancreatitis, gastritis, ulcers, gingival hypertrophy/bleeding, drowsiness, sedation, memory loss, trouble finding words, anxiety/nervousness, increased risk for suicide, increased risk for depression, and psychosis.      - Reviewed drug-drug interactions and their potential effect on seizure control and medication side effects.       -Recommend chronic vitamin D supplementation and regular exercise (if not contraindicated).      -Patient/family educated on risk for SUDEP (Sudden Death in Epilepsy). Counseling was provided on the importance of strict medication and follow up compliance. The patient/family understand the risks associated with non-adherence with the medical plan as outlined, including but not limited to an increased risk for breakthrough seizures, which may contribute to injuries, disability, status epilepticus, and even death.      -Counseling was also provided on potential effects of alcohol and other drugs, which may lower seizure threshold and/or affect the metabolism of antiepileptic drugs. We recommend avoidance of alcohol and illegal drugs and avoid sleep deprivation.      - Patient is not driving     -Other seizure precautions were discussed at length, including no diving, no skydiving, no climbing or exposure to unprotected heights, no unsupervised swimming, no Jacuzzi or bathing in bathtubs or deep bodies of water. The patient/family have been advised about risks for operating any machinery while suffering from seizures / syncope / epilepsy and/or while taking antiepileptic drugs.      -The patient understands and agrees that due to the complexity of his/her diagnosis,  results of any testing and further recommendations will typically be discussed/made during a face to face encounter in office. The patient and/or family further understands it is their responsibility to keep proper follow up.      Patient/family agree with plan, as outlined.      Bony Sommers MD, MHS  Department of Neurology at [facility]

## 2023-08-11 DIAGNOSIS — Z30.09 ENCOUNTER FOR FEMALE FAMILY PLANNING COUNSELING: ICD-10-CM

## 2023-08-11 RX ORDER — FOLIC ACID 1 MG/1
2 TABLET ORAL
Qty: 60 TABLET | Refills: 11 | Status: SHIPPED | OUTPATIENT
Start: 2023-08-11

## 2023-08-11 NOTE — TELEPHONE ENCOUNTER
Received request via: Patient    Was the patient seen in the last year in this department? Yes    Does the patient have an active prescription (recently filled or refills available) for medication(s) requested? Yes.     Does the patient have USP Plus and need 100 day supply (blood pressure, diabetes and cholesterol meds only)? No     Dr. Sommers patient- [facility].Per patient-taking 2 tablets daily. Please resend a new prescription with the right dose. Thank you. MEGAN Estes.

## 2023-08-14 ENCOUNTER — TELEPHONE (OUTPATIENT)
Dept: NEUROLOGY | Facility: MEDICAL CENTER | Age: 38
End: 2023-08-14

## 2023-08-14 NOTE — TELEPHONE ENCOUNTER
Dawna Gallo. This is one of Dr. Sommers patient requesting for the Vimpat and the Briviact to be resent to [facility] please. Both written by Dr. Sommers on 8/8. Patient state out of medicationThank you. MEGAN Estes.

## 2023-08-15 ENCOUNTER — TELEPHONE (OUTPATIENT)
Dept: NEUROLOGY | Facility: MEDICAL CENTER | Age: 38
End: 2023-08-15

## 2023-08-15 DIAGNOSIS — G40.919 REFRACTORY EPILEPSY (HCC): ICD-10-CM

## 2023-08-15 DIAGNOSIS — R56.9 SEIZURES (HCC): ICD-10-CM

## 2023-08-15 RX ORDER — LACOSAMIDE 200 MG/1
200 TABLET ORAL 2 TIMES DAILY
Qty: 180 TABLET | Refills: 3 | Status: SHIPPED | OUTPATIENT
Start: 2023-08-15 | End: 2024-08-14

## 2023-08-15 NOTE — TELEPHONE ENCOUNTER
Lacosamide (Vimpat) 200 MG Tabs    Request rec'd via [facility], Patient does NOT have RX insurance, fills at local [facility] and uses a discount card will have liaison Ivana Coleman release.

## 2023-08-15 NOTE — PROGRESS NOTES
I am covering Dr. Sommers's inbox in his absence. The patient requested a refill of Briviact and Vimpat. I reviewed recent pertinent documentation to determine safety of refilling the medication and verified dosing. Prescription sent to patient's preferred pharmacy, [facility].

## 2023-09-01 NOTE — TELEPHONE ENCOUNTER
Refills have been requested for the following medications:         lacosamide (VIMPAT) 200 MG Tab tablet [Nurse Practitioner Aylin Cheatham, MYA.P.R.N.]      Patient Comment: Esta medicina no me la yun podido enviar ya que mi neurologa no ha enviado sara la receta. Es urgente Porfabor. Fabian      Preferred pharmacy: [facility] - [place] [place] [facility] [id number]  Delivery method: Pickup      Pharmacy left message they cannot except script written by APRN. Must be written by M.D    Please advise  
- - -

## 2023-11-17 ENCOUNTER — OFFICE VISIT (OUTPATIENT)
Dept: NEUROLOGY | Facility: MEDICAL CENTER | Age: 38
End: 2023-11-17
Attending: PSYCHIATRY & NEUROLOGY

## 2023-11-17 ENCOUNTER — PHARMACY VISIT (OUTPATIENT)
Dept: PHARMACY | Facility: MEDICAL CENTER | Age: 38
End: 2023-11-17
Payer: COMMERCIAL

## 2023-11-17 VITALS
OXYGEN SATURATION: 98 % | HEIGHT: 63 IN | BODY MASS INDEX: 20.82 KG/M2 | DIASTOLIC BLOOD PRESSURE: 68 MMHG | WEIGHT: 117.5 LBS | TEMPERATURE: 97.8 F | HEART RATE: 64 BPM | SYSTOLIC BLOOD PRESSURE: 124 MMHG

## 2023-11-17 DIAGNOSIS — R56.9 SEIZURES (HCC): ICD-10-CM

## 2023-11-17 DIAGNOSIS — F39 MOOD DISORDER (HCC): ICD-10-CM

## 2023-11-17 DIAGNOSIS — G40.919 REFRACTORY EPILEPSY (HCC): ICD-10-CM

## 2023-11-17 PROCEDURE — 99212 OFFICE O/P EST SF 10 MIN: CPT | Performed by: PSYCHIATRY & NEUROLOGY

## 2023-11-17 PROCEDURE — 3078F DIAST BP <80 MM HG: CPT | Performed by: PSYCHIATRY & NEUROLOGY

## 2023-11-17 PROCEDURE — 3074F SYST BP LT 130 MM HG: CPT | Performed by: PSYCHIATRY & NEUROLOGY

## 2023-11-17 PROCEDURE — 99215 OFFICE O/P EST HI 40 MIN: CPT | Performed by: PSYCHIATRY & NEUROLOGY

## 2023-11-17 PROCEDURE — RXMED WILLOW AMBULATORY MEDICATION CHARGE: Performed by: PSYCHIATRY & NEUROLOGY

## 2023-11-17 ASSESSMENT — FIBROSIS 4 INDEX: FIB4 SCORE: 1.03

## 2023-11-17 NOTE — PROGRESS NOTES
NEUROLOGY FOLLOW UP VISIT NOTE 11/17/2023      Chief Complaint: Refractory Epilepsy       INTERVAL HISTORY FROM PREVIOUS CLINIC VISIT :    is a 38 year old, Japanese speaking right handed lady coming to neurology clinic for follow-up of her refractory epilepsy.  She was previously seen in clinic in 08/2023 and details of her complaints are documented in my initial consult note from 3/27/2023.  Prior to seeing me she was being followed in Neurology clinic by JORY Cheatham .The details of complaints were obtained from review of the previous clinic notes, available medical records and from discussing with the patient.  She was accompanied by a friend for today's clinic who has witnessed her episodes.   services was used for today's clinic visit.     Since the last clinic visit with me in 08/2023, she had 5 episodes in August 2023, 6 episodes in September, 3 in October and 3 in November 2023. She had to go to the hospital on 2 occasions to ER , as she hit her head for one and one she hurt her hand.  It was very difficult to get direct answers but from what I can understand, her episodes involve whole body shaking and loss of consciousness.  She also has other episodes the frequency of which is unclear mostly for staring into space for 1 to 2 minutes and back to baseline without any whole body shaking or falls .     Patient still does not have insurance and we are unable to get any EEG studies completed.  She was also referred to epilepsy monitoring unit admission but could not be completed.  Ambulatory EEG study again not covered as she does not have insurance.  Patient is somehow able to get her antiepileptic medications through her pharmacy.  She mentioned having high co-pay.  They are in the process of trying to apply for insurance again .  She mentioned having some difficulty with perampanel prescription .    One of her seizures in July 2023 she was  diagnosed with closed non displaced fracture of coronoid of right ulna and contusion of right shoulder.  She follows up with her primary care physician for her anemia and low RBC count.     Labs: Patient had labs at OSH in 10/2023: not available for review but per patient she was told all within normal limits      History of status epilepticus: Based on cEEG in , non convulsive status     Rescue medication: None      Epilepsy Risk Factors:  No known risk factors, including  insults, developmental delay, febrile seizures, CNS infections/strokes, head trauma, or family history of epilepsy     Age of seizure onset: at age 18     Previous AED's:carbamazepine, epitol, Lamotrigine (rash), Keppra, zonisamide (leukopenia)     Last seizure:2023     Current AED regimen: Briviact 200 mg BID, Lacosamide 200 mg BID, Fycompa 2 mg QHS       REVIEW OF HISTORY OF PRESENTING COMPLAINT:  In summary, her seizures started in , after delivery. Based on her previous notes, she was not prescribed any AED. They moved to [place] in , had another seizure while pregnant in .  She was on carbamazepine prior to Keppra.  Keppra was increased to 1000 mg twice daily in 2019 and had a couple of months without seizures. Lamotrigine added, but had rash with this. She was switched to zonisamide but this might have contributed to her low WBC.  Her EEG showed multifocal epileptic foci with bitemporal foci and seizures on EEG.  MRI showed left hippocampal sclerosis.  She continues to have breakthrough events on multiple medications and proven to be medication refractory. She does not have insurance and could not be hospitalized to the EMU to better localize her seizures and capture her ongoing events.  She continues to be on patient assistance program to get her medications.       She continues to follow up with a therapist.  She denied any suicidal or homicidal ideation.  She is on Lexapro 10 mg daily .  Patient denied  any focal weakness in upper or lower extremities, other than her right upper extremity pain issues as above.  No changes to her vision, diplopia, sensory level or falls other than associated with seizures.     Seizure types and semiology:   Type I:  Seizure type:Generalized  Warning/Aura: Feeling of something bad going to happen  Description: Loses focus and staring followed by loss of body tone and curled up in fetal position with body shaking  Loss of awareness: Yes  Convulsive: Yes  Post-ictal symptoms: Confused on waking up   Frequency:  3-4  /month  Duration: Shaking for up to 2 minutes and confused for 10-15 minutes   Triggers: None   Clusters of seizures: None      Type 2:  Seizure type: Focal  Warning/Aura: May or may not have weird feeling  Description: Staring   Loss of awareness: Yes  Convulsive: No   Post-ictal symptoms: None   Frequency:  1-2 episodes/week  Duration: less than 2 minutes   Triggers: None   Clusters of seizures: None      Previous Investigations: Based on available records  MRI Brain: 2020: Left hippocampal sclerosis (mesial temporal sclerosis) with no significant change from 2019 .No acute findings or significant interval change compared with 2019.     Continuous EE2020: Abnormal video EEG recording in the awake, drowsy, and sleep state(s).    Frequent, independent bitemporal sharps and intermittent bitemporal slowing noted. Initially, frequent, mostly brief runs of rhythmic or Lateralized Periodic Discharges (LPDs) on either right or left temporal regions, sometimes independently but may be synchronous. Frequent, brief non convulsive seizures with onset on either right or left temporal chains, with seizures spreading fast bitemporally, but typically becoming more prominent on the right temporal chain were captured. There were no clear clinical changes associated with the seizures, however the patient's mentation was not assessed during these otherwise not clinically  "detected spells. With further adjustments in anti-seizure medication, seizures have become rare and brief, with considerable improvement of the EEG. The findings suggest multifocal, refractory epilepsy with bitemporal epileptogenicity.     EMU : 2019: Active focal cortical irritability / dysfunction over left temporal --this patten is consistent with left temporal lobe seizure     EE2008: Abnormal EEG, revealing predominantly left temporal slowing with sharp wave activity and phase reversal, rare sharp wave activity with phase reversals noted on the right temporal head region and one episode of a generalized burst of poly-spike and slow wave activity.  No events recorded and no video abnormalities are recorded.      CURRENT MEDICATIONS AT THE TIME OF THIS ENCOUNTER:    Current Outpatient Medications:     brivaracetam, 200 mg, Oral, BID, Taking    lacosamide, 200 mg, Oral, BID, Taking    folic acid, 2 mg, Oral, QDAY, Taking    perampanel, 2 mg, Oral, QHS, Taking    Ferrous Sulfate (IRON PO), 1 Tablet, Oral, DAILY, Taking    escitalopram, 10 mg, Oral, DAILY, Taking    VITAMIN D PO, Take  by mouth., Taking     PAST MEDICAL HISTORY:  Past Medical History:   Diagnosis    Anemia    Depression with anxiety    Refractory epilepsy        PAST SURGICAL HISTORY:  No past surgical history on file.     FAMILY HISTORY:  Family History   Problem Relation Age of Onset    Hypertension Father         SOCIAL HISTORY:  Lives with 2 of her daughters     Social History     Tobacco Use    Smoking status: Never    Smokeless tobacco: Never   Vaping Use    Vaping Use: Never used   Substance Use Topics    Alcohol use: No    Drug use: No        Review of systems:      All other systems are reviewed and negative other than the ones mentioned in HPI.     EXAM:   Ambulatory Vitals:  /68 (BP Location: Right arm, Patient Position: Sitting, BP Cuff Size: Adult)   Pulse 64   Temp 36.6 °C (97.8 °F) (Temporal)   Ht 1.6 m (5' 3\")   " Wt 53.3 kg (117 lb 8.1 oz)   SpO2 98%        Physical Exam:   Neurological Exam:  Higher Mental Function: per   Awake, alert and oriented to place, person and time  Able to answer questions appropriately and follow commands well  Memory intact to immediate recall and remote events  Speech is clear and language fluent      Cranial Nerves:  CN II: Pupils equal and reactive, no visual field deficits on confrontation  CN III,IV, VI : EOM intact with no nystagmus  CN V: Facial sensation intact  CN VII: No facial asymmetry  CN VIII: Intact hearing to conversation  CN IX, X: palate elevates symmetrically   CN XI: Symmetric shoulder shrug  CN XII: tongue midline. No signs of tongue biting or fasciculations     Motor: 5/5  strength in bilateral upper and lower extremities. No tremor at rest or on outstretched hands. Tone normal and no fasciculations  Sensory: Intact to light touch, temperature, and vibration in all 4 extremities  Reflexes: 2+ and symmetric in all 4 extremities  Coordination: Intact to finger to nose in both upper extremities, no truncal or appendicular ataxia  Gait: Normal gait, without the use of any assistance. No difficulty getting up from the chair      General:   Patient in no acute distress, pleasant and cooperative.  HEENT: Normocephalic, no signs of acute trauma.   Neck: Supple. There is normal range of motion.         ASSESSMENT AND PLAN:  Refractory epilepsy:   is a 38 year-old right-handed lady with medication refractory temporal lobe epilepsy. Based on the available clinic notes she has been tried on multiple AED's and currently on a 3 AED regimen with at least 3-5 episodes per month of breakthrough seizures with loss of consciousness and other episodes of staring, the exact frequency of which is unclear.       Patient still does not have insurance and has not had a phase 1 evaluation for her left-sided MTS and medication refractory epilepsy. Previous EEG studies showed  bitemporal epileptic foci. EMU admission would be the next step for evaluation. She will continue on the current AED regimen and I wrote for 2 week samples picked up [facility]anel and try to get her medications the patient assistance program.  She is already on patient assistance program for the Briviact and Lacosamide.  I gave patient the forms to fill for perampanel patient assistance.     She denied any worsening behavioral issues or other concerns other than breakthrough episodes.  I talked about the risk for having ongoing episodes, increased frequency of seizures, and risk for conversion to status epilepticus and death, if seizures are not well controlled.  She will inform our clinic once she gets insurance to order EMU admission again.     - Continue AED at current doses: Perampanel 2mg QHS, Briviact 200 mg BID, Lacosamide 200 mg BID.      - Discussed side effects of medications and drug interactions       - Discussed avoidance of spell/sz triggers: alcohol, sleep deprivation, energy drinks, benadryl and stress.     - Discussed Vit D supplementation. Recommended taking 2000 IU daily.     - Patient is not driving. Discussed driving restrictions       -Labs completed in October 2023 during one of her ER visit per the patient.  I do not have access to these results but per patient her labs are all within normal limits.  Previous lab work from July 2023 showed anemia and I recommended close follow-up with her primary care physician for follow-up     - Risks of AED in reproductive age group: Contraceptive methods and folic acid 2 mg daily      2. Mood disorder:  She is on escitalopram 10 mg daily and continues to follow-up with a therapist. She denied any suicidal or homicidal ideation.  I recommended close follow-up with her therapist      She will follow up with my colleague Dr. Gallo in 3 to 4 months time to establish care, as I will be moving out of state. If she obtains insurance prior  to the next follow-up visit she will inform our clinic to arrange for epilepsy monitoring unit admission. She will continue to follow up closely with her primary care physician for monitoring of her anemia and abnormalities on CBC , and with her therapist for mood issues. If there are any breakthrough episodes, side effects or new symptoms, she will reach out to our clinic or seek immediate medical attention for any urgent matters.  I again reminded her to continue to keep an event diary and bring it with her at her next follow-up visit.      Total time of the visit was 52 minutes including time spent in precharting, review of the previous history and test results, documentation, discussing medication safety, side effects, reviewing plan of care with the help of the  and answering patient's questions .       EDUCATION, COUNSELING:     - Education was provided to the patient and/or family regarding diagnosis and prognosis. The chronic and unpredictable nature of the condition were discussed. There is increased risk for additional events, which may carry potential for significant injuries and death. Discussed frequent seizure triggers: sleep deprivation, medication non-compliance, use of illegal drugs/alcohol, stress, and others.      - Reviewed in detail the current antiepileptic regimen. Potential side effects of antiepileptics were discussed at length, including but no limited to: hypersensitivity reactions (rash and others, some of which can be fatal), visual field changes (some of which may be irreversible), glaucoma, diplopia, kidney stones, osteopenia/osteoporosis/ bone fractures, hyperthermia/anhydrosis, hyponatremia, tremors/abnormal movements, ataxia, dizziness, fatigue, increased risk for falls, risk for cardiac arrhythmias and syncope, weight changes, hair loss, gastrointestinal side effects(hepatitis, pancreatitis, gastritis, ulcers, gingival hypertrophy/bleeding, drowsiness, sedation, memory  loss, trouble finding words, anxiety/nervousness, increased risk for suicide, increased risk for depression, and psychosis.      - Reviewed drug-drug interactions and their potential effect on seizure control and medication side effects.       -Recommend chronic vitamin D supplementation and regular exercise (if not contraindicated).      -Patient/family educated on risk for SUDEP (Sudden Death in Epilepsy). Counseling was provided on the importance of strict medication and follow up compliance. The patient/family understand the risks associated with non-adherence with the medical plan as outlined, including but not limited to an increased risk for breakthrough seizures, which may contribute to injuries, disability, status epilepticus, and even death.      -Counseling was also provided on potential effects of alcohol and other drugs, which may lower seizure threshold and/or affect the metabolism of antiepileptic drugs. We recommend avoidance of alcohol and illegal drugs and avoid sleep deprivation.      - Patient does not drive     -Other seizure precautions were discussed at length, including no diving, no skydiving, no climbing or exposure to unprotected heights, no unsupervised swimming, no Jacuzzi or bathing in bathtubs or deep bodies of water. The patient/family have been advised about risks for operating any machinery while suffering from seizures / syncope / epilepsy and/or while taking antiepileptic drugs.      -The patient understands and agrees that due to the complexity of his/her diagnosis, results of any testing and further recommendations will typically be discussed/made during a face to face encounter in office. The patient and/or family further understands it is their responsibility to keep proper follow up.      Patient/family agree with plan, as outlined.      Bony Sommers MD, MHS  Department of Neurology at [facility]

## 2024-01-23 DIAGNOSIS — R56.9 SEIZURES (HCC): ICD-10-CM

## 2024-01-23 DIAGNOSIS — G40.919 REFRACTORY EPILEPSY (HCC): ICD-10-CM

## 2024-01-24 NOTE — TELEPHONE ENCOUNTER
Caller: Jennifer    Medication Name and Dosage:   perampanel (FYCOMPA) 2 MG Tab tablet (Order #[id number]) on 3/26/20     Medication amount left: 0    [facility]:   [facility]    Other questions (Topic): N/A    Callback Number (Will only call for issues): [phone]    Thank you,   Cheryl CASEY

## 2024-01-25 NOTE — TELEPHONE ENCOUNTER
Received request via: Pharmacy    Medication Name/Dosage perampanel (FYCOMPA) 2 MG Tab tablet     When was medication last prescribed 08/08/23    How many refills were previously provided 2    How many Refills does he patient have left from last prescription 0    Was the patient seen in the last year in this department? Yes   Date of last office visit 11/17/23     Per last Neurology Office Visit, when was the date of next follow up visit set for?                            Date of office visit follow up request 3 months      Does the patient have an upcoming appointment? Yes   If yes, when 02/26/24             If no, schedule appointment N/A     Does the patient have senior living Plus and need 100 day supply (blood pressure, diabetes and cholesterol meds only)? Patient does not have SCP

## 2024-01-29 ENCOUNTER — TELEPHONE (OUTPATIENT)
Dept: NEUROLOGY | Facility: MEDICAL CENTER | Age: 39
End: 2024-01-29

## 2024-01-29 NOTE — TELEPHONE ENCOUNTER
VOICEMAIL  1. Caller Name: Kaleigh                      Call Back Number: [phone]    2. Message: Kaleigh called because patient needed her Fycompa filled as she was out of medication.     3. Patient approves office to leave a detailed voicemail/MyChart message: N\A    AFTER DOING SOME RESEARCH DR GARCIA FILLED THIS PRESCRIPTION ON 01/24/24 BUT IT HAD NOT BEEN RELEASED FROM THE [facility] SO I MESSAGED HALLE TSANG FROM THE PHARMACY [facility] AND SHE RELEASED IT FROM THE [facility]. PATIENT WILL BE GETTING HER MEDICATION TODAY.

## 2024-02-26 ENCOUNTER — APPOINTMENT (OUTPATIENT)
Dept: NEUROLOGY | Facility: MEDICAL CENTER | Age: 39
End: 2024-02-26
Attending: STUDENT IN AN ORGANIZED HEALTH CARE EDUCATION/TRAINING PROGRAM

## 2024-03-03 ENCOUNTER — PATIENT MESSAGE (OUTPATIENT)
Dept: NEUROLOGY | Facility: MEDICAL CENTER | Age: 39
End: 2024-03-03

## 2024-03-26 ENCOUNTER — TELEPHONE (OUTPATIENT)
Dept: NEUROLOGY | Facility: MEDICAL CENTER | Age: 39
End: 2024-03-26

## 2024-03-26 NOTE — TELEPHONE ENCOUNTER
03/26/24  I contacted patient through [employer] and was told by patient that she has already contacted billing to find out what her estimate will be for next visit. She also verified that she had scheduled an appointment with Dr Marin in May. HL

## 2024-04-25 ENCOUNTER — TELEPHONE (OUTPATIENT)
Dept: NEUROLOGY | Facility: MEDICAL CENTER | Age: 39
End: 2024-04-25

## 2024-04-25 NOTE — TELEPHONE ENCOUNTER
04/25/24  Darryl from [facility] called and stated that the patient reached out to them and stated she needs 2 of her medications E-scribed to them. Her Briviact and her Vimpat both need to go to that Pharmacy. Can you please send these to the pharmacy for the patient. She has an appointment with you on 05/17/24. Thanks

## 2024-05-01 DIAGNOSIS — G40.919 REFRACTORY EPILEPSY (HCC): ICD-10-CM

## 2024-05-01 DIAGNOSIS — R56.9 SEIZURES (HCC): ICD-10-CM

## 2024-05-01 NOTE — TELEPHONE ENCOUNTER
Received request via: Patient    Medication Name/Dosage brivaracetam (BRIVIACT) 100 MG Tab tablet     lacosamide (VIMPAT) 200 MG Tab tablet       When was medication last prescribed 08/15/2023, 08/15/2023    How many refills were previously provided 3, 3    How many Refills does he patient have left from last prescription 0    Was the patient seen in the last year in this department? Yes   Date of last office visit 11/17/2023     Per last Neurology Office Visit, when was the date of next follow up visit set for?                            Date of office visit follow up request 3 months     Does the patient have an upcoming appointment? Yes   If yes, when 5/17/2024             If no, schedule appointment N/A    Does the patient have FPC Plus and need 100 day supply (blood pressure, diabetes and cholesterol meds only)? Patient does not have SCP

## 2024-05-02 DIAGNOSIS — G40.919 REFRACTORY EPILEPSY (HCC): ICD-10-CM

## 2024-05-02 DIAGNOSIS — R56.9 SEIZURES (HCC): ICD-10-CM

## 2024-05-03 ENCOUNTER — TELEPHONE (OUTPATIENT)
Dept: NEUROLOGY | Facility: MEDICAL CENTER | Age: 39
End: 2024-05-03

## 2024-05-03 DIAGNOSIS — G40.919 REFRACTORY EPILEPSY (HCC): ICD-10-CM

## 2024-05-03 DIAGNOSIS — R56.9 SEIZURES (HCC): ICD-10-CM

## 2024-05-03 RX ORDER — LACOSAMIDE 200 MG/1
200 TABLET ORAL 2 TIMES DAILY
Qty: 180 TABLET | Refills: 1 | Status: SHIPPED | OUTPATIENT
Start: 2024-05-03 | End: 2024-05-03 | Stop reason: SDUPTHER

## 2024-05-03 RX ORDER — LACOSAMIDE 200 MG/1
200 TABLET ORAL 2 TIMES DAILY
Qty: 180 TABLET | Refills: 0 | Status: CANCELLED | OUTPATIENT
Start: 2024-05-03 | End: 2024-10-30

## 2024-05-03 RX ORDER — LACOSAMIDE 200 MG/1
200 TABLET ORAL 2 TIMES DAILY
Qty: 180 TABLET | Refills: 1 | Status: SHIPPED | OUTPATIENT
Start: 2024-05-03 | End: 2024-10-30

## 2024-05-03 NOTE — TELEPHONE ENCOUNTER
Received request via: Pharmacy    Medication Name/Dosage Fycompa 2MG    When was medication last prescribed 01/24/24    How many refills were previously provided 1    How many Refills does he patient have left from last prescription 0    Was the patient seen in the last year in this department? Yes   Date of last office visit 11/17/23     Per last Neurology Office Visit, when was the date of next follow up visit set for?                            Date of office visit follow up request 3 months     Does the patient have an upcoming appointment? Yes   If yes, when 05/17/24             If no, schedule appointment N/A    Does the patient have custodial Plus and need 100 day supply (blood pressure, diabetes and cholesterol meds only)? Medication is not for cholesterol, blood pressure or diabetes

## 2024-05-03 NOTE — TELEPHONE ENCOUNTER
I called and spoke to the patient friend Vani (english conversation) to let the patient know that medications was refilled by Dr. Marin today and if they can give us a call to confirm that they have received/picked up the prescriptions. Meera Yuen MA.

## 2024-05-03 NOTE — TELEPHONE ENCOUNTER
PHARMACY NEVER RECEIVED PRESCRIPTION      Received request via: Patient    Medication Name/Dosage Briviact 100MG, Lacosamide 200MG    When was medication last prescribed 05/03/24 BOTH    How many refills were previously provided 1 BOTH    How many Refills does he patient have left from last prescription 1 BOTH    Was the patient seen in the last year in this department? Yes   Date of last office visit 11/01/23     Per last Neurology Office Visit, when was the date of next follow up visit set for?                            Date of office visit follow up request 3 months     Does the patient have an upcoming appointment? Yes   If yes, when 05/17/24             If no, schedule appointment N/A    Does the patient have long term Plus and need 100 day supply (blood pressure, diabetes and cholesterol meds only)? Medication is not for cholesterol, blood pressure or diabetes

## 2024-05-06 ENCOUNTER — TELEPHONE (OUTPATIENT)
Dept: NEUROLOGY | Facility: MEDICAL CENTER | Age: 39
End: 2024-05-06

## 2024-05-06 NOTE — TELEPHONE ENCOUNTER
05/06/24  Spoke with pharmacy on the phone and was able to confirm that the prescription for Vimpat and Briviact were set to be delivered on Wednesday the 8th of May. I sent a message to the patient using [employer]. HL

## 2024-05-17 ENCOUNTER — APPOINTMENT (OUTPATIENT)
Dept: NEUROLOGY | Facility: MEDICAL CENTER | Age: 39
End: 2024-05-17
Attending: STUDENT IN AN ORGANIZED HEALTH CARE EDUCATION/TRAINING PROGRAM

## 2024-05-21 ENCOUNTER — TELEPHONE (OUTPATIENT)
Dept: NEUROLOGY | Facility: MEDICAL CENTER | Age: 39
End: 2024-05-21

## 2024-05-21 NOTE — TELEPHONE ENCOUNTER
Patient needs medication refill as soon as possible for Briviact and Vimpat . Give her a call as soon as possible with an update please. Her pharmacy did give her medication for three days and then she will be out.

## 2024-05-29 NOTE — TELEPHONE ENCOUNTER
Called patient through . Message left requested  refills sent to [facility]'s pharmacy in [place]. Patient needs to schedule appointment before any further refills. Patient has not established with a new provider since Dr Sommers  left.

## 2024-06-10 ENCOUNTER — HOSPITAL ENCOUNTER (OUTPATIENT)
Dept: LAB | Facility: MEDICAL CENTER | Age: 39
End: 2024-06-10
Attending: REGISTERED NURSE
Payer: COMMERCIAL

## 2024-06-10 LAB
ALBUMIN SERPL BCP-MCNC: 4.4 G/DL (ref 3.2–4.9)
ALBUMIN/GLOB SERPL: 2 G/DL
ALP SERPL-CCNC: 67 U/L (ref 30–99)
ALT SERPL-CCNC: 9 U/L (ref 2–50)
ANION GAP SERPL CALC-SCNC: 15 MMOL/L (ref 7–16)
AST SERPL-CCNC: 19 U/L (ref 12–45)
BASOPHILS # BLD AUTO: 0.3 % (ref 0–1.8)
BASOPHILS # BLD: 0.01 K/UL (ref 0–0.12)
BILIRUB SERPL-MCNC: 0.4 MG/DL (ref 0.1–1.5)
BUN SERPL-MCNC: 17 MG/DL (ref 8–22)
CALCIUM ALBUM COR SERPL-MCNC: 8.4 MG/DL (ref 8.5–10.5)
CALCIUM SERPL-MCNC: 8.7 MG/DL (ref 8.5–10.5)
CHLORIDE SERPL-SCNC: 106 MMOL/L (ref 96–112)
CHOLEST SERPL-MCNC: 186 MG/DL (ref 100–199)
CO2 SERPL-SCNC: 22 MMOL/L (ref 20–33)
CREAT SERPL-MCNC: 0.73 MG/DL (ref 0.5–1.4)
EOSINOPHIL # BLD AUTO: 0 K/UL (ref 0–0.51)
EOSINOPHIL NFR BLD: 0 % (ref 0–6.9)
ERYTHROCYTE [DISTWIDTH] IN BLOOD BY AUTOMATED COUNT: 43.9 FL (ref 35.9–50)
FASTING STATUS PATIENT QL REPORTED: NORMAL
GFR SERPLBLD CREATININE-BSD FMLA CKD-EPI: 107 ML/MIN/1.73 M 2
GLOBULIN SER CALC-MCNC: 2.2 G/DL (ref 1.9–3.5)
GLUCOSE SERPL-MCNC: 91 MG/DL (ref 65–99)
HCT VFR BLD AUTO: 40.7 % (ref 37–47)
HDLC SERPL-MCNC: 75 MG/DL
HGB BLD-MCNC: 13.9 G/DL (ref 12–16)
IMM GRANULOCYTES # BLD AUTO: 0.01 K/UL (ref 0–0.11)
IMM GRANULOCYTES NFR BLD AUTO: 0.3 % (ref 0–0.9)
LDLC SERPL CALC-MCNC: 96 MG/DL
LYMPHOCYTES # BLD AUTO: 1.02 K/UL (ref 1–4.8)
LYMPHOCYTES NFR BLD: 32.5 % (ref 22–41)
MAGNESIUM SERPL-MCNC: 2.4 MG/DL (ref 1.5–2.5)
MCH RBC QN AUTO: 31.5 PG (ref 27–33)
MCHC RBC AUTO-ENTMCNC: 34.2 G/DL (ref 32.2–35.5)
MCV RBC AUTO: 92.3 FL (ref 81.4–97.8)
MONOCYTES # BLD AUTO: 0.2 K/UL (ref 0–0.85)
MONOCYTES NFR BLD AUTO: 6.4 % (ref 0–13.4)
NEUTROPHILS # BLD AUTO: 1.9 K/UL (ref 1.82–7.42)
NEUTROPHILS NFR BLD: 60.5 % (ref 44–72)
NRBC # BLD AUTO: 0 K/UL
NRBC BLD-RTO: 0 /100 WBC (ref 0–0.2)
PLATELET # BLD AUTO: 142 K/UL (ref 164–446)
PMV BLD AUTO: 10.6 FL (ref 9–12.9)
POTASSIUM SERPL-SCNC: 4.4 MMOL/L (ref 3.6–5.5)
PROT SERPL-MCNC: 6.6 G/DL (ref 6–8.2)
RBC # BLD AUTO: 4.41 M/UL (ref 4.2–5.4)
SODIUM SERPL-SCNC: 143 MMOL/L (ref 135–145)
TRIGL SERPL-MCNC: 73 MG/DL (ref 0–149)
WBC # BLD AUTO: 3.1 K/UL (ref 4.8–10.8)

## 2024-06-10 PROCEDURE — 36415 COLL VENOUS BLD VENIPUNCTURE: CPT

## 2024-06-10 PROCEDURE — 83735 ASSAY OF MAGNESIUM: CPT

## 2024-06-10 PROCEDURE — 85025 COMPLETE CBC W/AUTO DIFF WBC: CPT

## 2024-06-10 PROCEDURE — 82306 VITAMIN D 25 HYDROXY: CPT

## 2024-06-10 PROCEDURE — 80053 COMPREHEN METABOLIC PANEL: CPT

## 2024-06-10 PROCEDURE — 82746 ASSAY OF FOLIC ACID SERUM: CPT

## 2024-06-10 PROCEDURE — 84425 ASSAY OF VITAMIN B-1: CPT

## 2024-06-10 PROCEDURE — 80061 LIPID PANEL: CPT

## 2024-06-11 LAB
25(OH)D3 SERPL-MCNC: 48 NG/ML (ref 30–100)
FOLATE SERPL-MCNC: 25.6 NG/ML

## 2024-06-15 LAB — VIT B1 BLD-MCNC: 125 NMOL/L (ref 70–180)

## 2024-08-24 ENCOUNTER — HOSPITAL ENCOUNTER (OUTPATIENT)
Dept: LAB | Facility: MEDICAL CENTER | Age: 39
End: 2024-08-24
Attending: NURSE PRACTITIONER
Payer: COMMERCIAL

## 2024-08-24 LAB
EST. AVERAGE GLUCOSE BLD GHB EST-MCNC: 94 MG/DL
HAV IGM SERPL QL IA: NORMAL
HBA1C MFR BLD: 4.9 % (ref 4–5.6)
HBV CORE IGM SER QL: NORMAL
HBV SURFACE AG SER QL: NORMAL
HCV AB SER QL: NORMAL
INR PPP: 1.01 (ref 0.87–1.13)
IRON SATN MFR SERPL: 51 % (ref 15–55)
IRON SERPL-MCNC: 151 UG/DL (ref 40–170)
PROTHROMBIN TIME: 13.5 SEC (ref 12–14.6)
TIBC SERPL-MCNC: 295 UG/DL (ref 250–450)
UIBC SERPL-MCNC: 144 UG/DL (ref 110–370)

## 2024-08-24 PROCEDURE — 86038 ANTINUCLEAR ANTIBODIES: CPT

## 2024-08-24 PROCEDURE — 83036 HEMOGLOBIN GLYCOSYLATED A1C: CPT

## 2024-08-24 PROCEDURE — 86039 ANTINUCLEAR ANTIBODIES (ANA): CPT

## 2024-08-24 PROCEDURE — 85610 PROTHROMBIN TIME: CPT

## 2024-08-24 PROCEDURE — 83540 ASSAY OF IRON: CPT

## 2024-08-24 PROCEDURE — 80074 ACUTE HEPATITIS PANEL: CPT

## 2024-08-24 PROCEDURE — 83550 IRON BINDING TEST: CPT

## 2024-08-24 PROCEDURE — 36415 COLL VENOUS BLD VENIPUNCTURE: CPT

## 2024-08-26 LAB — NUCLEAR IGG SER QL IA: DETECTED

## 2024-08-27 LAB
ANA PAT SER IF-IMP: ABNORMAL
ANA PAT SER IF-IMP: ABNORMAL
NUCLEAR IGG SER QL IF: DETECTED
NUCLEAR IGG TITR SER IF: ABNORMAL {TITER}

## 2024-10-08 DIAGNOSIS — G40.919 REFRACTORY EPILEPSY (HCC): ICD-10-CM

## 2024-10-08 DIAGNOSIS — R56.9 SEIZURES (HCC): ICD-10-CM

## 2024-10-08 RX ORDER — LACOSAMIDE 200 MG/1
200 TABLET, FILM COATED ORAL 2 TIMES DAILY
Qty: 180 TABLET | Refills: 0 | OUTPATIENT
Start: 2024-10-08

## 2024-10-08 RX ORDER — BRIVARACETAM 100 MG/1
TABLET, FILM COATED ORAL
Qty: 360 TABLET | Refills: 0 | OUTPATIENT
Start: 2024-10-08

## 2024-10-10 ENCOUNTER — TELEPHONE (OUTPATIENT)
Dept: NEUROLOGY | Facility: MEDICAL CENTER | Age: 39
End: 2024-10-10
Payer: COMMERCIAL

## 2024-10-12 RX ORDER — LACOSAMIDE 200 MG/1
200 TABLET ORAL 2 TIMES DAILY
Qty: 180 TABLET | Refills: 1 | Status: SHIPPED | OUTPATIENT
Start: 2024-10-12 | End: 2025-04-10

## 2024-10-14 ENCOUNTER — TELEPHONE (OUTPATIENT)
Dept: NEUROLOGY | Facility: MEDICAL CENTER | Age: 39
End: 2024-10-14
Payer: COMMERCIAL

## 2025-02-05 ENCOUNTER — TELEPHONE (OUTPATIENT)
Dept: NEUROLOGY | Facility: MEDICAL CENTER | Age: 40
End: 2025-02-05
Payer: COMMERCIAL

## 2025-02-05 NOTE — TELEPHONE ENCOUNTER
Patient called she said that she said that she spoke with her pharmacy and her medication for Briviact was sent to them incorrectly the amount she takes is 100 mg in the morning and at night it looks like the amount is not correct so they won't sent it to her unless the order is correct she said she ra out yesterday and needs this fixed as soon as possible.

## 2025-02-05 NOTE — TELEPHONE ENCOUNTER
Patient is seeing Neurologist in [place]. Patient was informed that in order to receive any refills from  she needs to be seen in clinic.